# Patient Record
Sex: FEMALE | Race: WHITE | NOT HISPANIC OR LATINO | URBAN - METROPOLITAN AREA
[De-identification: names, ages, dates, MRNs, and addresses within clinical notes are randomized per-mention and may not be internally consistent; named-entity substitution may affect disease eponyms.]

---

## 2017-09-19 ENCOUNTER — OFFICE (OUTPATIENT)
Dept: URBAN - METROPOLITAN AREA CLINIC 75 | Facility: CLINIC | Age: 62
End: 2017-09-19

## 2017-09-19 VITALS
SYSTOLIC BLOOD PRESSURE: 132 MMHG | DIASTOLIC BLOOD PRESSURE: 64 MMHG | HEART RATE: 85 BPM | HEIGHT: 64 IN | WEIGHT: 167 LBS

## 2017-09-19 DIAGNOSIS — R10.10 UPPER ABDOMINAL PAIN, UNSPECIFIED: ICD-10-CM

## 2017-09-19 DIAGNOSIS — K21.9 GASTRO-ESOPHAGEAL REFLUX DISEASE WITHOUT ESOPHAGITIS: ICD-10-CM

## 2017-09-19 DIAGNOSIS — K57.32 DIVERTICULITIS OF LARGE INTESTINE WITHOUT PERFORATION OR ABS: ICD-10-CM

## 2017-09-19 DIAGNOSIS — R93.3 ABNORMAL FINDINGS ON DIAGNOSTIC IMAGING OF OTHER PARTS OF DI: ICD-10-CM

## 2017-09-19 PROCEDURE — 99204 OFFICE O/P NEW MOD 45 MIN: CPT

## 2018-12-30 ENCOUNTER — HOSPITAL ENCOUNTER (EMERGENCY)
Facility: HOSPITAL | Age: 63
Discharge: HOME | End: 2018-12-30
Attending: EMERGENCY MEDICINE | Admitting: EMERGENCY MEDICINE

## 2018-12-30 ENCOUNTER — APPOINTMENT (OUTPATIENT)
Dept: GENERAL RADIOLOGY | Facility: HOSPITAL | Age: 63
End: 2018-12-30

## 2018-12-30 VITALS
DIASTOLIC BLOOD PRESSURE: 72 MMHG | HEIGHT: 64 IN | RESPIRATION RATE: 18 BRPM | TEMPERATURE: 98.7 F | BODY MASS INDEX: 28.68 KG/M2 | HEART RATE: 70 BPM | WEIGHT: 168 LBS | OXYGEN SATURATION: 93 % | SYSTOLIC BLOOD PRESSURE: 155 MMHG

## 2018-12-30 DIAGNOSIS — J06.9 UPPER RESPIRATORY TRACT INFECTION, UNSPECIFIED TYPE: Primary | ICD-10-CM

## 2018-12-30 LAB
ALBUMIN SERPL-MCNC: 4.7 G/DL (ref 3.5–5.2)
ALBUMIN/GLOB SERPL: 1.5 G/DL
ALP SERPL-CCNC: 76 U/L (ref 40–129)
ALT SERPL W P-5'-P-CCNC: 17 U/L (ref 5–33)
ANION GAP SERPL CALCULATED.3IONS-SCNC: 12.6 MMOL/L
AST SERPL-CCNC: 16 U/L (ref 5–32)
BASOPHILS # BLD AUTO: 0.01 10*3/MM3 (ref 0–0.2)
BASOPHILS NFR BLD AUTO: 0.1 % (ref 0–2)
BILIRUB SERPL-MCNC: 0.6 MG/DL (ref 0.2–1.2)
BUN BLD-MCNC: 18 MG/DL (ref 8–23)
BUN/CREAT SERPL: 24.7 (ref 7–25)
CALCIUM SPEC-SCNC: 9.9 MG/DL (ref 8.8–10.5)
CHLORIDE SERPL-SCNC: 100 MMOL/L (ref 98–107)
CO2 SERPL-SCNC: 26.4 MMOL/L (ref 22–29)
CREAT BLD-MCNC: 0.73 MG/DL (ref 0.57–1)
DEPRECATED RDW RBC AUTO: 40.3 FL (ref 37–54)
EOSINOPHIL # BLD AUTO: 0.1 10*3/MM3 (ref 0.1–0.3)
EOSINOPHIL NFR BLD AUTO: 1.1 % (ref 0–4)
ERYTHROCYTE [DISTWIDTH] IN BLOOD BY AUTOMATED COUNT: 12.7 % (ref 11.5–14.5)
FLUAV AG NPH QL: NEGATIVE
FLUBV AG NPH QL IA: NEGATIVE
GFR SERPL CREATININE-BSD FRML MDRD: 81 ML/MIN/1.73
GLOBULIN UR ELPH-MCNC: 3.1 GM/DL
GLUCOSE BLD-MCNC: 94 MG/DL (ref 65–99)
HCT VFR BLD AUTO: 42.3 % (ref 37–47)
HGB BLD-MCNC: 14.2 G/DL (ref 12–16)
IMM GRANULOCYTES # BLD AUTO: 0.02 10*3/MM3 (ref 0–0.03)
IMM GRANULOCYTES NFR BLD AUTO: 0.2 % (ref 0–0.5)
LYMPHOCYTES # BLD AUTO: 1.53 10*3/MM3 (ref 0.6–4.8)
LYMPHOCYTES NFR BLD AUTO: 17.4 % (ref 20–45)
MCH RBC QN AUTO: 29.1 PG (ref 27–31)
MCHC RBC AUTO-ENTMCNC: 33.6 G/DL (ref 31–37)
MCV RBC AUTO: 86.7 FL (ref 81–99)
MONOCYTES # BLD AUTO: 0.46 10*3/MM3 (ref 0–1)
MONOCYTES NFR BLD AUTO: 5.2 % (ref 3–8)
NEUTROPHILS # BLD AUTO: 6.65 10*3/MM3 (ref 1.5–8.3)
NEUTROPHILS NFR BLD AUTO: 76 % (ref 45–70)
NRBC BLD AUTO-RTO: 0 /100 WBC (ref 0–0)
PLAT MORPH BLD: NORMAL
PLATELET # BLD AUTO: 370 10*3/MM3 (ref 140–500)
PMV BLD AUTO: 8.9 FL (ref 7.4–10.4)
POTASSIUM BLD-SCNC: 4.9 MMOL/L (ref 3.5–5.2)
PROT SERPL-MCNC: 7.8 G/DL (ref 6–8.5)
RBC # BLD AUTO: 4.88 10*6/MM3 (ref 4.2–5.4)
RBC MORPH BLD: NORMAL
SODIUM BLD-SCNC: 139 MMOL/L (ref 136–145)
WBC MORPH BLD: NORMAL
WBC NRBC COR # BLD: 8.77 10*3/MM3 (ref 4.8–10.8)

## 2018-12-30 PROCEDURE — 80053 COMPREHEN METABOLIC PANEL: CPT | Performed by: EMERGENCY MEDICINE

## 2018-12-30 PROCEDURE — 99284 EMERGENCY DEPT VISIT MOD MDM: CPT

## 2018-12-30 PROCEDURE — 85025 COMPLETE CBC W/AUTO DIFF WBC: CPT | Performed by: EMERGENCY MEDICINE

## 2018-12-30 PROCEDURE — 85007 BL SMEAR W/DIFF WBC COUNT: CPT | Performed by: EMERGENCY MEDICINE

## 2018-12-30 PROCEDURE — 99282 EMERGENCY DEPT VISIT SF MDM: CPT | Performed by: EMERGENCY MEDICINE

## 2018-12-30 PROCEDURE — 87804 INFLUENZA ASSAY W/OPTIC: CPT | Performed by: EMERGENCY MEDICINE

## 2018-12-30 PROCEDURE — 71046 X-RAY EXAM CHEST 2 VIEWS: CPT

## 2018-12-30 RX ORDER — BENZONATATE 100 MG/1
100 CAPSULE ORAL 3 TIMES DAILY PRN
COMMUNITY
End: 2022-12-02

## 2018-12-30 RX ORDER — DEXTROMETHORPHAN HYDROBROMIDE AND PROMETHAZINE HYDROCHLORIDE 15; 6.25 MG/5ML; MG/5ML
5 SYRUP ORAL 4 TIMES DAILY PRN
Qty: 120 ML | Refills: 0 | Status: SHIPPED | OUTPATIENT
Start: 2018-12-30 | End: 2022-12-02

## 2018-12-30 RX ORDER — METHYLPREDNISOLONE 4 MG/1
TABLET ORAL
Qty: 21 TABLET | Refills: 0 | Status: SHIPPED | OUTPATIENT
Start: 2018-12-30 | End: 2022-12-02

## 2018-12-30 RX ORDER — AMOXICILLIN AND CLAVULANATE POTASSIUM 875; 125 MG/1; MG/1
1 TABLET, FILM COATED ORAL 2 TIMES DAILY
COMMUNITY
End: 2018-12-30

## 2018-12-30 RX ORDER — BENAZEPRIL HYDROCHLORIDE 20 MG/1
20 TABLET ORAL DAILY
COMMUNITY
End: 2022-12-02

## 2022-12-02 ENCOUNTER — OFFICE VISIT (OUTPATIENT)
Dept: FAMILY MEDICINE CLINIC | Facility: CLINIC | Age: 67
End: 2022-12-02

## 2022-12-02 ENCOUNTER — PATIENT ROUNDING (BHMG ONLY) (OUTPATIENT)
Dept: FAMILY MEDICINE CLINIC | Facility: CLINIC | Age: 67
End: 2022-12-02

## 2022-12-02 VITALS
SYSTOLIC BLOOD PRESSURE: 130 MMHG | WEIGHT: 157 LBS | HEART RATE: 92 BPM | TEMPERATURE: 98.4 F | DIASTOLIC BLOOD PRESSURE: 68 MMHG | HEIGHT: 64 IN | OXYGEN SATURATION: 98 % | BODY MASS INDEX: 26.8 KG/M2

## 2022-12-02 DIAGNOSIS — K21.9 GASTROESOPHAGEAL REFLUX DISEASE WITHOUT ESOPHAGITIS: ICD-10-CM

## 2022-12-02 DIAGNOSIS — I10 PRIMARY HYPERTENSION: Primary | ICD-10-CM

## 2022-12-02 DIAGNOSIS — Z87.440 HISTORY OF RECURRENT UTI (URINARY TRACT INFECTION): ICD-10-CM

## 2022-12-02 PROCEDURE — 99204 OFFICE O/P NEW MOD 45 MIN: CPT | Performed by: FAMILY MEDICINE

## 2022-12-02 RX ORDER — TRAVOPROST OPHTHALMIC SOLUTION 0.04 MG/ML
0 SOLUTION OPHTHALMIC DAILY
COMMUNITY
Start: 2022-10-07

## 2022-12-02 RX ORDER — AMLODIPINE BESYLATE 5 MG/1
5 TABLET ORAL DAILY
COMMUNITY
Start: 2022-09-26 | End: 2023-03-28 | Stop reason: SDUPTHER

## 2022-12-02 RX ORDER — BENAZEPRIL HYDROCHLORIDE 40 MG/1
40 TABLET, FILM COATED ORAL DAILY
COMMUNITY
End: 2023-03-28 | Stop reason: SDUPTHER

## 2022-12-02 RX ORDER — HYOSCYAMINE SULFATE 0.125 MG
0.12 TABLET ORAL CONTINUOUS PRN
COMMUNITY

## 2022-12-02 RX ORDER — ESOMEPRAZOLE MAGNESIUM 40 MG/1
40 CAPSULE, DELAYED RELEASE ORAL DAILY
COMMUNITY
Start: 2022-08-02

## 2022-12-02 RX ORDER — TRIMETHOPRIM 100 MG/1
100 TABLET ORAL DAILY
COMMUNITY
Start: 2022-10-10 | End: 2023-01-06 | Stop reason: SDUPTHER

## 2022-12-02 RX ORDER — BENAZEPRIL HYDROCHLORIDE 20 MG/1
40 TABLET ORAL DAILY
COMMUNITY
End: 2022-12-02

## 2022-12-02 NOTE — PROGRESS NOTES
Sent Patient following in [employer] Message:  My name is Sherri Edwards      I am the Practice Manager with   [facility] [address]  [facility] [address] [phone].      I am messaging to officially welcome you to our practice and ask about your recent visit.     Tell me about your visit with us. What things went well?         We're always looking for ways to make our patients' experiences even better. Do you have recommendations on ways we may improve?       Overall were you satisfied with your first visit to our practice?        Is there anything else I can do for you?       Thank you, and have a great day.

## 2022-12-02 NOTE — PROGRESS NOTES
"Chief Complaint  Establish Care (Need a family doctor hers is retiring )    Subjective        Jeanine Guzman presents to [facility]  History of Present Illness  The patient presents today to establish care. She was previously seen by Dr. Boo.  She is establishing care for the following chronic health conditions:    Hypertension.  diagnosed in 2015 or 2016. She is currently taking amlodipine 5 mg daily and benazepril 40 mg daily. She denies any side effects from the medications. Her blood pressure is well controlled today. She does not monitor her blood pressure at home.    Gastroesophageal reflux disease. Began in 2017. She has tried over-the-counter medications in the past, which did not provide relief. She is currently taking Nexium 40 mg once daily, which provides relief. She has changed her diet to avoid fried foods and soda. She is avoiding meat. She is eating a plant-based diet. She had an endoscopy approximately 2 years ago, which was normal.    Chronic recurrent UTIs. She is currently taking trimethoprim for prevention. She was referred to Dr. Morales , but had to cancel the appointment due to other health issues. She started having more frequent UTIs in 2014.     The patient states she do no want to take a pill for her high cholesterol.      Objective   Vital Signs:  /68   Pulse 92   Temp 98.4 °F (36.9 °C)   Ht 162.6 cm (64\")   Wt 71.2 kg (157 lb)   SpO2 98%   BMI 26.95 kg/m²   Estimated body mass index is 26.95 kg/m² as calculated from the following:    Height as of this encounter: 162.6 cm (64\").    Weight as of this encounter: 71.2 kg (157 lb).          Physical Exam  Vitals and nursing note reviewed.   Constitutional:       Appearance: She is well-developed and normal weight.   HENT:      Head: Normocephalic and atraumatic.      Right Ear: External ear normal.      Left Ear: External ear normal.      Nose: Nose normal.   Eyes:      General: No scleral " icterus.     Conjunctiva/sclera: Conjunctivae normal.   Cardiovascular:      Rate and Rhythm: Normal rate and regular rhythm.      Heart sounds: Normal heart sounds.   Pulmonary:      Effort: Pulmonary effort is normal.      Breath sounds: Normal breath sounds.   Musculoskeletal:      Cervical back: Normal range of motion and neck supple.      Right lower leg: No edema.      Left lower leg: No edema.   Lymphadenopathy:      Cervical: No cervical adenopathy.   Skin:     General: Skin is warm and dry.      Findings: No rash.   Neurological:      Mental Status: She is alert and oriented to person, place, and time.   Psychiatric:         Mood and Affect: Mood normal.         Behavior: Behavior normal.         Thought Content: Thought content normal.         Judgment: Judgment normal.        Result Review :  The following data was reviewed by: Kori Patel DO on 12/02/2022:    LIPID PANEL (02/24/2021 15:19)  HEMOGLOBIN A1C (02/24/2021 04:38)  CBC AND DIFFERENTIAL (02/23/2021 11:54)  COMPREHENSIVE METABOLIC PANEL (02/23/2021 11:54)                  Assessment and Plan   Diagnoses and all orders for this visit:    1. Primary hypertension (Primary)    2. Gastroesophageal reflux disease without esophagitis    3. History of recurrent UTI (urinary tract infection)      1. Hypertension.  - Her blood pressure is well controlled today.  - She will continue taking amlodipine 5 mg daily and benazepril 40 mg daily. A refill was sent to her preferred pharmacy today.    2. Gastroesophageal reflux disease.  - She will continue taking Nexium 40mg once daily.  A refill was provided.    3. Chronic recurrent UTIs.  - She will continue taking trimethoprim as prescribed.  - She will follow up with Dr. Morales as scheduled.    4. Hyperlipidemia.  - She will continue to work on her diet and exercise.  - We will obtain labs at her follow-up visit in March.  We can determine need for medication at that time.    The patient will follow up in  03/2023 for a Medicare wellness visit.         Follow Up   Return in about 3 months (around 3/2/2023) for Medicare Wellness, HTN, Fasting.  Patient was given instructions and counseling regarding her condition or for health maintenance advice. Please see specific information pulled into the AVS if appropriate.     Transcribed from ambient dictation for Kori Patel DO by Mariana Arechiga.  12/02/22   11:42 EST    Patient or patient representative verbalized consent to the visit recording.

## 2023-01-04 RX ORDER — TRIMETHOPRIM 100 MG/1
100 TABLET ORAL DAILY
OUTPATIENT
Start: 2023-01-04

## 2023-01-04 NOTE — TELEPHONE ENCOUNTER
Caller: Jeanine Guzman    Relationship: Self    Best call back number: [phone]    Requested Prescriptions:   Requested Prescriptions     Pending Prescriptions Disp Refills   • trimethoprim (TRIMPEX) 100 MG tablet       Sig: Take 1 tablet by mouth Daily.        Pharmacy where request should be sent: [facility] - [place], [place] - [address] [facility] [phone] [facility] [phone] FX     Additional details provided by patient: PATIENT STATES THAT SHE HAS A 4 DAY SUPPLY.    Does the patient have less than a 3 day supply:  [] Yes  [x] No    Would you like a call back once the refill request has been completed: [] Yes [x] No    If the office needs to give you a call back, can they leave a voicemail: [] Yes [x] No    Alexx Garcia Rep   01/04/23 15:44 EST

## 2023-01-04 NOTE — TELEPHONE ENCOUNTER
This medication is listed as an allergy on her chart.  I have never filled this prescription in the past.  Please contact the patient and ask about her Bactrim allergy because this medication is in Bactrim.

## 2023-01-06 RX ORDER — TRIMETHOPRIM 100 MG/1
100 TABLET ORAL DAILY
Qty: 30 TABLET | Refills: 3 | Status: SHIPPED | OUTPATIENT
Start: 2023-01-06

## 2023-01-06 NOTE — TELEPHONE ENCOUNTER
Ok.  Since she is only allergic to sulfa and has tolerated the trimethoprim for years, I will send in a refill as requested.

## 2023-03-28 RX ORDER — AMLODIPINE BESYLATE 5 MG/1
5 TABLET ORAL DAILY
Qty: 90 TABLET | Refills: 0 | Status: SHIPPED | OUTPATIENT
Start: 2023-03-28

## 2023-03-28 RX ORDER — BENAZEPRIL HYDROCHLORIDE 40 MG/1
40 TABLET, FILM COATED ORAL DAILY
Qty: 90 TABLET | Refills: 0 | Status: SHIPPED | OUTPATIENT
Start: 2023-03-28

## 2023-05-24 ENCOUNTER — OFFICE VISIT (OUTPATIENT)
Dept: FAMILY MEDICINE CLINIC | Facility: CLINIC | Age: 68
End: 2023-05-24
Payer: MEDICARE

## 2023-05-24 VITALS
SYSTOLIC BLOOD PRESSURE: 140 MMHG | HEART RATE: 92 BPM | HEIGHT: 65 IN | DIASTOLIC BLOOD PRESSURE: 77 MMHG | WEIGHT: 165 LBS | BODY MASS INDEX: 27.49 KG/M2 | OXYGEN SATURATION: 97 % | TEMPERATURE: 97 F

## 2023-05-24 DIAGNOSIS — I10 PRIMARY HYPERTENSION: ICD-10-CM

## 2023-05-24 DIAGNOSIS — Z79.899 ENCOUNTER FOR LONG-TERM (CURRENT) USE OF MEDICATIONS: ICD-10-CM

## 2023-05-24 DIAGNOSIS — Z11.59 NEED FOR HEPATITIS C SCREENING TEST: ICD-10-CM

## 2023-05-24 DIAGNOSIS — K21.9 GASTROESOPHAGEAL REFLUX DISEASE WITHOUT ESOPHAGITIS: ICD-10-CM

## 2023-05-24 DIAGNOSIS — Z00.00 MEDICARE ANNUAL WELLNESS VISIT, SUBSEQUENT: Primary | ICD-10-CM

## 2023-05-24 DIAGNOSIS — Z12.31 ENCOUNTER FOR SCREENING MAMMOGRAM FOR MALIGNANT NEOPLASM OF BREAST: ICD-10-CM

## 2023-05-24 RX ORDER — AMLODIPINE BESYLATE 5 MG/1
5 TABLET ORAL DAILY
Qty: 90 TABLET | Refills: 0 | Status: SHIPPED | OUTPATIENT
Start: 2023-05-24

## 2023-05-24 RX ORDER — BENAZEPRIL HYDROCHLORIDE 40 MG/1
40 TABLET, FILM COATED ORAL DAILY
Qty: 90 TABLET | Refills: 1 | Status: SHIPPED | OUTPATIENT
Start: 2023-05-24

## 2023-05-24 NOTE — Clinical Note
Patient had a colonoscopy by Dr. Travis Glass in [place], [place] in November 2022.  Can you please request those records?

## 2023-05-24 NOTE — PROGRESS NOTES
The ABCs of the Annual Wellness Visit  Subsequent Medicare Wellness Visit    Subjective    Jeanine Guzman is a 67 y.o. female who presents for a Subsequent Medicare Wellness Visit.    The following portions of the patient's history were reviewed and   updated as appropriate: allergies, current medications, past family history, past medical history, past social history, past surgical history and problem list.    Compared to one year ago, the patient feels her physical   health is the same.    Compared to one year ago, the patient feels her mental   health is the same.    Recent Hospitalizations:  She was not admitted to the hospital during the last year.       Current Medical Providers:  Patient Care Team:  Kori Patel DO as PCP - General (Family Medicine)    Outpatient Medications Prior to Visit   Medication Sig Dispense Refill   • esomeprazole (nexIUM) 40 MG capsule Take 1 capsule by mouth Daily.     • hyoscyamine (ANASPAZ,LEVSIN) 0.125 MG tablet Take 1 tablet by mouth Continuous As Needed.     • travoprost, BAK free, (TRAVATAN) 0.004 % solution ophthalmic solution Administer 0.004 drops to both eyes Daily.     • trimethoprim (TRIMPEX) 100 MG tablet Take 1 tablet by mouth Daily. 30 tablet 3   • amLODIPine (NORVASC) 5 MG tablet Take 1 tablet by mouth Daily. 90 tablet 0   • benazepril (LOTENSIN) 40 MG tablet Take 1 tablet by mouth Daily. 90 tablet 0     No facility-administered medications prior to visit.       No opioid medication identified on active medication list. I have reviewed chart for other potential  high risk medication/s and harmful drug interactions in the elderly.          Aspirin is not on active medication list.  Aspirin use is not indicated based on review of current medical condition/s. Risk of harm outweighs potential benefits.  .    There is no problem list on file for this patient.    Advance Care Planning   Advance Care Planning     Advance Directive is not on file.  ACP discussion was  "held with the patient during this visit. Patient has an advance directive (not in EMR), copy requested.     Objective    Vitals:    23 1049   BP: 140/77   Pulse: 92   Temp: 97 °F (36.1 °C)   SpO2: 97%   Weight: 74.8 kg (165 lb)   Height: 165.1 cm (65\")     Estimated body mass index is 27.46 kg/m² as calculated from the following:    Height as of this encounter: 165.1 cm (65\").    Weight as of this encounter: 74.8 kg (165 lb).    BMI is >= 25 and <30. (Overweight) The following options were offered after discussion;: exercise counseling/recommendations and nutrition counseling/recommendations      Does the patient have evidence of cognitive impairment? No          HEALTH RISK ASSESSMENT    Smoking Status:  Social History     Tobacco Use   Smoking Status Never   • Passive exposure: Never   Smokeless Tobacco Never     Alcohol Consumption:  Social History     Substance and Sexual Activity   Alcohol Use No     Fall Risk Screen:    LUADI Fall Risk Assessment was completed, and patient is at LOW risk for falls.Assessment completed on:2023    Depression Screenin/24/2023    10:47 AM   PHQ-2/PHQ-9 Depression Screening   Little Interest or Pleasure in Doing Things 0-->not at all   Feeling Down, Depressed or Hopeless 0-->not at all   PHQ-9: Brief Depression Severity Measure Score 0       Health Habits and Functional and Cognitive Screenin/24/2023    10:48 AM   Functional & Cognitive Status   Do you have difficulty preparing food and eating? No   Do you have difficulty bathing yourself, getting dressed or grooming yourself? No   Do you have difficulty using the toilet? No   Do you have difficulty moving around from place to place? No   Do you have trouble with steps or getting out of a bed or a chair? No   Current Diet Limited Junk Food   Dental Exam Up to date   Eye Exam Up to date   Exercise (times per week) 0 times per week   Current Exercises Include No Regular Exercise   Do you need help using " the phone?  No   Are you deaf or do you have serious difficulty hearing?  No   Do you need help with transportation? No   Do you need help shopping? No   Do you need help preparing meals?  No   Do you need help with housework?  No   Do you need help with laundry? No   Do you need help taking your medications? No   Do you need help managing money? No   Do you ever drive or ride in a car without wearing a seat belt? No       Age-appropriate Screening Schedule:  Refer to the list below for future screening recommendations based on patient's age, sex and/or medical conditions. Orders for these recommended tests are listed in the plan section. The patient has been provided with a written plan.    Health Maintenance   Topic Date Due   • MAMMOGRAM  Never done   • COLORECTAL CANCER SCREENING  Never done   • TDAP/TD VACCINES (1 - Tdap) Never done   • ZOSTER VACCINE (1 of 2) Never done   • Pneumococcal Vaccine 65+ (1 - PCV) Never done   • COVID-19 Vaccine (2 - Booster for Joaquina series) 05/10/2021   • HEPATITIS C SCREENING  Never done   • ANNUAL WELLNESS VISIT  Never done   • DXA SCAN  05/01/2024 (Originally 1955)   • INFLUENZA VACCINE  08/01/2023   • LIPID PANEL  09/30/2023                  CMS Preventative Services Quick Reference  Risk Factors Identified During Encounter  Immunizations Discussed/Encouraged: Influenza, Prevnar 20 (Pneumococcal 20-valent conjugate), Shingrix and COVID19  Dental Screening Recommended  Vision Screening Recommended  The above risks/problems have been discussed with the patient.  Pertinent information has been shared with the patient in the After Visit Summary.  An After Visit Summary and PPPS were made available to the patient.    Follow Up:   Next Medicare Wellness visit to be scheduled in 1 year.       Additional E&M Note during same encounter follows:  Patient has multiple medical problems which are significant and separately identifiable that require additional work above and beyond  "the Medicare Wellness Visit.      Chief Complaint  Medicare Wellness-subsequent    Subjective        HPI  Jeanine Guzman is also being seen today for the following chronic health conditions:    Hypertension.  diagnosed in 2015 or 2016. She is currently taking amlodipine 5 mg daily and benazepril 40 mg daily. She denies any side effects from the medications. Her blood pressure is well controlled today. She does not monitor her blood pressure at home.    Gastroesophageal reflux disease. Began in 2017. She has tried over-the-counter medications in the past, which did not provide relief. She is currently taking Nexium 40 mg once daily, which provides relief. She has changed her diet to avoid fried foods and soda. She is avoiding meat. She is eating a plant-based diet. She had an endoscopy approximately 2020, which was normal.    Chronic recurrent UTIs. She started having more frequent UTIs in 2014.  She is currently taking trimethoprim for prevention. She saw Dr. Morales but patient did not follow thru with additional testing that Dr Morales wanted.        Hyperlipidemia.  The patient states she does not want to take a pill for her high cholesterol.       Objective   Vital Signs:  /77   Pulse 92   Temp 97 °F (36.1 °C)   Ht 165.1 cm (65\")   Wt 74.8 kg (165 lb)   SpO2 97%   BMI 27.46 kg/m²     Physical Exam  Vitals and nursing note reviewed.   Constitutional:       Appearance: Normal appearance. She is well-developed.   HENT:      Head: Normocephalic and atraumatic.   Eyes:      General: No scleral icterus.     Conjunctiva/sclera: Conjunctivae normal.   Cardiovascular:      Rate and Rhythm: Normal rate and regular rhythm.      Heart sounds: Normal heart sounds.   Pulmonary:      Effort: Pulmonary effort is normal.      Breath sounds: Normal breath sounds.   Musculoskeletal:      Cervical back: Normal range of motion and neck supple.      Right lower leg: No edema.      Left lower leg: No edema.   Lymphadenopathy: "      Cervical: No cervical adenopathy.   Skin:     General: Skin is warm and dry.      Findings: No rash.   Neurological:      Mental Status: She is alert and oriented to person, place, and time.   Psychiatric:         Mood and Affect: Mood normal.         Behavior: Behavior normal.         Thought Content: Thought content normal.         Judgment: Judgment normal.          The following data was reviewed by: Kori Patel DO on 05/24/2023:                     Assessment and Plan   Diagnoses and all orders for this visit:    1. Medicare annual wellness visit, subsequent (Primary)    2. Primary hypertension  -     Comprehensive Metabolic Panel  -     amLODIPine (NORVASC) 5 MG tablet; Take 1 tablet by mouth Daily.  Dispense: 90 tablet; Refill: 0  -     benazepril (LOTENSIN) 40 MG tablet; Take 1 tablet by mouth Daily.  Dispense: 90 tablet; Refill: 1    3. Gastroesophageal reflux disease without esophagitis    4. Encounter for long-term (current) use of medications  -     TSH  -     CBC & Differential  -     Comprehensive Metabolic Panel  -     Lipid Panel    5. Encounter for screening mammogram for malignant neoplasm of breast  -     Mammo Screening Digital Tomosynthesis Bilateral With CAD; Future    6. Need for hepatitis C screening test  -     Hepatitis C Antibody    RHM.  Hep C screening today.  Lipids.  Patient refuses COVID vaccine.      Patient is also here to follow-up on chronic stable hypertension and GERD.  I advised her that it is a good idea to try to taper off of Nexium she was advised to go down to the over-the-counter Nexium 20 mg daily for 1 month.  Then changed to Pepcid 20 mg daily for 1 month and then try to discontinue it.  If her reflux returns at any time she should increase therapy for follow-up.  Surveillance labs were obtained today and any medication changes will be made based on lab results and will be called to the patient later this week.       Follow Up   Return in about 6 months  (around 11/24/2023) for HTN.  Patient was given instructions and counseling regarding her condition or for health maintenance advice. Please see specific information pulled into the AVS if appropriate.

## 2023-05-25 LAB
ALBUMIN SERPL-MCNC: 4.9 G/DL (ref 3.8–4.8)
ALBUMIN/GLOB SERPL: 2 {RATIO} (ref 1.2–2.2)
ALP SERPL-CCNC: 110 IU/L (ref 44–121)
ALT SERPL-CCNC: 16 IU/L (ref 0–32)
AST SERPL-CCNC: 14 IU/L (ref 0–40)
BASOPHILS # BLD AUTO: 0 X10E3/UL (ref 0–0.2)
BASOPHILS NFR BLD AUTO: 1 %
BILIRUB SERPL-MCNC: 0.4 MG/DL (ref 0–1.2)
BUN SERPL-MCNC: 23 MG/DL (ref 8–27)
BUN/CREAT SERPL: 33 (ref 12–28)
CALCIUM SERPL-MCNC: 10 MG/DL (ref 8.7–10.3)
CHLORIDE SERPL-SCNC: 101 MMOL/L (ref 96–106)
CHOLEST SERPL-MCNC: 281 MG/DL (ref 100–199)
CO2 SERPL-SCNC: 23 MMOL/L (ref 20–29)
CREAT SERPL-MCNC: 0.7 MG/DL (ref 0.57–1)
EGFRCR SERPLBLD CKD-EPI 2021: 95 ML/MIN/1.73
EOSINOPHIL # BLD AUTO: 0.3 X10E3/UL (ref 0–0.4)
EOSINOPHIL NFR BLD AUTO: 5 %
ERYTHROCYTE [DISTWIDTH] IN BLOOD BY AUTOMATED COUNT: 12.7 % (ref 11.7–15.4)
GLOBULIN SER CALC-MCNC: 2.5 G/DL (ref 1.5–4.5)
GLUCOSE SERPL-MCNC: 90 MG/DL (ref 70–99)
HCT VFR BLD AUTO: 41.7 % (ref 34–46.6)
HCV IGG SERPL QL IA: NON REACTIVE
HDLC SERPL-MCNC: 54 MG/DL
HGB BLD-MCNC: 14.2 G/DL (ref 11.1–15.9)
IMM GRANULOCYTES # BLD AUTO: 0 X10E3/UL (ref 0–0.1)
IMM GRANULOCYTES NFR BLD AUTO: 0 %
LDLC SERPL CALC-MCNC: 178 MG/DL (ref 0–99)
LYMPHOCYTES # BLD AUTO: 1.3 X10E3/UL (ref 0.7–3.1)
LYMPHOCYTES NFR BLD AUTO: 21 %
MCH RBC QN AUTO: 28.2 PG (ref 26.6–33)
MCHC RBC AUTO-ENTMCNC: 34.1 G/DL (ref 31.5–35.7)
MCV RBC AUTO: 83 FL (ref 79–97)
MONOCYTES # BLD AUTO: 0.4 X10E3/UL (ref 0.1–0.9)
MONOCYTES NFR BLD AUTO: 6 %
NEUTROPHILS # BLD AUTO: 4.2 X10E3/UL (ref 1.4–7)
NEUTROPHILS NFR BLD AUTO: 67 %
PLATELET # BLD AUTO: 414 X10E3/UL (ref 150–450)
POTASSIUM SERPL-SCNC: 4 MMOL/L (ref 3.5–5.2)
PROT SERPL-MCNC: 7.4 G/DL (ref 6–8.5)
RBC # BLD AUTO: 5.03 X10E6/UL (ref 3.77–5.28)
SODIUM SERPL-SCNC: 141 MMOL/L (ref 134–144)
TRIGL SERPL-MCNC: 257 MG/DL (ref 0–149)
TSH SERPL DL<=0.005 MIU/L-ACNC: 1.33 UIU/ML (ref 0.45–4.5)
VLDLC SERPL CALC-MCNC: 49 MG/DL (ref 5–40)
WBC # BLD AUTO: 6.3 X10E3/UL (ref 3.4–10.8)

## 2023-07-17 ENCOUNTER — TELEPHONE (OUTPATIENT)
Dept: FAMILY MEDICINE CLINIC | Facility: CLINIC | Age: 68
End: 2023-07-17

## 2023-07-17 NOTE — TELEPHONE ENCOUNTER
Caller: GuzmanAlleyJeanine    Relationship: Self    Best call back number: [phone]     What is the best time to reach you: ANYTIME     Who are you requesting to speak with (clinical staff, provider,  specific staff member): CLINICAL     Do you know the name of the person who called: JEANINE     What was the call regarding: JEANINE IS WANTING TO KNOW WHY SHE IS NEEDING A DIAGNOSTIC MAMMOGRAM?    SHE IS WANTING TO KNOW IF THE ORDER IS STILL ACTIVE AT [facility].     Is it okay if the provider responds through MyChart: NO

## 2023-07-31 DIAGNOSIS — N64.4 BREAST PAIN IN FEMALE: ICD-10-CM

## 2023-07-31 DIAGNOSIS — Z12.31 ENCOUNTER FOR SCREENING MAMMOGRAM FOR MALIGNANT NEOPLASM OF BREAST: ICD-10-CM

## 2023-10-03 DIAGNOSIS — I10 PRIMARY HYPERTENSION: ICD-10-CM

## 2023-10-03 RX ORDER — AMLODIPINE BESYLATE 5 MG/1
5 TABLET ORAL DAILY
Qty: 90 TABLET | Refills: 0 | Status: SHIPPED | OUTPATIENT
Start: 2023-10-03

## 2023-11-30 ENCOUNTER — OFFICE VISIT (OUTPATIENT)
Dept: FAMILY MEDICINE CLINIC | Facility: CLINIC | Age: 68
End: 2023-11-30
Payer: MEDICARE

## 2023-11-30 VITALS
HEART RATE: 78 BPM | WEIGHT: 157.2 LBS | OXYGEN SATURATION: 96 % | HEIGHT: 65 IN | DIASTOLIC BLOOD PRESSURE: 78 MMHG | SYSTOLIC BLOOD PRESSURE: 147 MMHG | BODY MASS INDEX: 26.19 KG/M2

## 2023-11-30 DIAGNOSIS — K21.9 GASTROESOPHAGEAL REFLUX DISEASE WITHOUT ESOPHAGITIS: ICD-10-CM

## 2023-11-30 DIAGNOSIS — E78.2 MIXED HYPERLIPIDEMIA: ICD-10-CM

## 2023-11-30 DIAGNOSIS — Z87.440 HISTORY OF RECURRENT UTI (URINARY TRACT INFECTION): ICD-10-CM

## 2023-11-30 DIAGNOSIS — Z79.899 ENCOUNTER FOR LONG-TERM (CURRENT) USE OF MEDICATIONS: ICD-10-CM

## 2023-11-30 DIAGNOSIS — Z53.20 REFUSAL OF STATIN MEDICATION BY PATIENT: ICD-10-CM

## 2023-11-30 DIAGNOSIS — I10 PRIMARY HYPERTENSION: Primary | ICD-10-CM

## 2023-11-30 LAB
ALBUMIN SERPL-MCNC: 4.9 G/DL (ref 3.5–5.2)
ALBUMIN/GLOB SERPL: 2.5 G/DL
ALP SERPL-CCNC: 100 U/L (ref 39–117)
ALT SERPL-CCNC: 14 U/L (ref 1–33)
AST SERPL-CCNC: 12 U/L (ref 1–32)
BILIRUB SERPL-MCNC: 0.5 MG/DL (ref 0–1.2)
BUN SERPL-MCNC: 22 MG/DL (ref 8–23)
BUN/CREAT SERPL: 30.6 (ref 7–25)
CALCIUM SERPL-MCNC: 10.5 MG/DL (ref 8.6–10.5)
CHLORIDE SERPL-SCNC: 106 MMOL/L (ref 98–107)
CO2 SERPL-SCNC: 27.1 MMOL/L (ref 22–29)
CREAT SERPL-MCNC: 0.72 MG/DL (ref 0.57–1)
EGFRCR SERPLBLD CKD-EPI 2021: 91.2 ML/MIN/1.73
GLOBULIN SER CALC-MCNC: 2 GM/DL
GLUCOSE SERPL-MCNC: 93 MG/DL (ref 65–99)
POTASSIUM SERPL-SCNC: 4.6 MMOL/L (ref 3.5–5.2)
PROT SERPL-MCNC: 6.9 G/DL (ref 6–8.5)
SODIUM SERPL-SCNC: 141 MMOL/L (ref 136–145)

## 2023-11-30 PROCEDURE — 99214 OFFICE O/P EST MOD 30 MIN: CPT | Performed by: FAMILY MEDICINE

## 2023-11-30 PROCEDURE — 1160F RVW MEDS BY RX/DR IN RCRD: CPT | Performed by: FAMILY MEDICINE

## 2023-11-30 PROCEDURE — 1159F MED LIST DOCD IN RCRD: CPT | Performed by: FAMILY MEDICINE

## 2023-11-30 RX ORDER — BENAZEPRIL HYDROCHLORIDE 40 MG/1
40 TABLET, FILM COATED ORAL DAILY
Qty: 90 TABLET | Refills: 1 | Status: SHIPPED | OUTPATIENT
Start: 2023-11-30

## 2023-11-30 RX ORDER — AMLODIPINE BESYLATE 5 MG/1
5 TABLET ORAL DAILY
Qty: 90 TABLET | Refills: 1 | Status: SHIPPED | OUTPATIENT
Start: 2023-11-30

## 2023-11-30 RX ORDER — ESOMEPRAZOLE MAGNESIUM 40 MG/1
40 CAPSULE, DELAYED RELEASE ORAL
Qty: 90 CAPSULE | Refills: 2 | Status: SHIPPED | OUTPATIENT
Start: 2023-11-30

## 2023-11-30 NOTE — PROGRESS NOTES
"Chief Complaint  Hypertension    Subjective        Jeanine Guzman presents to [facility]  History of Present Illness  Patient is here today for the following chronic health conditions:    Hypertension.  diagnosed in 2015 or 2016. She is currently taking amlodipine 5 mg daily and benazepril 40 mg daily. She denies any side effects from the medications. Her blood pressure is well controlled today. She does not monitor her blood pressure at home.    Gastroesophageal reflux disease. Began in 2017. She has tried over-the-counter medications in the past, which did not provide relief. She is currently taking Nexium 40 mg once daily, which provides relief. She has changed her diet to avoid fried foods and soda. She is avoiding meat. She is eating a plant-based diet. She had an endoscopy approximately 2020, which was normal.    Chronic recurrent UTIs. She started having more frequent UTIs in 2014.  She is currently taking trimethoprim for prevention. She saw Dr. Morales but patient did not follow thru with additional testing that Dr Morales wanted.        Hyperlipidemia.  The patient states she does not want to take a pill for her high cholesterol.      Objective   Vital Signs:  /78   Pulse 78   Ht 165.1 cm (65\")   Wt 71.3 kg (157 lb 3.2 oz)   SpO2 96%   BMI 26.16 kg/m²   Estimated body mass index is 26.16 kg/m² as calculated from the following:    Height as of this encounter: 165.1 cm (65\").    Weight as of this encounter: 71.3 kg (157 lb 3.2 oz).       Physical Exam  Vitals and nursing note reviewed.   Constitutional:       Appearance: Normal appearance. She is well-developed.   HENT:      Head: Normocephalic and atraumatic.      Right Ear: External ear normal.      Left Ear: External ear normal.      Nose: Nose normal.   Eyes:      General: No scleral icterus.     Conjunctiva/sclera: Conjunctivae normal.   Cardiovascular:      Rate and Rhythm: Normal rate and regular rhythm.      Heart " sounds: Normal heart sounds.   Pulmonary:      Effort: Pulmonary effort is normal.      Breath sounds: Normal breath sounds.   Musculoskeletal:      Cervical back: Normal range of motion and neck supple.      Right lower leg: No edema.      Left lower leg: No edema.   Skin:     General: Skin is warm and dry.      Findings: No rash.   Neurological:      Mental Status: She is alert and oriented to person, place, and time.   Psychiatric:         Mood and Affect: Mood normal.         Behavior: Behavior normal.         Thought Content: Thought content normal.         Judgment: Judgment normal.        Result Review :  The following data was reviewed by: Kori Patel DO on 11/30/2023:  Common labs          5/24/2023    00:00   Common Labs   Glucose 90    BUN 23    Creatinine 0.70    Sodium 141    Potassium 4.0    Chloride 101    Calcium 10.0    Total Protein 7.4    Albumin 4.9    Total Bilirubin 0.4    Alkaline Phosphatase 110    AST (SGOT) 14    ALT (SGPT) 16    WBC 6.3    Hemoglobin 14.2    Hematocrit 41.7    Platelets 414    Total Cholesterol 281    Triglycerides 257    HDL Cholesterol 54    LDL Cholesterol  178      TSH          5/24/2023    00:00   TSH   TSH 1.330                   Assessment and Plan   Diagnoses and all orders for this visit:    1. Primary hypertension (Primary)  -     Comprehensive Metabolic Panel  -     benazepril (LOTENSIN) 40 MG tablet; Take 1 tablet by mouth Daily.  Dispense: 90 tablet; Refill: 1  -     amLODIPine (NORVASC) 5 MG tablet; Take 1 tablet by mouth Daily.  Dispense: 90 tablet; Refill: 1    2. Gastroesophageal reflux disease without esophagitis  -     esomeprazole (nexIUM) 40 MG capsule; Take 1 capsule by mouth Every Morning Before Breakfast.  Dispense: 90 capsule; Refill: 2    3. History of recurrent UTI (urinary tract infection)    4. Mixed hyperlipidemia    5. Refusal of statin medication by patient    6. Encounter for long-term (current) use of medications  -     Comprehensive  Metabolic Panel      Patient is here today to follow-up on chronic stable hypertension, GERD, recurrent UTIs, and hyperlipidemia.  Patient still refusing statins.  Surveillance labs were obtained today and any medication changes will be made based on lab results and will be called to the patient later this week.          Follow Up   Return in about 6 months (around 5/30/2024) for Medicare Wellness, HTN.  Patient was given instructions and counseling regarding her condition or for health maintenance advice. Please see specific information pulled into the AVS if appropriate.         Answers submitted by the patient for this visit:  Other (Submitted on 11/23/2023)  Please describe your symptoms.: Nothing  Have you had these symptoms before?: No  How long have you been having these symptoms?: 1-4 days  Primary Reason for Visit (Submitted on 11/23/2023)  What is the primary reason for your visit?: Other

## 2024-01-09 RX ORDER — TRIMETHOPRIM 100 MG/1
100 TABLET ORAL DAILY
Qty: 30 TABLET | Refills: 6 | Status: SHIPPED | OUTPATIENT
Start: 2024-01-09

## 2024-03-30 DIAGNOSIS — K21.9 GASTROESOPHAGEAL REFLUX DISEASE WITHOUT ESOPHAGITIS: ICD-10-CM

## 2024-04-01 RX ORDER — ESOMEPRAZOLE MAGNESIUM 40 MG/1
40 CAPSULE, DELAYED RELEASE ORAL
Qty: 90 CAPSULE | Refills: 0 | Status: SHIPPED | OUTPATIENT
Start: 2024-04-01

## 2024-05-19 NOTE — PROGRESS NOTES
Left message for Dr. Glass and Cecelia at [phone] requesting information to be faxed.   Patient

## 2024-07-01 DIAGNOSIS — K21.9 GASTROESOPHAGEAL REFLUX DISEASE WITHOUT ESOPHAGITIS: ICD-10-CM

## 2024-07-01 RX ORDER — ESOMEPRAZOLE MAGNESIUM 40 MG/1
40 CAPSULE, DELAYED RELEASE ORAL
Qty: 90 CAPSULE | Refills: 0 | Status: SHIPPED | OUTPATIENT
Start: 2024-07-01

## 2024-07-03 DIAGNOSIS — I10 PRIMARY HYPERTENSION: ICD-10-CM

## 2024-07-09 DIAGNOSIS — I10 PRIMARY HYPERTENSION: ICD-10-CM

## 2024-07-09 NOTE — TELEPHONE ENCOUNTER
Caller: Jeanine Guzman    Relationship: Self    Best call back number: [phone]     Requested Prescriptions:   Requested Prescriptions     Pending Prescriptions Disp Refills    amLODIPine (NORVASC) 5 MG tablet 90 tablet 1     Sig: Take 1 tablet by mouth Daily.    hyoscyamine (ANASPAZ,LEVSIN) 0.125 MG tablet       Sig: Take 1 tablet by mouth Continuous As Needed.    benazepril (LOTENSIN) 40 MG tablet 90 tablet 1     Sig: Take 1 tablet by mouth Daily.        Pharmacy where request should be sent: [facility] [place] - [address] [facility] [phone] [facility] [phone]      Last office visit with prescribing clinician: 11/30/2023   Last telemedicine visit with prescribing clinician: Visit date not found   Next office visit with prescribing clinician: 8/13/2024     Additional details provided by patient: WILL NEED NEW PRESCRIPTION AND BLOOD PRESSURE BENAZEPRIL AND AMLODIPINE NEED 90 DAY SUPPLY     Does the patient have less than a 3 day supply:  [x] Yes  [] No    Would you like a call back once the refill request has been completed: [] Yes [] No    If the office needs to give you a call back, can they leave a voicemail: [] Yes [] No    Alexx Musa   07/09/24 08:44 EDT

## 2024-07-10 RX ORDER — BENAZEPRIL HYDROCHLORIDE 40 MG/1
40 TABLET ORAL DAILY
Qty: 90 TABLET | Refills: 1 | Status: SHIPPED | OUTPATIENT
Start: 2024-07-10

## 2024-07-10 RX ORDER — AMLODIPINE BESYLATE 5 MG/1
5 TABLET ORAL DAILY
Qty: 90 TABLET | Refills: 1 | OUTPATIENT
Start: 2024-07-10

## 2024-07-10 RX ORDER — AMLODIPINE BESYLATE 5 MG/1
5 TABLET ORAL DAILY
Qty: 90 TABLET | Refills: 1 | Status: SHIPPED | OUTPATIENT
Start: 2024-07-10

## 2024-07-10 RX ORDER — HYOSCYAMINE SULFATE 0.125 MG
0.12 TABLET ORAL EVERY 6 HOURS PRN
Qty: 90 TABLET | Refills: 0 | Status: SHIPPED | OUTPATIENT
Start: 2024-07-10

## 2024-07-10 RX ORDER — BENAZEPRIL HYDROCHLORIDE 40 MG/1
40 TABLET ORAL DAILY
Qty: 90 TABLET | Refills: 1 | OUTPATIENT
Start: 2024-07-10

## 2024-07-10 RX ORDER — HYOSCYAMINE SULFATE 0.125 MG
0.12 TABLET ORAL CONTINUOUS PRN
OUTPATIENT
Start: 2024-07-10

## 2024-08-13 ENCOUNTER — OFFICE VISIT (OUTPATIENT)
Dept: FAMILY MEDICINE CLINIC | Facility: CLINIC | Age: 69
End: 2024-08-13
Payer: MEDICARE

## 2024-08-13 VITALS
OXYGEN SATURATION: 97 % | SYSTOLIC BLOOD PRESSURE: 122 MMHG | HEART RATE: 60 BPM | BODY MASS INDEX: 26.19 KG/M2 | WEIGHT: 157.2 LBS | HEIGHT: 65 IN | DIASTOLIC BLOOD PRESSURE: 68 MMHG

## 2024-08-13 DIAGNOSIS — E78.2 MIXED HYPERLIPIDEMIA: ICD-10-CM

## 2024-08-13 DIAGNOSIS — Z53.20 REFUSAL OF STATIN MEDICATION BY PATIENT: ICD-10-CM

## 2024-08-13 DIAGNOSIS — I10 PRIMARY HYPERTENSION: ICD-10-CM

## 2024-08-13 DIAGNOSIS — K21.9 GASTROESOPHAGEAL REFLUX DISEASE WITHOUT ESOPHAGITIS: ICD-10-CM

## 2024-08-13 DIAGNOSIS — Z00.00 MEDICARE ANNUAL WELLNESS VISIT, SUBSEQUENT: Primary | ICD-10-CM

## 2024-08-13 DIAGNOSIS — Z87.440 HISTORY OF RECURRENT UTI (URINARY TRACT INFECTION): ICD-10-CM

## 2024-08-13 DIAGNOSIS — Z79.899 ENCOUNTER FOR LONG-TERM (CURRENT) USE OF MEDICATIONS: ICD-10-CM

## 2024-08-13 RX ORDER — BENAZEPRIL HYDROCHLORIDE 40 MG/1
40 TABLET ORAL DAILY
Qty: 90 TABLET | Refills: 1 | Status: SHIPPED | OUTPATIENT
Start: 2024-08-13

## 2024-08-13 RX ORDER — ESOMEPRAZOLE MAGNESIUM 40 MG/1
40 CAPSULE, DELAYED RELEASE ORAL
Qty: 90 CAPSULE | Refills: 1 | Status: SHIPPED | OUTPATIENT
Start: 2024-08-13

## 2024-08-13 RX ORDER — AMLODIPINE BESYLATE 5 MG/1
5 TABLET ORAL DAILY
Qty: 90 TABLET | Refills: 1 | Status: SHIPPED | OUTPATIENT
Start: 2024-08-13

## 2024-08-13 NOTE — PROGRESS NOTES
Subjective   The ABCs of the Annual Wellness Visit  Medicare Wellness Visit      Jeanine Guzman is a 69 y.o. patient who presents for a Medicare Wellness Visit.    The following portions of the patient's history were reviewed and   updated as appropriate: allergies, current medications, past family history, past medical history, past social history, past surgical history, and problem list.    Compared to one year ago, the patient's physical   health is the same.  Compared to one year ago, the patient's mental   health is the same.    Recent Hospitalizations:  She was not admitted to the hospital during the last year.     Current Medical Providers:  Patient Care Team:  Kori Patel DO as PCP - General (Family Medicine)    Outpatient Medications Prior to Visit   Medication Sig Dispense Refill    hyoscyamine (ANASPAZ,LEVSIN) 0.125 MG tablet Take 1 tablet by mouth Every 6 (Six) Hours As Needed for Diarrhea. 90 tablet 0    travoprost, BAK free, (TRAVATAN) 0.004 % solution ophthalmic solution Administer 0.004 drops to both eyes Daily.      trimethoprim (TRIMPEX) 100 MG tablet TAKE 1 TABLET BY MOUTH DAILY. 30 tablet 6    amLODIPine (NORVASC) 5 MG tablet Take 1 tablet by mouth Daily. 90 tablet 1    benazepril (LOTENSIN) 40 MG tablet Take 1 tablet by mouth Daily. 90 tablet 1    esomeprazole (nexIUM) 40 MG capsule Take 1 capsule by mouth Every Morning Before Breakfast. 90 capsule 0     No facility-administered medications prior to visit.     No opioid medication identified on active medication list. I have reviewed chart for other potential  high risk medication/s and harmful drug interactions in the elderly.      Aspirin is not on active medication list.  Aspirin use is not indicated based on review of current medical condition/s. Risk of harm outweighs potential benefits.  .    There is no problem list on file for this patient.    Advance Care Planning Advance Directive is not on file.  ACP discussion was held with  "the patient during this visit. Patient has an advance directive (not in EMR), copy requested.            Objective   Vitals:    24 0756   BP: 122/68   Pulse: 60   SpO2: 97%   Weight: 71.3 kg (157 lb 3.2 oz)   Height: 165.1 cm (65\")       Estimated body mass index is 26.16 kg/m² as calculated from the following:    Height as of this encounter: 165.1 cm (65\").    Weight as of this encounter: 71.3 kg (157 lb 3.2 oz).    BMI is >= 25 and <30. (Overweight) The following options were offered after discussion;: exercise counseling/recommendations and nutrition counseling/recommendations       Does the patient have evidence of cognitive impairment? No                                                                                                Health  Risk Assessment    Smoking Status:  Social History     Tobacco Use   Smoking Status Never    Passive exposure: Never   Smokeless Tobacco Never     Alcohol Consumption:  Social History     Substance and Sexual Activity   Alcohol Use No       Fall Risk Screen  STEADI Fall Risk Assessment was completed, and patient is at LOW risk for falls.Assessment completed on:2024    Depression Screenin/13/2024     7:55 AM   PHQ-2/PHQ-9 Depression Screening   Little Interest or Pleasure in Doing Things 0-->not at all   Feeling Down, Depressed or Hopeless 0-->not at all   PHQ-9: Brief Depression Severity Measure Score 0     Health Habits and Functional and Cognitive Screenin/6/2024    10:01 AM   Functional & Cognitive Status   Do you have difficulty preparing food and eating? No   Do you have difficulty bathing yourself, getting dressed or grooming yourself? No   Do you have difficulty using the toilet? No   Do you have difficulty moving around from place to place? No   Do you have trouble with steps or getting out of a bed or a chair? No   Current Diet Limited Junk Food   Dental Exam Up to date   Eye Exam Up to date   Exercise (times per week) 1 times per week "   Current Exercises Include Walking   Do you need help using the phone?  No   Are you deaf or do you have serious difficulty hearing?  No   Do you need help to go to places out of walking distance? No   Do you need help shopping? No   Do you need help preparing meals?  No   Do you need help with housework?  No   Do you need help with laundry? No   Do you need help taking your medications? No   Do you need help managing money? No   Do you ever drive or ride in a car without wearing a seat belt? No   Have you felt unusual stress, anger or loneliness in the last month? No   Who do you live with? Spouse   If you need help, do you have trouble finding someone available to you? No   Have you been bothered in the last four weeks by sexual problems? No   Do you have difficulty concentrating, remembering or making decisions? No           Age-appropriate Screening Schedule:  Refer to the list below for future screening recommendations based on patient's age, sex and/or medical conditions. Orders for these recommended tests are listed in the plan section. The patient has been provided with a written plan.    Health Maintenance List  Health Maintenance   Topic Date Due    TDAP/TD VACCINES (1 - Tdap) Never done    ZOSTER VACCINE (1 of 2) Never done    COVID-19 Vaccine (2 - 2023-24 season) 09/01/2023    ANNUAL WELLNESS VISIT  05/24/2024    LIPID PANEL  05/24/2024    INFLUENZA VACCINE  08/01/2024    DXA SCAN  01/01/2025 (Originally 3/15/2024)    Pneumococcal Vaccine 65+ (1 of 1 - PCV) 08/13/2025 (Originally 7/5/2020)    MAMMOGRAM  07/31/2025    BMI FOLLOWUP  08/13/2025    COLORECTAL CANCER SCREENING  11/11/2029    HEPATITIS C SCREENING  Completed                                                                                                                                                CMS Preventative Services Quick Reference  Risk Factors Identified During Encounter  Fall Risk-High or Moderate: Discussed Fall Prevention in the  home  Immunizations Discussed/Encouraged: Influenza, Prevnar 20 (Pneumococcal 20-valent conjugate), Shingrix, COVID19, and RSV (Respiratory Syncytial Virus)  Dental Screening Recommended  Vision Screening Recommended    The above risks/problems have been discussed with the patient.  Pertinent information has been shared with the patient in the After Visit Summary.  An After Visit Summary and PPPS were made available to the patient.    Follow Up:   Next Medicare Wellness visit to be scheduled in 1 year.         Additional E&M Note during same encounter follows:  Patient has additional, significant, and separately identifiable condition(s)/problem(s) that require work above and beyond the Medicare Wellness Visit     Chief Complaint  Medicare Wellness-subsequent    Subjective   HPI  Jeanine is also being seen today for an annual adult preventative physical exam.  and Jeanine is also being seen today for the following chronic health conditions:    Hypertension.  diagnosed in 2015 or 2016. She is currently taking amlodipine 5 mg daily and benazepril 40 mg daily. She denies any side effects from the medications. Her blood pressure is well controlled today. She does not monitor her blood pressure at home.    Gastroesophageal reflux disease. Began in 2017. She has tried over-the-counter medications in the past, which did not provide relief. She is currently taking Nexium 40 mg once daily, which provides relief. She has changed her diet to avoid fried foods and soda. She is avoiding meat. She is eating a plant-based diet. She had an endoscopy approximately 2020, which was normal.    Chronic recurrent UTIs. She started having more frequent UTIs in 2014.  She is currently taking trimethoprim for prevention. She saw Dr. Morales but patient did not follow thru with additional testing that Dr Morales wanted.        Hyperlipidemia.  The patient states she does not want to take a pill for her high cholesterol.          Objective  "  Vital Signs:  /68   Pulse 60   Ht 165.1 cm (65\")   Wt 71.3 kg (157 lb 3.2 oz)   SpO2 97%   BMI 26.16 kg/m²   Physical Exam  Vitals and nursing note reviewed.   Constitutional:       Appearance: Normal appearance. She is well-developed.   HENT:      Head: Normocephalic and atraumatic.      Right Ear: External ear normal.      Left Ear: External ear normal.      Nose: Nose normal.   Eyes:      General: No scleral icterus.     Conjunctiva/sclera: Conjunctivae normal.   Cardiovascular:      Rate and Rhythm: Normal rate and regular rhythm.      Heart sounds: Normal heart sounds.   Pulmonary:      Effort: Pulmonary effort is normal.      Breath sounds: Normal breath sounds.   Musculoskeletal:      Cervical back: Normal range of motion and neck supple.   Lymphadenopathy:      Cervical: No cervical adenopathy.   Skin:     General: Skin is warm and dry.      Findings: No rash.   Neurological:      Mental Status: She is alert and oriented to person, place, and time.   Psychiatric:         Mood and Affect: Mood normal.         Behavior: Behavior normal.         Thought Content: Thought content normal.         Judgment: Judgment normal.         The following data was reviewed by: Kori Patel DO on 08/13/2024:        Assessment and Plan Additional age appropriate preventative wellness advice topics were discussed during today's preventative wellness exam(some topics already addressed during AWV portion of the note above):    Physical Activity: Advised cardiovascular activity 150 minutes per week as tolerated. (example brisk walk for 30 minutes, 5 days a week).     Nutrition: Discussed nutrition plan with patient. Information shared in after visit summary. Goal is for a well balanced diet to enhance overall health.     Healthy Weight: Discussed current and goal BMI with patient. Steps to attain this goal discussed. Information shared in after visit summary.              Primary hypertension    Gastroesophageal " reflux disease without esophagitis    History of recurrent UTI (urinary tract infection)    Mixed hyperlipidemia     Refusal of statin medication by patient    Encounter for long-term (current) use of medications    Medicare annual wellness visit, subsequent      Orders Placed This Encounter   Procedures    TSH     Order Specific Question:   Release to patient     Answer:   Routine Release [[id number]]    Comprehensive Metabolic Panel     Order Specific Question:   Release to patient     Answer:   Routine Release [[id number]]    Lipid Panel     Order Specific Question:   Release to patient     Answer:   Routine Release [[id number]]    CBC & Differential     Order Specific Question:   Manual Differential     Answer:   No     Order Specific Question:   Release to patient     Answer:   Routine Release [[id number]]     New Medications Ordered This Visit   Medications    esomeprazole (nexIUM) 40 MG capsule     Sig: Take 1 capsule by mouth Every Morning Before Breakfast.     Dispense:  90 capsule     Refill:  1    benazepril (LOTENSIN) 40 MG tablet     Sig: Take 1 tablet by mouth Daily.     Dispense:  90 tablet     Refill:  1    amLODIPine (NORVASC) 5 MG tablet     Sig: Take 1 tablet by mouth Daily.     Dispense:  90 tablet     Refill:  1     RHM.  Declined mammogram this year.  Patient declined DEXA scan this year.  Patient is up-to-date with colon cancer screening.    Patient is also here to follow-up on chronic stable hypertension, GERD, recurrent UTIs, hyperlipidemia.  Surveillance labs were obtained today and any medication changes will be made based on lab results and will be called to the patient later this week.            Follow Up   Return in about 6 months (around 2/13/2025) for HTN.  Patient was given instructions and counseling regarding her condition or for health maintenance advice. Please see specific information pulled into the AVS if appropriate.

## 2024-08-14 LAB
ALBUMIN SERPL-MCNC: 4.7 G/DL (ref 3.5–5.2)
ALBUMIN/GLOB SERPL: 2 G/DL
ALP SERPL-CCNC: 99 U/L (ref 39–117)
ALT SERPL-CCNC: 14 U/L (ref 1–33)
AST SERPL-CCNC: 14 U/L (ref 1–32)
BASOPHILS # BLD AUTO: 0.02 10*3/MM3 (ref 0–0.2)
BASOPHILS NFR BLD AUTO: 0.3 % (ref 0–1.5)
BILIRUB SERPL-MCNC: 0.4 MG/DL (ref 0–1.2)
BUN SERPL-MCNC: 23 MG/DL (ref 8–23)
BUN/CREAT SERPL: 29.5 (ref 7–25)
CALCIUM SERPL-MCNC: 10.5 MG/DL (ref 8.6–10.5)
CHLORIDE SERPL-SCNC: 103 MMOL/L (ref 98–107)
CHOLEST SERPL-MCNC: 243 MG/DL (ref 0–200)
CO2 SERPL-SCNC: 28.2 MMOL/L (ref 22–29)
CREAT SERPL-MCNC: 0.78 MG/DL (ref 0.57–1)
EGFRCR SERPLBLD CKD-EPI 2021: 82.3 ML/MIN/1.73
EOSINOPHIL # BLD AUTO: 0.35 10*3/MM3 (ref 0–0.4)
EOSINOPHIL NFR BLD AUTO: 5.8 % (ref 0.3–6.2)
ERYTHROCYTE [DISTWIDTH] IN BLOOD BY AUTOMATED COUNT: 13 % (ref 12.3–15.4)
GLOBULIN SER CALC-MCNC: 2.4 GM/DL
GLUCOSE SERPL-MCNC: 97 MG/DL (ref 65–99)
HCT VFR BLD AUTO: 40.4 % (ref 34–46.6)
HDLC SERPL-MCNC: 50 MG/DL (ref 40–60)
HGB BLD-MCNC: 13.3 G/DL (ref 12–15.9)
IMM GRANULOCYTES # BLD AUTO: 0.01 10*3/MM3 (ref 0–0.05)
IMM GRANULOCYTES NFR BLD AUTO: 0.2 % (ref 0–0.5)
LDLC SERPL CALC-MCNC: 169 MG/DL (ref 0–100)
LYMPHOCYTES # BLD AUTO: 1.71 10*3/MM3 (ref 0.7–3.1)
LYMPHOCYTES NFR BLD AUTO: 28.5 % (ref 19.6–45.3)
MCH RBC QN AUTO: 27.7 PG (ref 26.6–33)
MCHC RBC AUTO-ENTMCNC: 32.9 G/DL (ref 31.5–35.7)
MCV RBC AUTO: 84.2 FL (ref 79–97)
MONOCYTES # BLD AUTO: 0.57 10*3/MM3 (ref 0.1–0.9)
MONOCYTES NFR BLD AUTO: 9.5 % (ref 5–12)
NEUTROPHILS # BLD AUTO: 3.35 10*3/MM3 (ref 1.7–7)
NEUTROPHILS NFR BLD AUTO: 55.7 % (ref 42.7–76)
NRBC BLD AUTO-RTO: 0 /100 WBC (ref 0–0.2)
PLATELET # BLD AUTO: 433 10*3/MM3 (ref 140–450)
POTASSIUM SERPL-SCNC: 5.1 MMOL/L (ref 3.5–5.2)
PROT SERPL-MCNC: 7.1 G/DL (ref 6–8.5)
RBC # BLD AUTO: 4.8 10*6/MM3 (ref 3.77–5.28)
SODIUM SERPL-SCNC: 142 MMOL/L (ref 136–145)
TRIGL SERPL-MCNC: 132 MG/DL (ref 0–150)
TSH SERPL DL<=0.005 MIU/L-ACNC: 2.04 UIU/ML (ref 0.27–4.2)
VLDLC SERPL CALC-MCNC: 24 MG/DL (ref 5–40)
WBC # BLD AUTO: 6.01 10*3/MM3 (ref 3.4–10.8)

## 2024-10-01 DIAGNOSIS — K21.9 GASTROESOPHAGEAL REFLUX DISEASE WITHOUT ESOPHAGITIS: ICD-10-CM

## 2024-10-01 RX ORDER — ESOMEPRAZOLE MAGNESIUM 40 MG/1
40 CAPSULE, DELAYED RELEASE ORAL
Qty: 90 CAPSULE | Refills: 1 | Status: SHIPPED | OUTPATIENT
Start: 2024-10-01

## 2025-04-07 DIAGNOSIS — I10 PRIMARY HYPERTENSION: ICD-10-CM

## 2025-04-08 RX ORDER — AMLODIPINE BESYLATE 5 MG/1
5 TABLET ORAL DAILY
Qty: 30 TABLET | Refills: 0 | Status: ON HOLD | OUTPATIENT
Start: 2025-04-08

## 2025-04-08 RX ORDER — BENAZEPRIL HYDROCHLORIDE 40 MG/1
40 TABLET ORAL DAILY
Qty: 30 TABLET | Refills: 0 | Status: ON HOLD | OUTPATIENT
Start: 2025-04-08

## 2025-04-08 NOTE — TELEPHONE ENCOUNTER
Rx Refill Note  Requested Prescriptions     Pending Prescriptions Disp Refills    benazepril (LOTENSIN) 40 MG tablet 90 tablet 1     Sig: Take 1 tablet by mouth Daily.    amLODIPine (NORVASC) 5 MG tablet 90 tablet 1     Sig: Take 1 tablet by mouth Daily.      Last office visit with prescribing clinician: 8/13/2024   Last telemedicine visit with prescribing clinician: Visit date not found   Next office visit with prescribing clinician: Visit date not found                         Would you like a call back once the refill request has been completed: [] Yes [] No    If the office needs to give you a call back, can they leave a voicemail: [] Yes [] No    Dora Mar MA  04/08/25, 08:26 EDT

## 2025-04-09 ENCOUNTER — APPOINTMENT (OUTPATIENT)
Dept: CT IMAGING | Facility: HOSPITAL | Age: 70
End: 2025-04-09
Payer: MEDICARE

## 2025-04-09 ENCOUNTER — HOSPITAL ENCOUNTER (INPATIENT)
Facility: HOSPITAL | Age: 70
LOS: 5 days | Discharge: HOME | End: 2025-04-16
Attending: EMERGENCY MEDICINE | Admitting: HOSPITALIST
Payer: MEDICARE

## 2025-04-09 DIAGNOSIS — R19.7 DIARRHEA, UNSPECIFIED TYPE: ICD-10-CM

## 2025-04-09 DIAGNOSIS — K62.5 BRBPR (BRIGHT RED BLOOD PER RECTUM): ICD-10-CM

## 2025-04-09 DIAGNOSIS — K52.9 COLITIS: Primary | ICD-10-CM

## 2025-04-09 LAB
ADV 40+41 DNA STL QL NAA+NON-PROBE: NOT DETECTED
ALBUMIN SERPL-MCNC: 4.7 G/DL (ref 3.5–5.2)
ALBUMIN/GLOB SERPL: 1.7 G/DL
ALP SERPL-CCNC: 130 U/L (ref 39–117)
ALT SERPL W P-5'-P-CCNC: 20 U/L (ref 1–33)
ANION GAP SERPL CALCULATED.3IONS-SCNC: 11.6 MMOL/L (ref 5–15)
AST SERPL-CCNC: 22 U/L (ref 1–32)
ASTRO TYP 1-8 RNA STL QL NAA+NON-PROBE: NOT DETECTED
BACTERIA UR QL AUTO: ABNORMAL /HPF
BASOPHILS # BLD AUTO: 0.02 10*3/MM3 (ref 0–0.2)
BASOPHILS NFR BLD AUTO: 0.1 % (ref 0–1.5)
BILIRUB SERPL-MCNC: 0.9 MG/DL (ref 0–1.2)
BILIRUB UR QL STRIP: NEGATIVE
BUN SERPL-MCNC: 26 MG/DL (ref 8–23)
BUN/CREAT SERPL: 32.9 (ref 7–25)
C CAYETANENSIS DNA STL QL NAA+NON-PROBE: NOT DETECTED
C COLI+JEJ+UPSA DNA STL QL NAA+NON-PROBE: NOT DETECTED
CALCIUM SPEC-SCNC: 9.4 MG/DL (ref 8.6–10.5)
CHLORIDE SERPL-SCNC: 102 MMOL/L (ref 98–107)
CLARITY UR: ABNORMAL
CO2 SERPL-SCNC: 25.4 MMOL/L (ref 22–29)
COLOR UR: YELLOW
CREAT SERPL-MCNC: 0.79 MG/DL (ref 0.57–1)
CRYPTOSP DNA STL QL NAA+NON-PROBE: NOT DETECTED
D-LACTATE SERPL-SCNC: 1.1 MMOL/L (ref 0.5–2)
DEPRECATED RDW RBC AUTO: 38.7 FL (ref 37–54)
E HISTOLYT DNA STL QL NAA+NON-PROBE: NOT DETECTED
EAEC PAA PLAS AGGR+AATA ST NAA+NON-PRB: NOT DETECTED
EC STX1+STX2 GENES STL QL NAA+NON-PROBE: NOT DETECTED
EGFRCR SERPLBLD CKD-EPI 2021: 81.1 ML/MIN/1.73
EOSINOPHIL # BLD AUTO: 0.01 10*3/MM3 (ref 0–0.4)
EOSINOPHIL NFR BLD AUTO: 0.1 % (ref 0.3–6.2)
EPEC EAE GENE STL QL NAA+NON-PROBE: NOT DETECTED
ERYTHROCYTE [DISTWIDTH] IN BLOOD BY AUTOMATED COUNT: 13 % (ref 12.3–15.4)
ETEC LTA+ST1A+ST1B TOX ST NAA+NON-PROBE: NOT DETECTED
G LAMBLIA DNA STL QL NAA+NON-PROBE: NOT DETECTED
GLOBULIN UR ELPH-MCNC: 2.8 GM/DL
GLUCOSE SERPL-MCNC: 108 MG/DL (ref 65–99)
GLUCOSE UR STRIP-MCNC: NEGATIVE MG/DL
HCT VFR BLD AUTO: 38.7 % (ref 34–46.6)
HGB BLD-MCNC: 13 G/DL (ref 12–15.9)
HGB UR QL STRIP.AUTO: ABNORMAL
HYALINE CASTS UR QL AUTO: ABNORMAL /LPF
IMM GRANULOCYTES # BLD AUTO: 0.05 10*3/MM3 (ref 0–0.05)
IMM GRANULOCYTES NFR BLD AUTO: 0.3 % (ref 0–0.5)
KETONES UR QL STRIP: ABNORMAL
LEUKOCYTE ESTERASE UR QL STRIP.AUTO: ABNORMAL
LIPASE SERPL-CCNC: 13 U/L (ref 13–60)
LYMPHOCYTES # BLD AUTO: 0.89 10*3/MM3 (ref 0.7–3.1)
LYMPHOCYTES NFR BLD AUTO: 5.7 % (ref 19.6–45.3)
MCH RBC QN AUTO: 27.7 PG (ref 26.6–33)
MCHC RBC AUTO-ENTMCNC: 33.6 G/DL (ref 31.5–35.7)
MCV RBC AUTO: 82.5 FL (ref 79–97)
MONOCYTES # BLD AUTO: 0.74 10*3/MM3 (ref 0.1–0.9)
MONOCYTES NFR BLD AUTO: 4.8 % (ref 5–12)
NEUTROPHILS NFR BLD AUTO: 13.79 10*3/MM3 (ref 1.7–7)
NEUTROPHILS NFR BLD AUTO: 89 % (ref 42.7–76)
NITRITE UR QL STRIP: NEGATIVE
NOROVIRUS GI+II RNA STL QL NAA+NON-PROBE: NOT DETECTED
NRBC BLD AUTO-RTO: 0 /100 WBC (ref 0–0.2)
P SHIGELLOIDES DNA STL QL NAA+NON-PROBE: NOT DETECTED
PH UR STRIP.AUTO: 7 [PH] (ref 5–8)
PLATELET # BLD AUTO: 407 10*3/MM3 (ref 140–450)
PMV BLD AUTO: 8.9 FL (ref 6–12)
POTASSIUM SERPL-SCNC: 3.7 MMOL/L (ref 3.5–5.2)
PROT SERPL-MCNC: 7.5 G/DL (ref 6–8.5)
PROT UR QL STRIP: NEGATIVE
RBC # BLD AUTO: 4.69 10*6/MM3 (ref 3.77–5.28)
RBC # UR STRIP: ABNORMAL /HPF
REF LAB TEST METHOD: ABNORMAL
RVA RNA STL QL NAA+NON-PROBE: NOT DETECTED
S ENT+BONG DNA STL QL NAA+NON-PROBE: NOT DETECTED
SAPO I+II+IV+V RNA STL QL NAA+NON-PROBE: NOT DETECTED
SHIGELLA SP+EIEC IPAH ST NAA+NON-PROBE: NOT DETECTED
SODIUM SERPL-SCNC: 139 MMOL/L (ref 136–145)
SP GR UR STRIP: >1.03 (ref 1–1.03)
SQUAMOUS #/AREA URNS HPF: ABNORMAL /HPF
UROBILINOGEN UR QL STRIP: ABNORMAL
V CHOL+PARA+VUL DNA STL QL NAA+NON-PROBE: NOT DETECTED
V CHOLERAE DNA STL QL NAA+NON-PROBE: NOT DETECTED
WBC # UR STRIP: ABNORMAL /HPF
WBC CLUMPS # UR AUTO: PRESENT /HPF
WBC NRBC COR # BLD AUTO: 15.5 10*3/MM3 (ref 3.4–10.8)
Y ENTEROCOL DNA STL QL NAA+NON-PROBE: NOT DETECTED

## 2025-04-09 PROCEDURE — 87507 IADNA-DNA/RNA PROBE TQ 12-25: CPT | Performed by: EMERGENCY MEDICINE

## 2025-04-09 PROCEDURE — 85025 COMPLETE CBC W/AUTO DIFF WBC: CPT | Performed by: EMERGENCY MEDICINE

## 2025-04-09 PROCEDURE — 99285 EMERGENCY DEPT VISIT HI MDM: CPT

## 2025-04-09 PROCEDURE — 25010000002 HYDROMORPHONE PER 4 MG: Performed by: NURSE PRACTITIONER

## 2025-04-09 PROCEDURE — G0378 HOSPITAL OBSERVATION PER HR: HCPCS

## 2025-04-09 PROCEDURE — 25010000002 HYDROMORPHONE PER 4 MG: Performed by: EMERGENCY MEDICINE

## 2025-04-09 PROCEDURE — 25010000002 ONDANSETRON PER 1 MG: Performed by: EMERGENCY MEDICINE

## 2025-04-09 PROCEDURE — 80053 COMPREHEN METABOLIC PANEL: CPT | Performed by: EMERGENCY MEDICINE

## 2025-04-09 PROCEDURE — 25810000003 SODIUM CHLORIDE 0.9 % SOLUTION: Performed by: EMERGENCY MEDICINE

## 2025-04-09 PROCEDURE — 25010000002 PIPERACILLIN SOD-TAZOBACTAM PER 1 G: Performed by: EMERGENCY MEDICINE

## 2025-04-09 PROCEDURE — 83605 ASSAY OF LACTIC ACID: CPT | Performed by: EMERGENCY MEDICINE

## 2025-04-09 PROCEDURE — 25510000001 IOPAMIDOL 61 % SOLUTION: Performed by: EMERGENCY MEDICINE

## 2025-04-09 PROCEDURE — 87040 BLOOD CULTURE FOR BACTERIA: CPT | Performed by: EMERGENCY MEDICINE

## 2025-04-09 PROCEDURE — 83690 ASSAY OF LIPASE: CPT | Performed by: EMERGENCY MEDICINE

## 2025-04-09 PROCEDURE — 81001 URINALYSIS AUTO W/SCOPE: CPT | Performed by: EMERGENCY MEDICINE

## 2025-04-09 PROCEDURE — 36415 COLL VENOUS BLD VENIPUNCTURE: CPT | Performed by: EMERGENCY MEDICINE

## 2025-04-09 PROCEDURE — 74177 CT ABD & PELVIS W/CONTRAST: CPT

## 2025-04-09 RX ORDER — SODIUM CHLORIDE AND POTASSIUM CHLORIDE 150; 900 MG/100ML; MG/100ML
75 INJECTION, SOLUTION INTRAVENOUS CONTINUOUS
Status: DISPENSED | OUTPATIENT
Start: 2025-04-09 | End: 2025-04-12

## 2025-04-09 RX ORDER — HYDROMORPHONE HYDROCHLORIDE 1 MG/ML
0.5 INJECTION, SOLUTION INTRAMUSCULAR; INTRAVENOUS; SUBCUTANEOUS ONCE
Refills: 0 | Status: COMPLETED | OUTPATIENT
Start: 2025-04-09 | End: 2025-04-09

## 2025-04-09 RX ORDER — LATANOPROST 50 UG/ML
1 SOLUTION/ DROPS OPHTHALMIC NIGHTLY
Status: DISCONTINUED | OUTPATIENT
Start: 2025-04-10 | End: 2025-04-16 | Stop reason: HOSPADM

## 2025-04-09 RX ORDER — SODIUM CHLORIDE 0.9 % (FLUSH) 0.9 %
10 SYRINGE (ML) INJECTION AS NEEDED
Status: DISCONTINUED | OUTPATIENT
Start: 2025-04-09 | End: 2025-04-16 | Stop reason: HOSPADM

## 2025-04-09 RX ORDER — LISINOPRIL 20 MG/1
40 TABLET ORAL
Status: DISCONTINUED | OUTPATIENT
Start: 2025-04-10 | End: 2025-04-16 | Stop reason: HOSPADM

## 2025-04-09 RX ORDER — ONDANSETRON 2 MG/ML
4 INJECTION INTRAMUSCULAR; INTRAVENOUS EVERY 6 HOURS PRN
Status: DISCONTINUED | OUTPATIENT
Start: 2025-04-09 | End: 2025-04-16 | Stop reason: HOSPADM

## 2025-04-09 RX ORDER — SODIUM CHLORIDE 0.9 % (FLUSH) 0.9 %
10 SYRINGE (ML) INJECTION EVERY 12 HOURS SCHEDULED
Status: DISCONTINUED | OUTPATIENT
Start: 2025-04-09 | End: 2025-04-16 | Stop reason: HOSPADM

## 2025-04-09 RX ORDER — IOPAMIDOL 612 MG/ML
100 INJECTION, SOLUTION INTRAVASCULAR
Status: COMPLETED | OUTPATIENT
Start: 2025-04-09 | End: 2025-04-09

## 2025-04-09 RX ORDER — HYDROMORPHONE HYDROCHLORIDE 1 MG/ML
0.5 INJECTION, SOLUTION INTRAMUSCULAR; INTRAVENOUS; SUBCUTANEOUS
Status: DISCONTINUED | OUTPATIENT
Start: 2025-04-09 | End: 2025-04-10

## 2025-04-09 RX ORDER — ONDANSETRON 2 MG/ML
4 INJECTION INTRAMUSCULAR; INTRAVENOUS ONCE
Status: COMPLETED | OUTPATIENT
Start: 2025-04-09 | End: 2025-04-09

## 2025-04-09 RX ORDER — AMLODIPINE BESYLATE 5 MG/1
5 TABLET ORAL DAILY
Status: DISCONTINUED | OUTPATIENT
Start: 2025-04-10 | End: 2025-04-16 | Stop reason: HOSPADM

## 2025-04-09 RX ORDER — SODIUM CHLORIDE 9 MG/ML
40 INJECTION, SOLUTION INTRAVENOUS AS NEEDED
Status: DISCONTINUED | OUTPATIENT
Start: 2025-04-09 | End: 2025-04-16 | Stop reason: HOSPADM

## 2025-04-09 RX ORDER — ONDANSETRON 4 MG/1
4 TABLET, ORALLY DISINTEGRATING ORAL EVERY 6 HOURS PRN
Status: DISCONTINUED | OUTPATIENT
Start: 2025-04-09 | End: 2025-04-16 | Stop reason: HOSPADM

## 2025-04-09 RX ADMIN — PIPERACILLIN AND TAZOBACTAM 3.38 G: 3; .375 INJECTION, POWDER, FOR SOLUTION INTRAVENOUS at 21:37

## 2025-04-09 RX ADMIN — HYDROMORPHONE HYDROCHLORIDE 0.5 MG: 1 INJECTION, SOLUTION INTRAMUSCULAR; INTRAVENOUS; SUBCUTANEOUS at 23:51

## 2025-04-09 RX ADMIN — IOPAMIDOL 85 ML: 612 INJECTION, SOLUTION INTRAVENOUS at 18:31

## 2025-04-09 RX ADMIN — ONDANSETRON 4 MG: 2 INJECTION, SOLUTION INTRAMUSCULAR; INTRAVENOUS at 17:18

## 2025-04-09 RX ADMIN — SODIUM CHLORIDE 500 ML: 9 INJECTION, SOLUTION INTRAVENOUS at 17:19

## 2025-04-09 RX ADMIN — HYDROMORPHONE HYDROCHLORIDE 0.5 MG: 1 INJECTION, SOLUTION INTRAMUSCULAR; INTRAVENOUS; SUBCUTANEOUS at 17:21

## 2025-04-09 RX ADMIN — Medication 10 ML: at 21:24

## 2025-04-09 RX ADMIN — HYDROMORPHONE HYDROCHLORIDE 0.5 MG: 1 INJECTION, SOLUTION INTRAMUSCULAR; INTRAVENOUS; SUBCUTANEOUS at 21:24

## 2025-04-09 NOTE — ED PROVIDER NOTES
EMERGENCY DEPARTMENT ENCOUNTER  Room Number:  117/1  PCP: Kori Patel DO  Independent Historians: Patient      HPI:  Chief Complaint: had concerns including Abdominal Pain.     A complete HPI/ROS/PMH/PSH/SH/FH are unobtainable due to: Nothing      Context: Jeanine Guzman is a 69 y.o. female with a medical history of diverticulosis, GERD who presents to the ED c/o acute abdominal pain, vomiting, diarrhea, bright red blood per rectum.  All symptoms began around 1030 this morning.  She reports chills but does not know if she has had a fever.  Pain is more in the epigastric region.  She states a history of diverticulitis in the past.  She believes that her last colonoscopy was circa 2017 in [facility].  She recalls having 1 polyp at the time.  She had an appendectomy as a child, no other abdominal surgeries.      Review of prior external notes (non-ED) -and- Review of prior external test results outside of this encounter: I reviewed outside hospital ED visit from 3/1/2024.  Patient had presented with complaint of chest pain.        PAST MEDICAL HISTORY  Active Ambulatory Problems     Diagnosis Date Noted    No Active Ambulatory Problems     Resolved Ambulatory Problems     Diagnosis Date Noted    No Resolved Ambulatory Problems     Past Medical History:   Diagnosis Date    Allergic     Anxiety     Colon polyp 11/11/2022    Diverticulitis of colon     Diverticulosis 2010    GERD (gastroesophageal reflux disease) 2016    Glaucoma 2018    Headache     Hyperlipidemia October 2022    Hypertension     Irritable bowel syndrome 2017         PAST SURGICAL HISTORY  Past Surgical History:   Procedure Laterality Date    APPENDECTOMY  1958    COLONOSCOPY  11/11/22    EYE SURGERY  2019    FRACTURE SURGERY  2010    Broken wrist    HYSTERECTOMY      WRIST SURGERY Left 2010         FAMILY HISTORY  Family History   Problem Relation Age of Onset    Heart disease Mother     Cancer Father     Hyperlipidemia Sister           SOCIAL HISTORY  Social History     Socioeconomic History    Marital status:      Spouse name: Bret    Number of children: 0   Tobacco Use    Smoking status: Never     Passive exposure: Never    Smokeless tobacco: Never   Vaping Use    Vaping status: Never Used   Substance and Sexual Activity    Alcohol use: No    Drug use: Never    Sexual activity: Not Currently     Partners: Male     Comment: Not applicable         ALLERGIES  Latex, Morphine, and Sulfa antibiotics      REVIEW OF SYSTEMS  Review of all 14 systems is negative other than stated in the HPI above.      PHYSICAL EXAM    I have reviewed the triage vital signs and nursing notes.    ED Triage Vitals   Temp Heart Rate Resp BP SpO2   04/09/25 1633 04/09/25 1633 04/09/25 1633 04/09/25 1636 04/09/25 1633   97.8 °F (36.6 °C) 100 18 154/69 95 %      Temp src Heart Rate Source Patient Position BP Location FiO2 (%)   04/09/25 1633 04/09/25 1633 -- -- --   Tympanic Monitor            GENERAL: awake and alert, no acute distress  HENT: Normocephalic, atraumatic  EYES: no scleral icterus  CV: regular rhythm, regular rate  RESPIRATORY: normal effort  ABDOMEN: soft, mild epigastric tenderness without rebound or guarding, no palpable hernia or mass  MUSCULOSKELETAL: no deformity  NEURO: alert, moves all extremities, follows commands  PSYCH: calm, cooperative  SKIN: Warm, dry          LAB RESULTS  Recent Results (from the past 24 hours)   Comprehensive Metabolic Panel    Collection Time: 04/09/25  5:10 PM    Specimen: Blood   Result Value Ref Range    Glucose 108 (H) 65 - 99 mg/dL    BUN 26 (H) 8 - 23 mg/dL    Creatinine 0.79 0.57 - 1.00 mg/dL    Sodium 139 136 - 145 mmol/L    Potassium 3.7 3.5 - 5.2 mmol/L    Chloride 102 98 - 107 mmol/L    CO2 25.4 22.0 - 29.0 mmol/L    Calcium 9.4 8.6 - 10.5 mg/dL    Total Protein 7.5 6.0 - 8.5 g/dL    Albumin 4.7 3.5 - 5.2 g/dL    ALT (SGPT) 20 1 - 33 U/L    AST (SGOT) 22 1 - 32 U/L    Alkaline Phosphatase 130 (H) 39  - 117 U/L    Total Bilirubin 0.9 0.0 - 1.2 mg/dL    Globulin 2.8 gm/dL    A/G Ratio 1.7 g/dL    BUN/Creatinine Ratio 32.9 (H) 7.0 - 25.0    Anion Gap 11.6 5.0 - 15.0 mmol/L    eGFR 81.1 >60.0 mL/min/1.73   Lipase    Collection Time: 04/09/25  5:10 PM    Specimen: Blood   Result Value Ref Range    Lipase 13 13 - 60 U/L   CBC Auto Differential    Collection Time: 04/09/25  5:10 PM    Specimen: Blood   Result Value Ref Range    WBC 15.50 (H) 3.40 - 10.80 10*3/mm3    RBC 4.69 3.77 - 5.28 10*6/mm3    Hemoglobin 13.0 12.0 - 15.9 g/dL    Hematocrit 38.7 34.0 - 46.6 %    MCV 82.5 79.0 - 97.0 fL    MCH 27.7 26.6 - 33.0 pg    MCHC 33.6 31.5 - 35.7 g/dL    RDW 13.0 12.3 - 15.4 %    RDW-SD 38.7 37.0 - 54.0 fl    MPV 8.9 6.0 - 12.0 fL    Platelets 407 140 - 450 10*3/mm3    Neutrophil % 89.0 (H) 42.7 - 76.0 %    Lymphocyte % 5.7 (L) 19.6 - 45.3 %    Monocyte % 4.8 (L) 5.0 - 12.0 %    Eosinophil % 0.1 (L) 0.3 - 6.2 %    Basophil % 0.1 0.0 - 1.5 %    Immature Grans % 0.3 0.0 - 0.5 %    Neutrophils, Absolute 13.79 (H) 1.70 - 7.00 10*3/mm3    Lymphocytes, Absolute 0.89 0.70 - 3.10 10*3/mm3    Monocytes, Absolute 0.74 0.10 - 0.90 10*3/mm3    Eosinophils, Absolute 0.01 0.00 - 0.40 10*3/mm3    Basophils, Absolute 0.02 0.00 - 0.20 10*3/mm3    Immature Grans, Absolute 0.05 0.00 - 0.05 10*3/mm3    nRBC 0.0 0.0 - 0.2 /100 WBC   Urinalysis With Microscopic If Indicated (No Culture) - Urine, Clean Catch    Collection Time: 04/09/25  7:22 PM    Specimen: Urine, Clean Catch   Result Value Ref Range    Color, UA Yellow Yellow, Straw    Appearance, UA Cloudy (A) Clear    pH, UA 7.0 5.0 - 8.0    Specific Gravity, UA >1.030 (H) 1.005 - 1.030    Glucose, UA Negative Negative    Ketones, UA 15 mg/dL (1+) (A) Negative    Bilirubin, UA Negative Negative    Blood, UA Trace (A) Negative    Protein, UA Negative Negative    Leuk Esterase, UA Moderate (2+) (A) Negative    Nitrite, UA Negative Negative    Urobilinogen, UA 0.2 E.U./dL 0.2 - 1.0 E.U./dL    Urinalysis, Microscopic Only - Urine, Clean Catch    Collection Time: 04/09/25  7:22 PM    Specimen: Urine, Clean Catch   Result Value Ref Range    RBC, UA 11-20 (A) None Seen, 0-2 /HPF    WBC, UA Too Numerous to Count (A) None Seen, 0-2 /HPF    Bacteria, UA 3+ (A) None Seen /HPF    Squamous Epithelial Cells, UA 7-12 (A) None Seen, 0-2 /HPF    Hyaline Casts, UA None Seen None Seen /LPF    WBC Clumps, UA Present None Seen /HPF    Methodology Automated Microscopy    Lactic Acid, Plasma    Collection Time: 04/09/25  7:40 PM    Specimen: Blood   Result Value Ref Range    Lactate 1.1 0.5 - 2.0 mmol/L       The above labs were ordered by me and independently reviewed by me.     RADIOLOGY  CT Abdomen Pelvis With Contrast  Result Date: 4/9/2025  CT ABDOMEN AND PELVIS WITH IV CONTRAST  HISTORY: Abdominal pain, vomiting, diarrhea and bright red blood per rectum.  TECHNIQUE: Radiation dose reduction techniques were utilized, including automated exposure control and exposure modulation based on body size. 3 mm images were obtained through the abdomen and pelvis after the administration of IV contrast.  COMPARISON: None available   FINDINGS: Left renal cyst. Right hepatic cyst. Subcentimeter hypodense left hepatic lesion too small to characterize. Confluent moderate circumferential wall thickening extending from the distal transverse colon throughout the descending colon (series 2/image 71). More mild circumferential wall thickening throughout the remaining more proximal transverse colon. Prominence of the associated vasa recti with minimal pericolonic inflammatory changes. Sigmoid colon diverticulosis. More isolated short segment distal sigmoid colon suspected masslike wall thickening (series 2/image 107). Decompressed state of the colon does limit evaluation. Appendectomy. Patent mesenteric vasculature. No pneumatosis, portal venous gas or free intraperitoneal air. Hysterectomy. Remaining solid organs and bowel are normal.  No abdominal pelvic lymphadenopathy. No focal osseous abnormality.        1. Moderate colitis involving the descending colon and to a lesser extent, the transverse colon. 2. More isolated short segment distal sigmoid colon suspected masslike wall thickening. This needs a follow-up colonoscopy to exclude an underlying lesion. Decompressed state of the colon does limit evaluation. 3. Sigmoid colon diverticulosis.  This report was finalized on 4/9/2025 8:12 PM by Dr. Freddie Basurto M.D on Workstation: [id number]        The above radiology studies were ordered by me.  See ED course for independent interpretations.     MEDICATIONS GIVEN IN ER  Medications   sodium chloride 0.9 % flush 10 mL (has no administration in time range)   sodium chloride 0.9 % flush 10 mL (10 mL Intravenous Given 4/9/25 2124)   sodium chloride 0.9 % flush 10 mL (has no administration in time range)   sodium chloride 0.9 % infusion 40 mL (has no administration in time range)   ondansetron ODT (ZOFRAN-ODT) disintegrating tablet 4 mg (has no administration in time range)     Or   ondansetron (ZOFRAN) injection 4 mg (has no administration in time range)   piperacillin-tazobactam (ZOSYN) 3.375 g IVPB in 100 mL NS MBP (CD) (has no administration in time range)   HYDROmorphone (DILAUDID) injection 0.5 mg (has no administration in time range)   sodium chloride 0.9 % with KCl 20 mEq/L infusion (has no administration in time range)   HYDROmorphone (DILAUDID) injection 0.5 mg (0.5 mg Intravenous Given 4/9/25 1721)   ondansetron (ZOFRAN) injection 4 mg (4 mg Intravenous Given 4/9/25 1718)   sodium chloride 0.9 % bolus 500 mL (0 mL Intravenous Stopped 4/9/25 2125)   iopamidol (ISOVUE-300) 61 % injection 100 mL (85 mL Intravenous Given 4/9/25 1831)   piperacillin-tazobactam (ZOSYN) 3.375 g IVPB in 100 mL NS MBP (CD) (3.375 g Intravenous New Bag 4/9/25 2137)   HYDROmorphone (DILAUDID) injection 0.5 mg (0.5 mg Intravenous Given 4/9/25 2124)         ORDERS PLACED  DURING THIS VISIT:  Orders Placed This Encounter   Procedures    Gastrointestinal Panel, PCR - Stool, Per Rectum    Blood Culture - Blood,    Blood Culture - Blood,    CT Abdomen Pelvis With Contrast    Comprehensive Metabolic Panel    Lipase    Urinalysis With Microscopic If Indicated (No Culture) - Urine, Clean Catch    CBC Auto Differential    Lactic Acid, Plasma    Urinalysis, Microscopic Only - Urine, Clean Catch    CBC (No Diff)    Basic Metabolic Panel    NPO Diet NPO Type: Strict NPO    Diet: Liquid; Clear Liquid; Fluid Consistency: Thin (IDDSI 0)    Intake & Output    Weigh Patient    Oral Care    Saline Lock & Maintain IV Access    Vital Signs    Up With Assistance    Code Status and Medical Interventions: CPR (Attempt to Resuscitate); Full Support    Inpatient Gastroenterology Consult    Insert Peripheral IV    Insert Peripheral IV    Initiate Emergency Department Observation Status    CBC & Differential         OUTPATIENT MEDICATION MANAGEMENT:  Current Facility-Administered Medications Ordered in Epic   Medication Dose Route Frequency Provider Last Rate Last Admin    HYDROmorphone (DILAUDID) injection 0.5 mg  0.5 mg Intravenous Q3H PRN Shirley Clinton APRN        ondansetron ODT (ZOFRAN-ODT) disintegrating tablet 4 mg  4 mg Oral Q6H PRN Shirley Clinton APRN        Or    ondansetron (ZOFRAN) injection 4 mg  4 mg Intravenous Q6H PRN Shirley Clinton APRN        [START ON 4/10/2025] piperacillin-tazobactam (ZOSYN) 3.375 g IVPB in 100 mL NS MBP (CD)  3.375 g Intravenous Q8H Shirley Clinton APRN        sodium chloride 0.9 % flush 10 mL  10 mL Intravenous PRN Thierno Simeon MD        sodium chloride 0.9 % flush 10 mL  10 mL Intravenous Q12H Shirley Clinton APRN   10 mL at 04/09/25 2124    sodium chloride 0.9 % flush 10 mL  10 mL Intravenous PRN Shirley Clinton APRN        sodium chloride 0.9 % infusion 40 mL  40 mL Intravenous PRN Shirley Clinton APRN        sodium chloride 0.9 % with KCl 20  mEq/L infusion  75 mL/hr Intravenous Continuous Shirley Clinton APRN         No current [facility]-ordered outpatient medications on file.         PROCEDURES  Procedures            PROGRESS, DATA ANALYSIS, CONSULTS, AND MEDICAL DECISION MAKING  All labs have been independently interpreted by me.  All radiology studies have been reviewed by me. All EKG's have been independently viewed and interpreted by me.  Discussion below represents my analysis of pertinent findings related to patient's condition, differential diagnosis, treatment plan and final disposition.      Differential diagnosis includes but is not limited to:  Diverticulosis  Colitis  Gastritis  Peptic ulcer disease      Clinical Scores:                  ED Course as of 04/09/25 2257   Wed Apr 09, 2025 1902 WBC(!): 15.50 [JR]   1902 Lipase: 13 [JR]   1904 CT abdomen pelvis independently interpreted in PACS.  There appears to be colitis involving the distal transverse and descending colon. [JR]   2039 WBC, UA(!): Too Numerous to Count [JR]   2040 Lactate: 1.1 [JR]   2100 Patient reassessed informed of plan for admission.  She has had no further bleeding or diarrhea. [JR]   2110 Discussed with FREDDIE Oneal in the ED observation unit who agrees to admit for further evaluation. [JR]      ED Course User Index  [JR] Thierno Simeon MD             AS OF 22:57 EDT VITALS:    BP - 142/68  HR - 90  TEMP - 98 °F (36.7 °C) (Oral)  O2 SATS - 97%    COMPLEXITY OF CARE  The patient requires admission.      Chronic or social conditions impacting patient care (Social Determinants of Health):     DIAGNOSIS  Final diagnoses:   Colitis   BRBPR (bright red blood per rectum)           DISPOSITION  Admit      Prescription drug monitoring program review:           Please note that portions of this document were completed with a voice recognition program.    Note Disclaimer: At [facility], we believe that sharing information builds trust and better relationships. You  are receiving this note because you recently visited [facility]. It is possible you will see health information before a provider has talked with you about it. This kind of information can be easy to misunderstand. To help you fully understand what it means for your health, we urge you to discuss this note with your provider.         Thierno Simeon MD  04/09/25 6634

## 2025-04-10 PROBLEM — R19.7 DIARRHEA: Status: ACTIVE | Noted: 2025-04-09

## 2025-04-10 PROBLEM — K62.5 BRBPR (BRIGHT RED BLOOD PER RECTUM): Status: ACTIVE | Noted: 2025-04-09

## 2025-04-10 LAB
ANION GAP SERPL CALCULATED.3IONS-SCNC: 10.5 MMOL/L (ref 5–15)
BUN SERPL-MCNC: 20 MG/DL (ref 8–23)
BUN/CREAT SERPL: 29 (ref 7–25)
CALCIUM SPEC-SCNC: 9.2 MG/DL (ref 8.6–10.5)
CHLORIDE SERPL-SCNC: 105 MMOL/L (ref 98–107)
CO2 SERPL-SCNC: 24.5 MMOL/L (ref 22–29)
CREAT SERPL-MCNC: 0.69 MG/DL (ref 0.57–1)
DEPRECATED RDW RBC AUTO: 39.4 FL (ref 37–54)
EGFRCR SERPLBLD CKD-EPI 2021: 94.1 ML/MIN/1.73
ERYTHROCYTE [DISTWIDTH] IN BLOOD BY AUTOMATED COUNT: 13.1 % (ref 12.3–15.4)
GLUCOSE SERPL-MCNC: 102 MG/DL (ref 65–99)
HCT VFR BLD AUTO: 38.9 % (ref 34–46.6)
HGB BLD-MCNC: 13.1 G/DL (ref 12–15.9)
MCH RBC QN AUTO: 28.1 PG (ref 26.6–33)
MCHC RBC AUTO-ENTMCNC: 33.7 G/DL (ref 31.5–35.7)
MCV RBC AUTO: 83.3 FL (ref 79–97)
PLATELET # BLD AUTO: 379 10*3/MM3 (ref 140–450)
PMV BLD AUTO: 9.1 FL (ref 6–12)
POTASSIUM SERPL-SCNC: 3.7 MMOL/L (ref 3.5–5.2)
RBC # BLD AUTO: 4.67 10*6/MM3 (ref 3.77–5.28)
SODIUM SERPL-SCNC: 140 MMOL/L (ref 136–145)
WBC NRBC COR # BLD AUTO: 15.27 10*3/MM3 (ref 3.4–10.8)

## 2025-04-10 PROCEDURE — 25010000002 SODIUM CHLORIDE 0.9 % WITH KCL 20 MEQ 20-0.9 MEQ/L-% SOLUTION: Performed by: NURSE PRACTITIONER

## 2025-04-10 PROCEDURE — 25010000002 HYDROMORPHONE PER 4 MG: Performed by: NURSE PRACTITIONER

## 2025-04-10 PROCEDURE — 25010000002 PIPERACILLIN SOD-TAZOBACTAM PER 1 G: Performed by: NURSE PRACTITIONER

## 2025-04-10 PROCEDURE — 80048 BASIC METABOLIC PNL TOTAL CA: CPT | Performed by: NURSE PRACTITIONER

## 2025-04-10 PROCEDURE — G0378 HOSPITAL OBSERVATION PER HR: HCPCS

## 2025-04-10 PROCEDURE — 99204 OFFICE O/P NEW MOD 45 MIN: CPT | Performed by: NURSE PRACTITIONER

## 2025-04-10 PROCEDURE — 85027 COMPLETE CBC AUTOMATED: CPT | Performed by: NURSE PRACTITIONER

## 2025-04-10 RX ORDER — DICYCLOMINE HYDROCHLORIDE 10 MG/1
20 CAPSULE ORAL 3 TIMES DAILY PRN
Status: DISCONTINUED | OUTPATIENT
Start: 2025-04-10 | End: 2025-04-16 | Stop reason: HOSPADM

## 2025-04-10 RX ORDER — HYDROMORPHONE HYDROCHLORIDE 1 MG/ML
0.5 INJECTION, SOLUTION INTRAMUSCULAR; INTRAVENOUS; SUBCUTANEOUS
Status: DISCONTINUED | OUTPATIENT
Start: 2025-04-10 | End: 2025-04-16 | Stop reason: HOSPADM

## 2025-04-10 RX ORDER — POLYETHYLENE GLYCOL 3350 17 G/17G
0.5 POWDER, FOR SOLUTION ORAL EVERY 12 HOURS
Status: COMPLETED | OUTPATIENT
Start: 2025-04-10 | End: 2025-04-11

## 2025-04-10 RX ORDER — HYDROCODONE BITARTRATE AND ACETAMINOPHEN 5; 325 MG/1; MG/1
1 TABLET ORAL EVERY 4 HOURS PRN
Refills: 0 | Status: DISCONTINUED | OUTPATIENT
Start: 2025-04-10 | End: 2025-04-16 | Stop reason: HOSPADM

## 2025-04-10 RX ADMIN — LISINOPRIL 40 MG: 20 TABLET ORAL at 09:09

## 2025-04-10 RX ADMIN — HYDROCODONE BITARTRATE AND ACETAMINOPHEN 1 TABLET: 5; 325 TABLET ORAL at 21:12

## 2025-04-10 RX ADMIN — AMLODIPINE BESYLATE 5 MG: 5 TABLET ORAL at 09:09

## 2025-04-10 RX ADMIN — LATANOPROST 1 DROP: 50 SOLUTION/ DROPS OPHTHALMIC at 21:12

## 2025-04-10 RX ADMIN — LATANOPROST 1 DROP: 50 SOLUTION/ DROPS OPHTHALMIC at 00:54

## 2025-04-10 RX ADMIN — PIPERACILLIN AND TAZOBACTAM 3.38 G: 3; .375 INJECTION, POWDER, FOR SOLUTION INTRAVENOUS at 05:00

## 2025-04-10 RX ADMIN — SODIUM CHLORIDE AND POTASSIUM CHLORIDE 75 ML/HR: 150; 900 INJECTION, SOLUTION INTRAVENOUS at 01:17

## 2025-04-10 RX ADMIN — PIPERACILLIN AND TAZOBACTAM 3.38 G: 3; .375 INJECTION, POWDER, FOR SOLUTION INTRAVENOUS at 23:22

## 2025-04-10 RX ADMIN — HYDROCODONE BITARTRATE AND ACETAMINOPHEN 1 TABLET: 5; 325 TABLET ORAL at 11:24

## 2025-04-10 RX ADMIN — HYDROCODONE BITARTRATE AND ACETAMINOPHEN 1 TABLET: 5; 325 TABLET ORAL at 05:10

## 2025-04-10 RX ADMIN — PIPERACILLIN AND TAZOBACTAM 3.38 G: 3; .375 INJECTION, POWDER, FOR SOLUTION INTRAVENOUS at 16:09

## 2025-04-10 RX ADMIN — POLYETHYLENE GLYCOL 3350 0.5 BOTTLE: 17 POWDER, FOR SOLUTION ORAL at 21:14

## 2025-04-10 RX ADMIN — Medication 10 ML: at 09:30

## 2025-04-10 RX ADMIN — HYDROMORPHONE HYDROCHLORIDE 0.5 MG: 1 INJECTION, SOLUTION INTRAMUSCULAR; INTRAVENOUS; SUBCUTANEOUS at 03:27

## 2025-04-10 NOTE — PLAN OF CARE
Goal Outcome Evaluation:              Outcome Evaluation: Pt. to stay for further treatment.  IV infusing. Pt AOX4 and able to verbalized needs. Pt does not have any other questions at this time.

## 2025-04-10 NOTE — PROGRESS NOTES
ED OBSERVATION PROGRESS/DISCHARGE SUMMARY    Date of Admission: 4/9/2025   LOS: 0 days   PCP: Kori Patel DO    Working diagnosis: Infectious colitis      Subjective     Hospital Outcome: Pending    Jeanine Guzman is a 69 y.o. female who was admitted to the ED observation unit with abdominal pain and rectal bleeding.  CT scan showed a moderate colitis involving the descending and transverse colon.  Distal sigmoid colon there was a suspected masslike wall thickening colonoscopy follow-up was recommended.  WBC was 15.5, lactate negative, creatinine 0.79.  Blood cultures pending.  GI panel was negative.  C. difficile toxin study pending.  Patient currently being treated with IV Zosyn and analgesics/antiemetics as needed.    ROS:  General: no fevers, chills  Respiratory: no cough, dyspnea  Cardiovascular: no chest pain, palpitations  Abdomen: Abdominal pain, rectal bleeding  Neurologic: No focal weakness    3:42 PM.  Patient has been evaluated by GI.  There is a plan for colonoscopy in the morning for 11/2025.  To continue with IV hydration and antibiotics.  Dicyclomine as needed for cramping relief.  Patient resting quietly at this time in no acute distress.  VSS, /51, HR 74, SpO2 98%, RR 16    Objective   Physical Exam:  I have reviewed the vital signs.  Temp:  [97.6 °F (36.4 °C)-99.3 °F (37.4 °C)] 98.2 °F (36.8 °C)  Heart Rate:  [] 74  Resp:  [16-18] 16  BP: (124-154)/(51-75) 124/51  General Appearance:    Alert, cooperative, no distress  Head:    Normocephalic, atraumatic  Eyes:    Sclerae anicteric  Neck:   Supple, no mass  Lungs: Clear to auscultation bilaterally, respirations unlabored  Heart: Regular rate and rhythm, S1 and S2 normal, no murmur, rub or gallop  Abdomen: Mild tenderness, no guarding, no rebound tenderness.  Extremities: No clubbing, cyanosis, or edema to lower extremities  Pulses:  2+ and symmetric in distal lower extremities  Skin: No rashes   Neurologic: Oriented x3, Normal  strength to extremities    Results Review:    I have reviewed the labs, radiology results and diagnostic studies.    Results from last 7 days   Lab Units 04/10/25  0430   WBC 10*3/mm3 15.27*   HEMOGLOBIN g/dL 13.1   HEMATOCRIT % 38.9   PLATELETS 10*3/mm3 379     Results from last 7 days   Lab Units 04/10/25  0430 04/09/25  1710   SODIUM mmol/L 140 139   POTASSIUM mmol/L 3.7 3.7   CHLORIDE mmol/L 105 102   CO2 mmol/L 24.5 25.4   BUN mg/dL 20 26*   CREATININE mg/dL 0.69 0.79   CALCIUM mg/dL 9.2 9.4   BILIRUBIN mg/dL  --  0.9   ALK PHOS U/L  --  130*   ALT (SGPT) U/L  --  20   AST (SGOT) U/L  --  22   GLUCOSE mg/dL 102* 108*     Imaging Results (Last 24 Hours)       Procedure Component Value Units Date/Time    CT Abdomen Pelvis With Contrast [[id number]] Collected: 04/09/25 1957     Updated: 04/09/25 2015    Narrative:      CT ABDOMEN AND PELVIS WITH IV CONTRAST     HISTORY: Abdominal pain, vomiting, diarrhea and bright red blood per  rectum.     TECHNIQUE: Radiation dose reduction techniques were utilized, including  automated exposure control and exposure modulation based on body size.   3 mm images were obtained through the abdomen and pelvis after the  administration of IV contrast.     COMPARISON: None available        FINDINGS: Left renal cyst. Right hepatic cyst. Subcentimeter hypodense  left hepatic lesion too small to characterize. Confluent moderate  circumferential wall thickening extending from the distal transverse  colon throughout the descending colon (series 2/image 71). More mild  circumferential wall thickening throughout the remaining more proximal  transverse colon. Prominence of the associated vasa recti with minimal  pericolonic inflammatory changes. Sigmoid colon diverticulosis. More  isolated short segment distal sigmoid colon suspected masslike wall  thickening (series 2/image 107). Decompressed state of the colon does  limit evaluation. Appendectomy. Patent mesenteric vasculature.  No  pneumatosis, portal venous gas or free intraperitoneal air.  Hysterectomy. Remaining solid organs and bowel are normal. No abdominal  pelvic lymphadenopathy. No focal osseous abnormality.             Impression:      1. Moderate colitis involving the descending colon and to a lesser  extent, the transverse colon.  2. More isolated short segment distal sigmoid colon suspected masslike  wall thickening. This needs a follow-up colonoscopy to exclude an  underlying lesion. Decompressed state of the colon does limit  evaluation.  3. Sigmoid colon diverticulosis.     This report was finalized on 4/9/2025 8:12 PM by Dr. Freddie Basurto M.D on Workstation: [id number]               I have reviewed the medications.     Discharge Medications        ASK your doctor about these medications        Instructions Start Date   amLODIPine 5 MG tablet  Commonly known as: NORVASC   5 mg, Oral, Daily      benazepril 40 MG tablet  Commonly known as: LOTENSIN   40 mg, Oral, Daily      esomeprazole 40 MG capsule  Commonly known as: nexIUM   40 mg, Oral, Every Morning Before Breakfast      hyoscyamine 0.125 MG tablet  Commonly known as: ANASPAZ,LEVSIN   0.125 mg, Oral, Every 6 Hours PRN      travoprost (MARLI free) 0.004 % solution ophthalmic solution  Commonly known as: TRAVATAN   0.004 drops, Daily      trimethoprim 100 MG tablet  Commonly known as: TRIMPEX   100 mg, Oral, Daily              ---------------------------------------------------------------------------------------------  Assessment & Plan   Assessment/Problem List    Colitis    BRBPR (bright red blood per rectum)    Diarrhea      Plan:    Colitis  -CT Scan of A/P reported moderate colitis involving the descending colon and to a lesser extent, the transverse colon. Distal sigmoid colon suspected masslike wall thickening with recommended follow-up colonoscopy to exclude an underlying lesion  -Trend labs  -IV fluids for hydration  -IV Zosyn for treatment of colitis  -C.  difficile panel pending  -GI following  -Colonoscopy a.m. of 4/11/2025     Glaucoma  Continue eye drops     VTE Prophylaxis:  Mechanical VTE prophylaxis orders are present.    Disposition: Pending    Follow-up after Discharge: Pending    This note will serve as a progress note    Jose J Anton III, PA 04/10/25 15:44 EDT    I have worn appropriate PPE during this patient encounter, sanitized my hands both with entering and exiting patient's room.      50 minutes has been spent by [facility] providers in the care of this patient while under observation status on this date 04/10/25

## 2025-04-10 NOTE — PLAN OF CARE
Goal Outcome Evaluation:              Outcome Evaluation: Patient was admitted due to abdominal pain. CT AP showed colitis. Antibiotic started as ordered. Pain medicine IV and oral given. Prefers Oral pain medicine than IV. On clear liquid diet. GI consult in am.

## 2025-04-10 NOTE — CASE MANAGEMENT/SOCIAL WORK
Discharge Planning Assessment  [facility]     Patient Name: Jeanine Guzman  MRN: [id number]  Today's Date: 4/10/2025    Admit Date: 4/9/2025    Plan: Home with spouse   Discharge Needs Assessment       Row Name 04/10/25 1351       Living Environment    People in Home spouse    Current Living Arrangements home    Potentially Unsafe Housing Conditions none    Primary Care Provided by self    Provides Primary Care For no one    Family Caregiver if Needed spouse    Quality of Family Relationships involved;helpful;supportive    Able to Return to Prior Arrangements yes       Resource/Environmental Concerns    Resource/Environmental Concerns none    Transportation Concerns none       Transition Planning    Patient/Family Anticipates Transition to home with family    Patient/Family Anticipated Services at Transition none    Transportation Anticipated family or friend will provide       Discharge Needs Assessment    Readmission Within the Last 30 Days no previous admission in last 30 days    Equipment Currently Used at Home none    Concerns to be Addressed no discharge needs identified;denies needs/concerns at this time    Anticipated Changes Related to Illness none    Equipment Needed After Discharge none                   Discharge Plan       Row Name 04/10/25 1352       Plan    Plan Home with spouse    Plan Comments CCP met with patient at bedside. CCP role explained and discharge planning discussed. Face sheet verified and BERMEO noted. Patient's PCP is Dr. Patel. Patient lives with her , with two steps to the entrance of her home. Patient's bedroom and bathroom are on the main level. Patient is independent with her ADLs and does not use DME. Patient has not used home health/SNF. Patient plans to return home at d/c and does not anticipate any discharge needs. CCP will follow for any needs to arise. Samanta WALSH                  Selected Continued Care - Episodes Includes continued care and service providers with  selected services from the active episodes listed below             Demographic Summary       Row Name 04/10/25 0439       General Information    Admission Type observation    Arrived From emergency department    Required Notices Provided Observation Status Notice    Referral Source admission list    Reason for Consult discharge planning    Preferred Language English                   Functional Status       Row Name 04/10/25 3614       Functional Status    Usual Activity Tolerance good    Current Activity Tolerance good       Functional Status, IADL    Medications independent    Meal Preparation independent    Housekeeping independent    Laundry independent    Shopping independent       Mental Status    General Appearance WDL WDL       Mental Status Summary    Recent Changes in Mental Status/Cognitive Functioning no changes                   Psychosocial    No documentation.                  Abuse/Neglect    No documentation.                  Legal    No documentation.                  Substance Abuse    No documentation.                  Patient Forms    No documentation.                     NICO Vick

## 2025-04-10 NOTE — CONSULTS
Gastroenterology   Initial Inpatient Consult Note    Referring Provider: Shirley Clinton APRN     Reason for Consultation: Rectal bleeding; colitis    Subjective     Thank you for the opportunity to participate in this patient's consultation and care.    History of present illness:    The patient is a 69-year-old female with a medical history of hypertension, glaucoma, GERD, diverticulosis, tubular adenoma colon polyps.  Gastrointestinal service was asked to consult on this patient for rectal bleeding and diarrhea.    - For the past 7 days, she has experienced generalized abdominal cramps followed by intermittent diarrhea several times a day, with mixed blood in her stool.  Last night her symptoms worsened significantly, resulting in profuse rectal bleeding without any straining or constipation issues prior to this episode.  Abdominal discomfort is characterized as cramping, occurring intermittently without any known provoking factors.      - She does not take blood thinners, frequently use NSAIDs, or have a history of alcohol use.  Denies family history of colon cancer.  Her last colonoscopy was in 2022, which revealed benign polyps and diverticulosis.      Results Reviewed:  4/10/25  WBC 15.27 I 15.50  Hemoglobin 13.1 I 13.0    4/9/2025    AST 22, ALT 20  TB 0.9  GI panel PCR negative  C. Difficile - pending  Lipase normal at 13      CT Abdomen Pelvis With Contrast (04/09/2025 18:32)   1. Moderate colitis involving the descending colon and to a lesser extent, the transverse colon.  2. More isolated short segment distal sigmoid colon suspected masslike  wall thickening. This needs a follow-up colonoscopy to exclude an  underlying lesion. Decompressed state of the colon does limit  evaluation.  3. Sigmoid colon diverticulosis.    SCANNED - COLONOSCOPY (11/11/2022)   - Due to risks descending colon  - Diverticulosis  - 7 mm descending colon polyp, resected  - Pathology shows distal descending colon tubular  adenoma polyp  - Surveillance colonoscopy in 7 years is recommended (2029)      Past Medical History:  Past Medical History:   Diagnosis Date    Allergic     Alergic to Morphine & latex    Anxiety     Colon polyp 11/11/2022    Diverticulitis of colon     Diverticulosis 2010    GERD (gastroesophageal reflux disease) 2016    Glaucoma 2018    Headache     Hyperlipidemia October 2022    Hypertension     Irritable bowel syndrome 2017     Past Surgical History:  Past Surgical History:   Procedure Laterality Date    APPENDECTOMY  1958    COLONOSCOPY  11/11/22    EYE SURGERY  2019    FRACTURE SURGERY  2010    Broken wrist    HYSTERECTOMY      WRIST SURGERY Left 2010      Social History:   Social History     Tobacco Use    Smoking status: Never     Passive exposure: Never    Smokeless tobacco: Never   Substance Use Topics    Alcohol use: No      Family History:  Family History   Problem Relation Age of Onset    Heart disease Mother     Cancer Father     Hyperlipidemia Sister        Home Meds:  Medications Prior to Admission   Medication Sig Dispense Refill Last Dose/Taking    amLODIPine (NORVASC) 5 MG tablet Take 1 tablet by mouth Daily. 30 tablet 0 4/9/2025 Morning    benazepril (LOTENSIN) 40 MG tablet Take 1 tablet by mouth Daily. 30 tablet 0 4/9/2025 Morning    esomeprazole (nexIUM) 40 MG capsule Take 1 capsule by mouth Every Morning Before Breakfast. 90 capsule 1 Taking    hyoscyamine (ANASPAZ,LEVSIN) 0.125 MG tablet Take 1 tablet by mouth Every 6 (Six) Hours As Needed for Diarrhea. 90 tablet 0 4/9/2025    travoprost, BAK free, (TRAVATAN) 0.004 % solution ophthalmic solution Administer 0.004 drops to both eyes Daily.   Taking    trimethoprim (TRIMPEX) 100 MG tablet TAKE 1 TABLET BY MOUTH DAILY. 30 tablet 6      Current Meds:   amLODIPine, 5 mg, Oral, Daily  latanoprost, 1 drop, Both Eyes, Nightly  lisinopril, 40 mg, Oral, Q24H  piperacillin-tazobactam, 3.375 g, Intravenous, Q8H  sodium chloride, 10 mL, Intravenous,  Q12H      Allergies:  Allergies   Allergen Reactions    Latex Other (See Comments) and Hives     Serious rash    Morphine Unknown (See Comments) and Other (See Comments)     shaking    Sulfa Antibiotics Rash         Review of Systems  Review of Systems   Constitutional:  Negative for appetite change, chills, fatigue and fever.   Cardiovascular:  Negative for chest pain and palpitations.   Gastrointestinal:  Positive for blood in stool and diarrhea. Negative for abdominal distention, abdominal pain, constipation, nausea, rectal pain and vomiting.   Skin:  Negative for color change and rash.       Objective     Vital Signs  Temp:  [97.6 °F (36.4 °C)-99.3 °F (37.4 °C)] 98.4 °F (36.9 °C)  Heart Rate:  [] 78  Resp:  [16-18] 18  BP: (125-154)/(56-75) 153/59      Physical Exam  Vitals and nursing note reviewed.   Constitutional:       General: She is not in acute distress.     Appearance: Normal appearance. She is normal weight. She is not ill-appearing, toxic-appearing or diaphoretic.   Eyes:      General: No scleral icterus.     Conjunctiva/sclera: Conjunctivae normal.   Pulmonary:      Effort: Pulmonary effort is normal.   Abdominal:      General: Abdomen is flat. Bowel sounds are normal. There is no distension.      Palpations: Abdomen is soft. There is no mass.      Tenderness: There is abdominal tenderness. There is no right CVA tenderness, left CVA tenderness, guarding or rebound.      Hernia: No hernia is present.   Skin:     Coloration: Skin is not jaundiced or pale.      Findings: No erythema or rash.   Neurological:      Mental Status: She is alert and oriented to person, place, and time. Mental status is at baseline.   Psychiatric:         Mood and Affect: Mood normal.         Thought Content: Thought content normal.         Judgment: Judgment normal.            Results Review:     Results from last 7 days   Lab Units 04/10/25  0430 04/09/25  1710   WBC 10*3/mm3 15.27* 15.50*   HEMOGLOBIN g/dL 13.1 13.0    HEMATOCRIT % 38.9 38.7   PLATELETS 10*3/mm3 379 407     Results from last 7 days   Lab Units 04/10/25  0430 04/09/25  1710   SODIUM mmol/L 140 139   POTASSIUM mmol/L 3.7 3.7   CHLORIDE mmol/L 105 102   CO2 mmol/L 24.5 25.4   BUN mg/dL 20 26*   CREATININE mg/dL 0.69 0.79   CALCIUM mg/dL 9.2 9.4   BILIRUBIN mg/dL  --  0.9   ALK PHOS U/L  --  130*   ALT (SGPT) U/L  --  20   AST (SGOT) U/L  --  22   GLUCOSE mg/dL 102* 108*         Lab Results   Lab Value Date/Time    LIPASE 13 04/09/2025 1710    LIPASE 28.00 12/08/2015 0024       Radiology:  CT Abdomen Pelvis With Contrast   Final Result   1. Moderate colitis involving the descending colon and to a lesser   extent, the transverse colon.   2. More isolated short segment distal sigmoid colon suspected masslike   wall thickening. This needs a follow-up colonoscopy to exclude an   underlying lesion. Decompressed state of the colon does limit   evaluation.   3. Sigmoid colon diverticulosis.       This report was finalized on 4/9/2025 8:12 PM by Dr. Freddie Basurto M.D on Workstation: [id number]                Assessment & Plan   Active Hospital Problems    Diagnosis     **Colitis        Assessment &plan:  Hematochezia  Rectal bleeding  Diarrhea  Generalized abdominal cramp  Abnormal CTAP, 4/9: Moderate colitis involving the descending colon, transverse colon; distal sigmoid colon suspected masslike wall thickening  Sigmoid diverticulosis without evidence of acute diverticulitis  Leukocytosis, likely reactive to the above   - GI panel negative.   - C. difficile pending result   - Recommend colonoscopy for further evaluation.  Patient is agreeable.  Bowel prep started this evening in anticipation for colonoscopy in the morning, 4/11/25   - Continue IV fluid for hydration  - Continue antibiotic  - Dicyclomine prn for cramping relief.    I discussed the patients findings and my recommendations with patient and nursing staff. Gastroenterology team is available for concerns or  questions.             Carole Pa) OSORIO Martinez, APRN  Dual Certified Family & Adult-Kofi Acute Care Nurse Practitioner  [facility] [place] [address]  Office: [phone]

## 2025-04-10 NOTE — PROGRESS NOTES
MD ATTESTATION NOTE      Brief HPI: Patient is feeling better this morning.  She has not had any further diarrhea.  She still complains of some mild abdominal cramping.    PHYSICAL EXAM    GENERAL: Awake, alert, and oriented x 3.  Resting comfortably in no acute distress  HENT: nares patent  EYES: no scleral icterus  CV: regular rhythm, normal rate  RESPIRATORY: normal effort, CTAB  ABDOMEN: soft, nondistended, there is mild generalized tenderness without rebound or guarding  MUSCULOSKELETAL: no deformity, extremities are nontender  NEURO: moves all extremities, follows commands, speech is clear and fluent, no facial droop  PSYCH:  calm, cooperative  SKIN: warm, dry    Vital signs and nursing notes reviewed.        Plan: GI panel is negative.  Blood cultures and C. difficile are pending  CT showed moderate colitis as well as a short segment of distal sigmoid colon with suspected masslike wall thickening.  Patient will need a follow-up colonoscopy (this was discussed with the patient).  GI consult is pending

## 2025-04-10 NOTE — H&P
[facility]   HISTORY AND PHYSICAL    Patient Name: Jeanine Guzman  : 1955  MRN: [id number]  Primary Care Physician:  Kori Patel DO  Date of admission: 2025    Subjective   Subjective     Chief Complaint:   Chief Complaint   Patient presents with    Abdominal Pain         HPI:    Jeanine Guzman is a 69 y.o. female with a past medical history significant for GERD, glaucoma, and hypertension who presented to the emergency department with a complaint of abdominal pain and rectal bleeding and was admitted to the ED observation unit with a diagnosis of colitis.    Patient states she began having severe abdominal pain, vomiting, diarrhea, and bright red blood with stools today. States the pain is more epigastric discomfort. Patient states she has had intermittent rectal bleeding for more than 6 months. States the episodes of bleeding are accompanied by abdominal pain, but not as severe as the pain she had today.     ED workup: WBC count 15.50 with a negative lactate. Creatinine 0.79, BUN 26, Alk Phos 130. Lipase 13. Urinalysis reported 1+ Ketones, Moderate Leukocytes, TNTC WBC's, 3+ Bacteria, 7-12 Squamous Epithelial Cells, Trace Blood and 11-20 RBC's. CT Scan of A/P reported moderate colitis involving the descending colon and to a lesser extent, the transverse colon. Distal sigmoid colon suspected masslike wall thickening with recommended follow-up colonoscopy to exclude an underlying lesion. Blood cultures pending. GI PCR Panel is pending. IVF's and IV Zosyn given. Analgesic medication for pain control.    On admission to the ED observation unit, patient is alert and oriented x 4.  States she did receive pain relief with medication in the ED.  States she has had no further episodes of vomiting or bloody stools since arrival to the ED.  Vital signs stable.    Review of Systems   All systems were reviewed and negative except for: As documented in HPI.    Personal History     Past Medical History:    Diagnosis Date    Allergic     Alergic to Morphine & latex    Anxiety     Colon polyp 11/11/2022    Diverticulitis of colon     Diverticulosis 2010    GERD (gastroesophageal reflux disease) 2016    Glaucoma 2018    Headache     Hyperlipidemia October 2022    Hypertension     Irritable bowel syndrome 2017       Past Surgical History:   Procedure Laterality Date    APPENDECTOMY  1958    COLONOSCOPY  11/11/22    EYE SURGERY  2019    FRACTURE SURGERY  2010    Broken wrist    HYSTERECTOMY      WRIST SURGERY Left 2010       Family History: family history includes Cancer in her father; Heart disease in her mother; Hyperlipidemia in her sister. Otherwise pertinent FHx was reviewed and not pertinent to current issue.    Social History:  reports that she has never smoked. She has never been exposed to tobacco smoke. She has never used smokeless tobacco. She reports that she does not drink alcohol and does not use drugs.    Home Medications:  amLODIPine, benazepril, esomeprazole, hyoscyamine, travoprost (MARLI free), and trimethoprim    Allergies:  Allergies   Allergen Reactions    Latex Other (See Comments) and Hives     Serious rash    Morphine Unknown (See Comments) and Other (See Comments)     shaking    Sulfa Antibiotics Rash       Objective   Objective     Vitals:   Temp:  [97.8 °F (36.6 °C)] 97.8 °F (36.6 °C)  Heart Rate:  [] 90  Resp:  [18] 18  BP: (133-154)/(65-75) 133/66  Physical Exam    Constitutional: Awake, alert   Eyes: PERRLA, sclerae anicteric, no conjunctival injection   HENT: NCAT, mucous membranes moist   Neck: Supple, no thyromegaly, no lymphadenopathy, trachea midline   Respiratory: Clear to auscultation bilaterally, nonlabored respirations    Cardiovascular: RRR  Gastrointestinal: Positive bowel sounds, soft, generalized tenderness with palpation, nondistended   Musculoskeletal: No bilateral ankle edema, no clubbing or cyanosis to extremities   Psychiatric: Appropriate affect,  cooperative  Neurologic: Oriented x 3, strength symmetric in all extremities, Cranial Nerves grossly intact to confrontation, speech clear   Skin: No rashes     Result Review    Result Review:  I have personally reviewed the results from the time of this admission to 4/9/2025 22:03 EDT and agree with these findings:  [x]  Laboratory list / accordion  []  Microbiology  [x]  Radiology  []  EKG/Telemetry   []  Cardiology/Vascular   []  Pathology  []  Old records  []  Other:  Most notable findings include: WBC count 15.50 with a negative lactate. Creatinine 0.79, BUN 26, Alk Phos 130. Lipase 13. Urinalysis reported 1+ Ketones, Moderate Leukocytes, TNTC WBC's, 3+ Bacteria, 7-12 Squamous Epithelial Cells, Trace Blood and 11-20 RBC's. CT Scan of A/P reported moderate colitis involving the descending colon and to a lesser extent, the transverse colon. Distal sigmoid colon suspected masslike wall thickening with recommended follow-up colonoscopy to exclude an underlying lesion.      Assessment & Plan   The 10-year ASCVD risk score (Arash DK, et al., 2019) is: 13.3%    Values used to calculate the score:      Age: 69 years      Sex: Female      Is Non- : No      Diabetic: No      Tobacco smoker: No      Systolic Blood Pressure: 133 mmHg      Is BP treated: Yes      HDL Cholesterol: 50 mg/dL      Total Cholesterol: 243 mg/dL    Assessment / Plan     Brief Patient Summary:  Jeanine Guzman is a 69 y.o. female who was admitted to the ED observation unit for further evaluation and workup with a complaint of abdominal pain, vomiting, diarrhea, and bloody stools and the diagnosis of colitis.  Patient was made aware of CT findings that reported distal sigmoid colon suspected masslike wall thickening with recommended follow-up colonoscopy to exclude an underlying lesion analgesic medication and antiemetics for symptom control.  IV fluids for hydration and IV Zosyn for treatment of colitis.  GI panel is  negative.  GI consult in AM.    Active Hospital Problems:  Active Hospital Problems    Diagnosis     **Colitis      Plan:       Colitis  -CT Scan of A/P reported moderate colitis involving the descending colon and to a lesser extent, the transverse colon. Distal sigmoid colon suspected masslike wall thickening with recommended follow-up colonoscopy to exclude an underlying lesion  -Trend labs  -IV fluids for hydration  -IV Zosyn for treatment of colitis  -GI panel is negative  -Clear liquid diet  -GI consult in a.m.    Glaucoma  Continue eye drops    VTE Prophylaxis:  Mechanical VTE prophylaxis orders are present.    CODE STATUS:    Code Status (Patient has no pulse and is not breathing): CPR (Attempt to Resuscitate)  Medical Interventions (Patient has pulse or is breathing): Full Support  Level Of Support Discussed With: Patient    Admission Status:  I believe this patient meets observation status.    Electronically signed by FREDDIE Plunkett, 04/09/25, 10:03 PM EDT.        75 minutes has been spent by [facility] providers in the care of this patient while under observation status on this date 04/09/25        I have worn appropriate PPE during this patient encounter, sanitized my hands both with entering and exiting patient's room.

## 2025-04-11 ENCOUNTER — ANESTHESIA (OUTPATIENT)
Dept: GASTROENTEROLOGY | Facility: HOSPITAL | Age: 70
End: 2025-04-11
Payer: MEDICARE

## 2025-04-11 ENCOUNTER — ANESTHESIA EVENT (OUTPATIENT)
Dept: GASTROENTEROLOGY | Facility: HOSPITAL | Age: 70
End: 2025-04-11
Payer: MEDICARE

## 2025-04-11 LAB
ALBUMIN SERPL-MCNC: 4.1 G/DL (ref 3.5–5.2)
ALBUMIN/GLOB SERPL: 1.4 G/DL
ALP SERPL-CCNC: 96 U/L (ref 39–117)
ALT SERPL W P-5'-P-CCNC: 19 U/L (ref 1–33)
ANION GAP SERPL CALCULATED.3IONS-SCNC: 13.7 MMOL/L (ref 5–15)
AST SERPL-CCNC: 23 U/L (ref 1–32)
BASOPHILS # BLD AUTO: 0.03 10*3/MM3 (ref 0–0.2)
BASOPHILS NFR BLD AUTO: 0.2 % (ref 0–1.5)
BILIRUB SERPL-MCNC: 1.8 MG/DL (ref 0–1.2)
BUN SERPL-MCNC: 15 MG/DL (ref 8–23)
BUN/CREAT SERPL: 18.1 (ref 7–25)
C DIFF GDH + TOXINS A+B STL QL IA.RAPID: NEGATIVE
C DIFF TOX GENS STL QL NAA+PROBE: POSITIVE
CALCIUM SPEC-SCNC: 9.2 MG/DL (ref 8.6–10.5)
CHLORIDE SERPL-SCNC: 102 MMOL/L (ref 98–107)
CO2 SERPL-SCNC: 21.3 MMOL/L (ref 22–29)
CREAT SERPL-MCNC: 0.83 MG/DL (ref 0.57–1)
DEPRECATED RDW RBC AUTO: 38.6 FL (ref 37–54)
EGFRCR SERPLBLD CKD-EPI 2021: 76.4 ML/MIN/1.73
EOSINOPHIL # BLD AUTO: 0.46 10*3/MM3 (ref 0–0.4)
EOSINOPHIL NFR BLD AUTO: 3.4 % (ref 0.3–6.2)
ERYTHROCYTE [DISTWIDTH] IN BLOOD BY AUTOMATED COUNT: 12.9 % (ref 12.3–15.4)
GLOBULIN UR ELPH-MCNC: 2.9 GM/DL
GLUCOSE SERPL-MCNC: 95 MG/DL (ref 65–99)
HCT VFR BLD AUTO: 37.4 % (ref 34–46.6)
HGB BLD-MCNC: 12.8 G/DL (ref 12–15.9)
IMM GRANULOCYTES # BLD AUTO: 0.05 10*3/MM3 (ref 0–0.05)
IMM GRANULOCYTES NFR BLD AUTO: 0.4 % (ref 0–0.5)
LYMPHOCYTES # BLD AUTO: 2.03 10*3/MM3 (ref 0.7–3.1)
LYMPHOCYTES NFR BLD AUTO: 15.2 % (ref 19.6–45.3)
MCH RBC QN AUTO: 28.1 PG (ref 26.6–33)
MCHC RBC AUTO-ENTMCNC: 34.2 G/DL (ref 31.5–35.7)
MCV RBC AUTO: 82.2 FL (ref 79–97)
MONOCYTES # BLD AUTO: 0.88 10*3/MM3 (ref 0.1–0.9)
MONOCYTES NFR BLD AUTO: 6.6 % (ref 5–12)
NEUTROPHILS NFR BLD AUTO: 74.2 % (ref 42.7–76)
NEUTROPHILS NFR BLD AUTO: 9.94 10*3/MM3 (ref 1.7–7)
NRBC BLD AUTO-RTO: 0 /100 WBC (ref 0–0.2)
PLATELET # BLD AUTO: 431 10*3/MM3 (ref 140–450)
PMV BLD AUTO: 8.8 FL (ref 6–12)
POTASSIUM SERPL-SCNC: 3.9 MMOL/L (ref 3.5–5.2)
PROT SERPL-MCNC: 7 G/DL (ref 6–8.5)
RBC # BLD AUTO: 4.55 10*6/MM3 (ref 3.77–5.28)
SODIUM SERPL-SCNC: 137 MMOL/L (ref 136–145)
WBC NRBC COR # BLD AUTO: 13.39 10*3/MM3 (ref 3.4–10.8)

## 2025-04-11 PROCEDURE — 87449 NOS EACH ORGANISM AG IA: CPT | Performed by: PHYSICIAN ASSISTANT

## 2025-04-11 PROCEDURE — 25010000002 ONDANSETRON PER 1 MG: Performed by: NURSE PRACTITIONER

## 2025-04-11 PROCEDURE — 45381 COLONOSCOPY SUBMUCOUS NJX: CPT | Performed by: INTERNAL MEDICINE

## 2025-04-11 PROCEDURE — 80053 COMPREHEN METABOLIC PANEL: CPT | Performed by: NURSE PRACTITIONER

## 2025-04-11 PROCEDURE — 0DBM8ZX EXCISION OF DESCENDING COLON, VIA NATURAL OR ARTIFICIAL OPENING ENDOSCOPIC, DIAGNOSTIC: ICD-10-PCS | Performed by: INTERNAL MEDICINE

## 2025-04-11 PROCEDURE — 0DBQ8ZX EXCISION OF ANUS, VIA NATURAL OR ARTIFICIAL OPENING ENDOSCOPIC, DIAGNOSTIC: ICD-10-PCS | Performed by: INTERNAL MEDICINE

## 2025-04-11 PROCEDURE — 25010000002 HYDROMORPHONE PER 4 MG: Performed by: INTERNAL MEDICINE

## 2025-04-11 PROCEDURE — 85025 COMPLETE CBC W/AUTO DIFF WBC: CPT | Performed by: NURSE PRACTITIONER

## 2025-04-11 PROCEDURE — 0DBL8ZX EXCISION OF TRANSVERSE COLON, VIA NATURAL OR ARTIFICIAL OPENING ENDOSCOPIC, DIAGNOSTIC: ICD-10-PCS | Performed by: INTERNAL MEDICINE

## 2025-04-11 PROCEDURE — 25010000002 PIPERACILLIN SOD-TAZOBACTAM PER 1 G: Performed by: NURSE PRACTITIONER

## 2025-04-11 PROCEDURE — 45385 COLONOSCOPY W/LESION REMOVAL: CPT | Performed by: INTERNAL MEDICINE

## 2025-04-11 PROCEDURE — 25810000003 LACTATED RINGERS PER 1000 ML: Performed by: NURSE ANESTHETIST, CERTIFIED REGISTERED

## 2025-04-11 PROCEDURE — 25010000002 LIDOCAINE 2% SOLUTION: Performed by: NURSE ANESTHETIST, CERTIFIED REGISTERED

## 2025-04-11 PROCEDURE — 25010000002 PROPOFOL 10 MG/ML EMULSION: Performed by: NURSE ANESTHETIST, CERTIFIED REGISTERED

## 2025-04-11 PROCEDURE — 88341 IMHCHEM/IMCYTCHM EA ADD ANTB: CPT | Performed by: INTERNAL MEDICINE

## 2025-04-11 PROCEDURE — 25010000002 PHENYLEPHRINE 10 MG/ML SOLUTION: Performed by: NURSE ANESTHETIST, CERTIFIED REGISTERED

## 2025-04-11 PROCEDURE — 25010000002 SODIUM CHLORIDE 0.9 % WITH KCL 20 MEQ 20-0.9 MEQ/L-% SOLUTION: Performed by: NURSE PRACTITIONER

## 2025-04-11 PROCEDURE — 99222 1ST HOSP IP/OBS MODERATE 55: CPT | Performed by: PHYSICIAN ASSISTANT

## 2025-04-11 PROCEDURE — 25010000002 PIPERACILLIN SOD-TAZOBACTAM PER 1 G: Performed by: INTERNAL MEDICINE

## 2025-04-11 PROCEDURE — 87493 C DIFF AMPLIFIED PROBE: CPT | Performed by: PHYSICIAN ASSISTANT

## 2025-04-11 PROCEDURE — 88305 TISSUE EXAM BY PATHOLOGIST: CPT | Performed by: INTERNAL MEDICINE

## 2025-04-11 PROCEDURE — 88342 IMHCHEM/IMCYTCHM 1ST ANTB: CPT | Performed by: INTERNAL MEDICINE

## 2025-04-11 PROCEDURE — 45380 COLONOSCOPY AND BIOPSY: CPT | Performed by: INTERNAL MEDICINE

## 2025-04-11 PROCEDURE — 0DBN8ZX EXCISION OF SIGMOID COLON, VIA NATURAL OR ARTIFICIAL OPENING ENDOSCOPIC, DIAGNOSTIC: ICD-10-PCS | Performed by: INTERNAL MEDICINE

## 2025-04-11 RX ORDER — PROPOFOL 10 MG/ML
VIAL (ML) INTRAVENOUS AS NEEDED
Status: DISCONTINUED | OUTPATIENT
Start: 2025-04-11 | End: 2025-04-11 | Stop reason: SURG

## 2025-04-11 RX ORDER — VANCOMYCIN HYDROCHLORIDE 125 MG/1
125 CAPSULE ORAL EVERY 6 HOURS SCHEDULED
Status: DISCONTINUED | OUTPATIENT
Start: 2025-04-12 | End: 2025-04-12

## 2025-04-11 RX ORDER — SODIUM CHLORIDE 9 MG/ML
30 INJECTION, SOLUTION INTRAVENOUS CONTINUOUS PRN
Status: ACTIVE | OUTPATIENT
Start: 2025-04-11 | End: 2025-04-11

## 2025-04-11 RX ORDER — PHENYLEPHRINE HYDROCHLORIDE 10 MG/ML
INJECTION INTRAVENOUS AS NEEDED
Status: DISCONTINUED | OUTPATIENT
Start: 2025-04-11 | End: 2025-04-11 | Stop reason: SURG

## 2025-04-11 RX ORDER — SODIUM CHLORIDE, SODIUM LACTATE, POTASSIUM CHLORIDE, CALCIUM CHLORIDE 600; 310; 30; 20 MG/100ML; MG/100ML; MG/100ML; MG/100ML
INJECTION, SOLUTION INTRAVENOUS CONTINUOUS PRN
Status: DISCONTINUED | OUTPATIENT
Start: 2025-04-11 | End: 2025-04-11 | Stop reason: SURG

## 2025-04-11 RX ORDER — LIDOCAINE HYDROCHLORIDE 20 MG/ML
INJECTION, SOLUTION INFILTRATION; PERINEURAL AS NEEDED
Status: DISCONTINUED | OUTPATIENT
Start: 2025-04-11 | End: 2025-04-11 | Stop reason: SURG

## 2025-04-11 RX ADMIN — PROPOFOL 200 MCG/KG/MIN: 10 INJECTION, EMULSION INTRAVENOUS at 09:48

## 2025-04-11 RX ADMIN — HYDROCODONE BITARTRATE AND ACETAMINOPHEN 1 TABLET: 5; 325 TABLET ORAL at 17:56

## 2025-04-11 RX ADMIN — LATANOPROST 1 DROP: 50 SOLUTION/ DROPS OPHTHALMIC at 23:01

## 2025-04-11 RX ADMIN — ONDANSETRON 4 MG: 2 INJECTION, SOLUTION INTRAMUSCULAR; INTRAVENOUS at 07:21

## 2025-04-11 RX ADMIN — AMLODIPINE BESYLATE 5 MG: 5 TABLET ORAL at 12:11

## 2025-04-11 RX ADMIN — LIDOCAINE HYDROCHLORIDE 40 MG: 20 INJECTION, SOLUTION INFILTRATION; PERINEURAL at 09:48

## 2025-04-11 RX ADMIN — PIPERACILLIN AND TAZOBACTAM 3.38 G: 3; .375 INJECTION, POWDER, FOR SOLUTION INTRAVENOUS at 12:12

## 2025-04-11 RX ADMIN — SODIUM CHLORIDE AND POTASSIUM CHLORIDE 75 ML/HR: 150; 900 INJECTION, SOLUTION INTRAVENOUS at 05:32

## 2025-04-11 RX ADMIN — PIPERACILLIN AND TAZOBACTAM 3.38 G: 3; .375 INJECTION, POWDER, FOR SOLUTION INTRAVENOUS at 20:23

## 2025-04-11 RX ADMIN — PHENYLEPHRINE HYDROCHLORIDE 100 MCG: 10 INJECTION INTRAVENOUS at 10:06

## 2025-04-11 RX ADMIN — POLYETHYLENE GLYCOL 3350 0.5 BOTTLE: 17 POWDER, FOR SOLUTION ORAL at 05:25

## 2025-04-11 RX ADMIN — HYDROCODONE BITARTRATE AND ACETAMINOPHEN 1 TABLET: 5; 325 TABLET ORAL at 23:01

## 2025-04-11 RX ADMIN — HYDROMORPHONE HYDROCHLORIDE 0.5 MG: 1 INJECTION, SOLUTION INTRAMUSCULAR; INTRAVENOUS; SUBCUTANEOUS at 20:23

## 2025-04-11 RX ADMIN — SODIUM CHLORIDE, POTASSIUM CHLORIDE, SODIUM LACTATE AND CALCIUM CHLORIDE: 600; 310; 30; 20 INJECTION, SOLUTION INTRAVENOUS at 09:43

## 2025-04-11 RX ADMIN — VANCOMYCIN HYDROCHLORIDE 125 MG: 125 CAPSULE ORAL at 23:03

## 2025-04-11 RX ADMIN — LISINOPRIL 40 MG: 20 TABLET ORAL at 12:11

## 2025-04-11 RX ADMIN — PROPOFOL 100 MG: 10 INJECTION, EMULSION INTRAVENOUS at 09:47

## 2025-04-11 RX ADMIN — PHENYLEPHRINE HYDROCHLORIDE 50 MCG: 10 INJECTION INTRAVENOUS at 10:03

## 2025-04-11 RX ADMIN — Medication 10 ML: at 20:23

## 2025-04-11 RX ADMIN — HYDROCODONE BITARTRATE AND ACETAMINOPHEN 1 TABLET: 5; 325 TABLET ORAL at 12:11

## 2025-04-11 NOTE — PLAN OF CARE
Goal Outcome Evaluation:  Plan of Care Reviewed With: patient   Vss, ambulating independently, voiding per brp, plans to d/c home when medically stable, prn meds given for abdominal pain, multiple episodes of diarrhea per pt report     Progress: improving

## 2025-04-11 NOTE — PLAN OF CARE
Goal Outcome Evaluation:              Outcome Evaluation: Patient is alert and oriented times 4, on room air and independent. She was admitted due to colitis. Coloscopy ordered tomorrow, bowel started. Last BM brownish output with blood. IV fluid NS with KCL infusing as ordered. Cramping abdominal pain inter

## 2025-04-11 NOTE — ANESTHESIA PREPROCEDURE EVALUATION
Anesthesia Evaluation     NPO Solid Status: > 8 hours             Airway   Mallampati: II  Dental      Pulmonary    (-) sleep apnea, not a smoker  Cardiovascular     (+) hypertension      Neuro/Psych  GI/Hepatic/Renal/Endo    (+) GERD    Musculoskeletal     Abdominal    Substance History      OB/GYN          Other                    Anesthesia Plan    ASA 3     MAC       Anesthetic plan, risks, benefits, and alternatives have been provided, discussed and informed consent has been obtained with: patient.    CODE STATUS:    Code Status (Patient has no pulse and is not breathing): CPR (Attempt to Resuscitate)  Medical Interventions (Patient has pulse or is breathing): Full Support  Level Of Support Discussed With: Patient

## 2025-04-11 NOTE — PLAN OF CARE
Goal Outcome Evaluation:              Outcome Evaluation: Patient is alert and oriented times 4, on room air and independent. She was admitted due to colitis. Coloscopy ordered tomorrow, bowel started. Last BM brownish output with blood. IV fluid NS with KCL infusing as ordered. Cramping abdominal pain intermittent, still with diarrhea with blood. IV antibiotic IV given. C. Diff positive referred to APRN.

## 2025-04-11 NOTE — CONSULTS
Jeanine Guzman is a 69 y.o. female who is seen as a consult at the request of Thierno Simeon MD for bleeding mass.      HPI:  The patient presented to the emergency department 2 days ago with abdominal pain, vomiting, diarrhea, and bright red blood per rectum.    Colonoscopy performed by Dr. Horner today noted a likely malignant tumor at 18 cm proximal to the anus.  Biopsy pending.    Afebrile.  Heart rate in the 70s and 80s.  Blood pressure ranging from 110s to 140s over 50s to 70s in the last 24 hours.  Hemoglobin within normal limits.  C. difficile toxin positive.  Zosyn has been initiated.    Reporting left flank pain.  Denies abdominal pain.    Past Medical History:   Diagnosis Date    Allergic     Alergic to Morphine & latex    Anxiety     Colon polyp 11/11/2022    Diverticulitis of colon     Diverticulosis 2010    GERD (gastroesophageal reflux disease) 2016    Glaucoma 2018    Headache     Hyperlipidemia October 2022    Hypertension     Irritable bowel syndrome 2017       Past Surgical History:   Procedure Laterality Date    APPENDECTOMY  1958    COLONOSCOPY  11/11/22    EYE SURGERY  2019    FRACTURE SURGERY  2010    Broken wrist    HYSTERECTOMY      WRIST SURGERY Left 2010       Social History:   reports that she has never smoked. She has never been exposed to tobacco smoke. She has never used smokeless tobacco. She reports that she does not drink alcohol and does not use drugs.    Marriage status:     Family History   Problem Relation Age of Onset    Heart disease Mother     Cancer Father     Hyperlipidemia Sister        No current facility-administered medications on file prior to encounter.     Current Outpatient Medications on File Prior to Encounter   Medication Sig Dispense Refill    amLODIPine (NORVASC) 5 MG tablet Take 1 tablet by mouth Daily. 30 tablet 0    benazepril (LOTENSIN) 40 MG tablet Take 1 tablet by mouth Daily. 30 tablet 0    esomeprazole (nexIUM) 40 MG capsule Take 1  capsule by mouth Every Morning Before Breakfast. 90 capsule 1    hyoscyamine (ANASPAZ,LEVSIN) 0.125 MG tablet Take 1 tablet by mouth Every 6 (Six) Hours As Needed for Diarrhea. 90 tablet 0    travoprost, BAK free, (TRAVATAN) 0.004 % solution ophthalmic solution Administer 0.004 drops to both eyes Daily.      trimethoprim (TRIMPEX) 100 MG tablet TAKE 1 TABLET BY MOUTH DAILY. 30 tablet 6       Current Facility-Administered Medications:     amLODIPine (NORVASC) tablet 5 mg, 5 mg, Oral, Daily, Shirley Clinton APRN, 5 mg at 04/11/25 1211    dicyclomine (BENTYL) capsule 20 mg, 20 mg, Oral, TID PRN, Tucker Martinez, APRN    HYDROcodone-acetaminophen (NORCO) 5-325 MG per tablet 1 tablet, 1 tablet, Oral, Q4H PRN, Shirley Clinton APRN, 1 tablet at 04/11/25 1211    HYDROmorphone (DILAUDID) injection 0.5 mg, 0.5 mg, Intravenous, Q2H PRN, Shirley Clinton APRN, 0.5 mg at 04/10/25 0327    latanoprost (XALATAN) 0.005 % ophthalmic solution 1 drop, 1 drop, Both Eyes, Nightly, Shirley Clniton APRN, 1 drop at 04/10/25 2112    lisinopril (PRINIVIL,ZESTRIL) tablet 40 mg, 40 mg, Oral, Q24H, Shirley Clinton APRN, 40 mg at 04/11/25 1211    ondansetron ODT (ZOFRAN-ODT) disintegrating tablet 4 mg, 4 mg, Oral, Q6H PRN **OR** ondansetron (ZOFRAN) injection 4 mg, 4 mg, Intravenous, Q6H PRN, Shirley Clinton APRN, 4 mg at 04/11/25 0721    piperacillin-tazobactam (ZOSYN) 3.375 g IVPB in 100 mL NS MBP (CD), 3.375 g, Intravenous, Q8H, Shirley Clinton APRN, 3.375 g at 04/11/25 1212    [COMPLETED] Insert Peripheral IV, , , Once **AND** sodium chloride 0.9 % flush 10 mL, 10 mL, Intravenous, PRN, Thierno Simeon MD    sodium chloride 0.9 % flush 10 mL, 10 mL, Intravenous, Q12H, Shirley Clinton, FREDDIE, 10 mL at 04/10/25 0930    sodium chloride 0.9 % flush 10 mL, 10 mL, Intravenous, PRN, Shirley Clinton, APRN    sodium chloride 0.9 % infusion 40 mL, 40 mL, Intravenous, PRN, Shirley Clinton, APRN    sodium chloride 0.9 % with KCl 20 mEq/L  infusion, 75 mL/hr, Intravenous, Continuous, Shirley Clinton, APRN, Last Rate: 75 mL/hr at 04/11/25 0532, 75 mL/hr at 04/11/25 0532    Allergy  Latex, Morphine, and Sulfa antibiotics    Vitals:    04/11/25 1054   BP: 141/70   Pulse: 73   Resp: 18   Temp: 97.6 °F (36.4 °C)   SpO2: 98%   -  Body mass index is 26.16 kg/m².    Physical Exam  Sitting up in hospital bed, no acute distress  Abdomen soft, nontender    Review of Medical Record:  I reviewed records as noted in HPI    Assessment:  Rectal mass    Plan:  Will need CT chest eventually for cancer staging, as well as oncology consult.  Discussed with Dr. Rowe, who discussed with Dr. Horner, suspicion for ischemic colitis rather than C. difficile colitis.  Continue Zosyn for now.  No surgery at this time due to potential infection.  Will continue to follow.

## 2025-04-11 NOTE — ANESTHESIA POSTPROCEDURE EVALUATION
"Patient: Jeanine Guzman    Procedure Summary       Date: 04/11/25 [facility]    Anesthesia Start: 0943 Anesthesia Stop: 1030    Procedure: COLONOSCOPY TO CECUM WITH BIOPSY AND SPOT INK MARKER; POLYPECTOMY ( COLD SNARE) Diagnosis:       Colitis      BRBPR (bright red blood per rectum)      Diarrhea, unspecified type      (Colitis [K52.9])      (BRBPR (bright red blood per rectum) [K62.5])      (Diarrhea, unspecified type [R19.7])    Surgeons: Juan Jose Horner MD Provider: Travis Strickland MD    Anesthesia Type: MAC ASA Status: 3            Anesthesia Type: MAC    Vitals  Vitals Value Taken Time   BP 94/51 04/11/25 10:37   Temp     Pulse 66 04/11/25 10:38   Resp 16 04/11/25 10:27   SpO2 94 % 04/11/25 10:38   Vitals shown include unfiled device data.        Post Anesthesia Care and Evaluation    Pain management: adequate    Airway patency: patent  Anesthetic complications: No anesthetic complications    Cardiovascular status: acceptable  Respiratory status: acceptable  Hydration status: acceptable    Comments: BP (!) 86/50 (BP Location: Left arm, Patient Position: Lying)   Pulse 73   Temp 36.7 °C (98.1 °F) (Oral)   Resp 16   Ht 162.6 cm (64\")   Wt 69.1 kg (152 lb 6.4 oz)   SpO2 93%   BMI 26.16 kg/m²       "

## 2025-04-11 NOTE — PROGRESS NOTES
ED OBSERVATION PROGRESS/DISCHARGE SUMMARY    Date of Admission: 4/9/2025   LOS: 0 days   PCP: Kori Patel DO    Final Diagnosis presumed infectious colitis, rectal mass      Subjective     Hospital Outcome:      Jeanine Guzman is a 69 y.o. female who was admitted to the ED observation unit with abdominal pain and rectal bleeding.  CT scan showed a moderate colitis involving the descending and transverse colon.  Distal sigmoid colon there was a suspected masslike wall thickening colonoscopy follow-up was recommended.  WBC was 15.5, lactate negative, creatinine 0.79.  Blood cultures pending.  GI panel was negative.  C. difficile toxin study pending.  Patient currently being treated with IV Zosyn and analgesics/antiemetics as needed.    3:42 PM. Patient has been evaluated by GI. There is a plan for colonoscopy in the morning for 11/2025. To continue with IV hydration and antibiotics. Dicyclomine as needed for cramping relief. Patient resting quietly at this time in no acute distress. VSS, /51, HR 74, SpO2 98%, RR 16     4/11/25:  C-Diff Toxin is positive; waiting on Antigen result. Vital signs stable. Afebrile. NPO after midnight in planning for colonoscopy today. WBC count improved to 13.39. Bilirubin 1.8.  We are continuing IV hydration and antibiotics.  Vital signs remain stable. Afebrile.    1:39 PM.  Patient back from colonoscopy.  Patient found to have a mass proximal to the rectum suspicious for malignancy that was biopsied.  There was also a polyp biopsied.  Colitis also noted on exam.  Patient returns to ED opts for continued management.  She continues to complain of pain.  She continues to have slow intermittent bleeding.  Hemoglobin stable.  Vital signs stable.  Colorectal surgery consulted to help formulate a plan of care.  Patient admitted to Dr. Uribe with A for continued IV fluids, IV antibiotics, pain control, medical management    ROS:  General: no fevers, chills  Respiratory: no  cough, dyspnea  Cardiovascular: no chest pain, palpitations  Abdomen: Abdominal pain, rectal bleeding  Neurologic: No focal weakness    Objective   Physical Exam:  I have reviewed the vital signs.  Temp:  [97.6 °F (36.4 °C)-98.2 °F (36.8 °C)] 97.6 °F (36.4 °C)  Heart Rate:  [66-87] 73  Resp:  [16-18] 18  BP: ()/(50-74) 141/70  General Appearance:    Alert, cooperative, no distress  Head:    Normocephalic, atraumatic  Eyes:    Sclerae anicteric  Neck:   Supple, no mass  Lungs: Clear to auscultation bilaterally, respirations unlabored  Heart: Regular rate and rhythm, S1 and S2 normal, no murmur, rub or gallop  Abdomen:  Soft, nontender, bowel sounds active, nondistended  Extremities: No clubbing, cyanosis, or edema to lower extremities  Pulses:  2+ and symmetric in distal lower extremities  Skin: No rashes   Neurologic: Oriented x3, Normal strength to extremities    Results Review:    I have reviewed the labs, radiology results and diagnostic studies.    Results from last 7 days   Lab Units 04/11/25  0616   WBC 10*3/mm3 13.39*   HEMOGLOBIN g/dL 12.8   HEMATOCRIT % 37.4   PLATELETS 10*3/mm3 431     Results from last 7 days   Lab Units 04/11/25  0524 04/10/25  0430 04/09/25  1710   SODIUM mmol/L 137 140 139   POTASSIUM mmol/L 3.9 3.7 3.7   CHLORIDE mmol/L 102 105 102   CO2 mmol/L 21.3* 24.5 25.4   BUN mg/dL 15 20 26*   CREATININE mg/dL 0.83 0.69 0.79   CALCIUM mg/dL 9.2 9.2 9.4   BILIRUBIN mg/dL 1.8*  --  0.9   ALK PHOS U/L 96  --  130*   ALT (SGPT) U/L 19  --  20   AST (SGOT) U/L 23  --  22   GLUCOSE mg/dL 95 102* 108*     Imaging Results (Last 24 Hours)       ** No results found for the last 24 hours. **            I have reviewed the medications.     Discharge Medications        ASK your doctor about these medications        Instructions Start Date   amLODIPine 5 MG tablet  Commonly known as: NORVASC   5 mg, Oral, Daily      benazepril 40 MG tablet  Commonly known as: LOTENSIN   40 mg, Oral, Daily       esomeprazole 40 MG capsule  Commonly known as: nexIUM   40 mg, Oral, Every Morning Before Breakfast      hyoscyamine 0.125 MG tablet  Commonly known as: ANASPAZ,LEVSIN   0.125 mg, Oral, Every 6 Hours PRN      travoprost (MARLI free) 0.004 % solution ophthalmic solution  Commonly known as: TRAVATAN   0.004 drops, Daily      trimethoprim 100 MG tablet  Commonly known as: TRIMPEX   100 mg, Oral, Daily              ---------------------------------------------------------------------------------------------  Assessment & Plan   Assessment/Problem List    Colitis    BRBPR (bright red blood per rectum)    Diarrhea      Plan:    Colitis  -CT Scan of A/P reported moderate colitis involving the descending colon and to a lesser extent, the transverse colon. Distal sigmoid colon suspected masslike wall thickening with recommended follow-up colonoscopy to exclude an underlying lesion  -Trend labs  -IV fluids for hydration  -IV Zosyn for treatment of colitis  -C. difficile panel +Toxin; waiting on Antigen result  -GI following with the plan above     Glaucoma  Continue eye drops    Disposition: Admission    Follow-up after Discharge: Pending    This note will serve as a progress note    Jose J Anton III, PA 04/11/25 13:51 EDT    I have worn appropriate PPE during this patient encounter, sanitized my hands both with entering and exiting patient's room.      50 minutes has been spent by [facility] providers in the care of this patient while under observation status on this date 04/11/25

## 2025-04-12 ENCOUNTER — APPOINTMENT (OUTPATIENT)
Dept: GENERAL RADIOLOGY | Facility: HOSPITAL | Age: 70
End: 2025-04-12
Payer: MEDICARE

## 2025-04-12 LAB
BASOPHILS # BLD AUTO: 0.01 10*3/MM3 (ref 0–0.2)
BASOPHILS NFR BLD AUTO: 0.1 % (ref 0–1.5)
DEPRECATED RDW RBC AUTO: 41.7 FL (ref 37–54)
EOSINOPHIL # BLD AUTO: 0.24 10*3/MM3 (ref 0–0.4)
EOSINOPHIL NFR BLD AUTO: 1.9 % (ref 0.3–6.2)
ERYTHROCYTE [DISTWIDTH] IN BLOOD BY AUTOMATED COUNT: 13.3 % (ref 12.3–15.4)
GEN 5 1HR TROPONIN T REFLEX: 6 NG/L
GLUCOSE BLDC GLUCOMTR-MCNC: 110 MG/DL (ref 70–130)
HCT VFR BLD AUTO: 36.8 % (ref 34–46.6)
HGB BLD-MCNC: 11.8 G/DL (ref 12–15.9)
IMM GRANULOCYTES # BLD AUTO: 0.02 10*3/MM3 (ref 0–0.05)
IMM GRANULOCYTES NFR BLD AUTO: 0.2 % (ref 0–0.5)
LYMPHOCYTES # BLD AUTO: 1.67 10*3/MM3 (ref 0.7–3.1)
LYMPHOCYTES NFR BLD AUTO: 13.3 % (ref 19.6–45.3)
MCH RBC QN AUTO: 27.6 PG (ref 26.6–33)
MCHC RBC AUTO-ENTMCNC: 32.1 G/DL (ref 31.5–35.7)
MCV RBC AUTO: 86 FL (ref 79–97)
MONOCYTES # BLD AUTO: 1.07 10*3/MM3 (ref 0.1–0.9)
MONOCYTES NFR BLD AUTO: 8.5 % (ref 5–12)
NEUTROPHILS NFR BLD AUTO: 76 % (ref 42.7–76)
NEUTROPHILS NFR BLD AUTO: 9.52 10*3/MM3 (ref 1.7–7)
NRBC BLD AUTO-RTO: 0 /100 WBC (ref 0–0.2)
PLATELET # BLD AUTO: 370 10*3/MM3 (ref 140–450)
PMV BLD AUTO: 9 FL (ref 6–12)
RBC # BLD AUTO: 4.28 10*6/MM3 (ref 3.77–5.28)
TROPONIN T NUMERIC DELTA: -2 NG/L
TROPONIN T SERPL HS-MCNC: 8 NG/L
WBC NRBC COR # BLD AUTO: 12.53 10*3/MM3 (ref 3.4–10.8)

## 2025-04-12 PROCEDURE — 99222 1ST HOSP IP/OBS MODERATE 55: CPT | Performed by: INTERNAL MEDICINE

## 2025-04-12 PROCEDURE — 93005 ELECTROCARDIOGRAM TRACING: CPT | Performed by: STUDENT IN AN ORGANIZED HEALTH CARE EDUCATION/TRAINING PROGRAM

## 2025-04-12 PROCEDURE — 71045 X-RAY EXAM CHEST 1 VIEW: CPT

## 2025-04-12 PROCEDURE — 85025 COMPLETE CBC W/AUTO DIFF WBC: CPT | Performed by: PHYSICIAN ASSISTANT

## 2025-04-12 PROCEDURE — 25010000002 PIPERACILLIN SOD-TAZOBACTAM PER 1 G: Performed by: STUDENT IN AN ORGANIZED HEALTH CARE EDUCATION/TRAINING PROGRAM

## 2025-04-12 PROCEDURE — 25010000002 PIPERACILLIN SOD-TAZOBACTAM PER 1 G: Performed by: INTERNAL MEDICINE

## 2025-04-12 PROCEDURE — 99232 SBSQ HOSP IP/OBS MODERATE 35: CPT | Performed by: PHYSICIAN ASSISTANT

## 2025-04-12 PROCEDURE — 84484 ASSAY OF TROPONIN QUANT: CPT | Performed by: STUDENT IN AN ORGANIZED HEALTH CARE EDUCATION/TRAINING PROGRAM

## 2025-04-12 PROCEDURE — 93010 ELECTROCARDIOGRAM REPORT: CPT | Performed by: INTERNAL MEDICINE

## 2025-04-12 PROCEDURE — 82948 REAGENT STRIP/BLOOD GLUCOSE: CPT

## 2025-04-12 RX ORDER — ALUMINA, MAGNESIA, AND SIMETHICONE 2400; 2400; 240 MG/30ML; MG/30ML; MG/30ML
15 SUSPENSION ORAL ONCE
Status: COMPLETED | OUTPATIENT
Start: 2025-04-12 | End: 2025-04-12

## 2025-04-12 RX ORDER — FAMOTIDINE 20 MG/1
20 TABLET, FILM COATED ORAL
Status: DISCONTINUED | OUTPATIENT
Start: 2025-04-12 | End: 2025-04-16 | Stop reason: HOSPADM

## 2025-04-12 RX ORDER — NITROGLYCERIN 0.4 MG/1
0.4 TABLET SUBLINGUAL
Status: DISCONTINUED | OUTPATIENT
Start: 2025-04-12 | End: 2025-04-16 | Stop reason: HOSPADM

## 2025-04-12 RX ADMIN — PIPERACILLIN AND TAZOBACTAM 3.38 G: 3; .375 INJECTION, POWDER, FOR SOLUTION INTRAVENOUS at 12:22

## 2025-04-12 RX ADMIN — Medication 10 ML: at 20:08

## 2025-04-12 RX ADMIN — AMLODIPINE BESYLATE 5 MG: 5 TABLET ORAL at 09:10

## 2025-04-12 RX ADMIN — ALUMINUM HYDROXIDE, MAGNESIUM HYDROXIDE, AND DIMETHICONE 15 ML: 400; 400; 40 SUSPENSION ORAL at 10:52

## 2025-04-12 RX ADMIN — PIPERACILLIN AND TAZOBACTAM 3.38 G: 3; .375 INJECTION, POWDER, FOR SOLUTION INTRAVENOUS at 03:36

## 2025-04-12 RX ADMIN — LATANOPROST 1 DROP: 50 SOLUTION/ DROPS OPHTHALMIC at 23:29

## 2025-04-12 RX ADMIN — FAMOTIDINE 20 MG: 20 TABLET, FILM COATED ORAL at 17:43

## 2025-04-12 RX ADMIN — LISINOPRIL 40 MG: 20 TABLET ORAL at 09:10

## 2025-04-12 RX ADMIN — PIPERACILLIN AND TAZOBACTAM 3.38 G: 3; .375 INJECTION, POWDER, FOR SOLUTION INTRAVENOUS at 20:07

## 2025-04-12 RX ADMIN — VANCOMYCIN HYDROCHLORIDE 125 MG: 125 CAPSULE ORAL at 05:21

## 2025-04-12 RX ADMIN — HYDROCODONE BITARTRATE AND ACETAMINOPHEN 1 TABLET: 5; 325 TABLET ORAL at 03:36

## 2025-04-12 NOTE — PROGRESS NOTES
[facility] Clinical Pharmacy Services: C. Difficile Medication Changes       Pharmacy has been consulted to look over Jeanine Guzman's profile to check patient's medications for changes due to C. Difficile diagnosis per Dr. Uribe' request.       Current C. Diff Regimen:   Vancomycin 1250 mg PO q6h x40 doses to begin now    Assessment/Plan/Changes       1. Proton pump inhibitor: NONE    2. Antiperistalic agents or stool softeners/laxatives: NONE       Thank you for allowing me to participate in your patient's care.  Please call pharmacy with any questions or concerns.     Rodrigo Peterson, [facility]  Clinical Pharmacist

## 2025-04-12 NOTE — CONSULTS
Patient Name: Jeanine Guzman  :1955  69 y.o.    Date of Admission: 2025  Encounter Provider: Jeni Cifuentes MD  Date of Encounter Visit: 25  Place of Service: [facility] CARDIOLOGY  Referring Provider: Thierno Simeon MD  Patient Care Team:  Kori Patel DO as PCP - General (Family Medicine)  Sukhi Land, RN as Registered Nurse  Kori Narayanan RN as Ambulatory  ([facility])      Chief complaint: Chest pain       History of Present Illness:    Jeanine Guzman is a 69-year-old female with a past medical history significant for GERD, glaucoma, and hypertension.      Has been evaluated by [facility] for chest discomfort.  She had a negative nuclear stress test in  and .  She has chronic palpitations with an essentially normal 24-hour Holter monitor.  Echocardiogram in  was also normal.  Most recent stress test was in 2021 and it was negative for myocardial ischemia or infarction.    She presented to the emergency department on 2025 complaining of abdominal pain and rectal bleeding.  She was ultimately admitted to the observation unit with a diagnosis of colitis.  Patient underwent a full anoscopy yesterday which noted a likely malignant tumor at 18 cm proximal to the anus.  Biopsies were sent.  Colorectal surgery and hematology/oncology following.    A rapid response was called earlier today after the patient complained of midsternal chest pain.  It radiated to her bilateral shoulders, back and jaw.  She received a GI cocktail with improvement in her symptoms.  Reports feeling anxious when her symptoms began. She has similar episodes at home with anxiety.  She states she takes an aspirin and feels better.  Current EKG shows no ischemic changes.  High-sensitivity troponin 8.      Stress Test 2021  Summary  Normal pharmacological stress SPECT Myocardial Perfusion Imaging.  No evidence of  stress induced myocardial ischemia or infarction.  Summary of LV Function  Normal LV systolic function.     Holter 11/2016  IMPRESSION: Unremarkable Holter. This study demonstrates sinus rhythm   without ventricular ectopy. There were rare to occasional supraventricular   ectopics which were predominantly isolated with rare atrial couplets and   one 4-beat run of SVT as described.   There were no complex or sustained dysrhythmias noted.     Past Medical History:   Diagnosis Date    Allergic     Alergic to Morphine & latex    Anxiety     Colon polyp 11/11/2022    Diverticulitis of colon     Diverticulosis 2010    GERD (gastroesophageal reflux disease) 2016    Glaucoma 2018    Headache     Hyperlipidemia October 2022    Hypertension     Irritable bowel syndrome 2017       Past Surgical History:   Procedure Laterality Date    APPENDECTOMY  1958    COLONOSCOPY  11/11/22    EYE SURGERY  2019    FRACTURE SURGERY  2010    Broken wrist    HYSTERECTOMY      WRIST SURGERY Left 2010         Prior to Admission medications    Medication Sig Start Date End Date Taking? Authorizing Provider   amLODIPine (NORVASC) 5 MG tablet Take 1 tablet by mouth Daily. 4/8/25  Yes Kori Patel DO   benazepril (LOTENSIN) 40 MG tablet Take 1 tablet by mouth Daily. 4/8/25  Yes Kori Patel DO   esomeprazole (nexIUM) 40 MG capsule Take 1 capsule by mouth Every Morning Before Breakfast. 10/1/24  Yes Kori Patel DO   hyoscyamine (ANASPAZ,LEVSIN) 0.125 MG tablet Take 1 tablet by mouth Every 6 (Six) Hours As Needed for Diarrhea. 7/10/24  Yes Kori Patel DO   travoprost, BAK free, (TRAVATAN) 0.004 % solution ophthalmic solution Administer 0.004 drops to both eyes Daily. 10/7/22  Yes Provider, MD Dexter   trimethoprim (TRIMPEX) 100 MG tablet TAKE 1 TABLET BY MOUTH DAILY. 1/9/24   Kori Patel DO       Allergies   Allergen Reactions    Latex Other (See Comments) and Hives     Serious rash    Morphine Unknown (See Comments)  and Other (See Comments)     shaking    Sulfa Antibiotics Rash       Social History     Socioeconomic History    Marital status:      Spouse name: Bret    Number of children: 0   Tobacco Use    Smoking status: Never     Passive exposure: Never    Smokeless tobacco: Never   Vaping Use    Vaping status: Never Used   Substance and Sexual Activity    Alcohol use: No    Drug use: Never    Sexual activity: Not Currently     Partners: Male     Comment: Not applicable       Family History   Problem Relation Age of Onset    Heart disease Mother     Cancer Father     Hyperlipidemia Sister        REVIEW OF SYSTEMS:   All other systems reviewed and negative.        Objective:     Vitals:    04/12/25 0410 04/12/25 0715 04/12/25 1037 04/12/25 1219   BP: 125/62 139/68 137/62    BP Location: Left arm Left arm Left arm    Patient Position: Lying Lying Lying    Pulse: 75 93 84    Resp: 16 16 16 16   Temp: 98.4 °F (36.9 °C) 98.3 °F (36.8 °C) 99.1 °F (37.3 °C)    TempSrc: Oral Oral Oral Oral   SpO2: 94% 94% 99%    Weight:       Height:         Body mass index is 26.16 kg/m².    Intake/Output Summary (Last 24 hours) at 4/12/2025 1358  Last data filed at 4/12/2025 1219  Gross per 24 hour   Intake 1200 ml   Output --   Net 1200 ml       General: Alert, no acute distress  Neck: No JVD  Cardiac: regular rate and rhythm. No murmurs.   Pulmonary: normal rate. No wheezes, crackles or rales. Clear to auscultation  Extremities: No edema.    Lab Review:     Results from last 7 days   Lab Units 04/11/25  0524   SODIUM mmol/L 137   POTASSIUM mmol/L 3.9   CHLORIDE mmol/L 102   CO2 mmol/L 21.3*   BUN mg/dL 15   CREATININE mg/dL 0.83   CALCIUM mg/dL 9.2   BILIRUBIN mg/dL 1.8*   ALK PHOS U/L 96   ALT (SGPT) U/L 19   AST (SGOT) U/L 23   GLUCOSE mg/dL 95     Results from last 7 days   Lab Units 04/12/25  1055   HSTROP T ng/L 8     Results from last 7 days   Lab Units 04/12/25  1102   WBC 10*3/mm3 12.53*   HEMOGLOBIN g/dL 11.8*   HEMATOCRIT %  36.8   PLATELETS 10*3/mm3 370                 I personally viewed and interpreted the patient's EKG/Telemetry data.        Assessment and Plan:       1.  Chest pain.  EKG does not show ischemic changes.  First troponin normal.  Second troponin is pending.  CT chest is pending.  I recommend checking a  Lexiscan nuclear stress test tomorrow.  No caffeine today.  N.p.o. after midnight.  2.  Ischemic colitis.  3.  Possible colon cancer.  Pathology pending.  4.  Hypertension.  Amlodipine 5 mg a day, lisinopril 40 mg a day.  5.  GERD    Jeni Cifuentes MD  04/12/25  13:58 EDT

## 2025-04-12 NOTE — PROGRESS NOTES
Name: Jeanine Guzman ADMIT: 2025   : 1955  PCP: Kori Patel DO    MRN: [id number] LOS: 1 days   AGE/SEX: 69 y.o. female  ROOM: [facility]     Subjective   Subjective     Called urgently to bedside for a rapid response as the patient was having chest discomfort. She appears calm and comfortable on exam. She reports that she was talking to her sister on the phone who gets really anxious and it makes her anxious and sometimes she has chest pain afterwards. This happens to her frequently at home. She denies any cardiac history, though it does appear that she has been evaluated in the remote past for chest pain by [place] [facility] with a negative stress test. She describes central chest heaviness that radiates to her right jaw and back. She states that when she has this at home, she takes an aspirin and her symptoms improve shortly afterwards. She also states that sitting up frequently helps. She is unsure if exertion affects her pain. EKG performed at bedside shows some mild t wave flattening/inversion in lead 3. No prior ekgs are available for review.    She also reports that her diarrhea has slowed significantly.        Objective   Objective   Vital Signs  Temp:  [97.3 °F (36.3 °C)-99.1 °F (37.3 °C)] 99.1 °F (37.3 °C)  Heart Rate:  [75-94] 84  Resp:  [16-18] 16  BP: (118-139)/(61-81) 137/62  SpO2:  [93 %-99 %] 99 %  on   ;   Device (Oxygen Therapy): room air  Body mass index is 26.16 kg/m².  Physical Exam  Constitutional:       General: She is not in acute distress.     Appearance: She is not toxic-appearing.   Cardiovascular:      Rate and Rhythm: Normal rate and regular rhythm.      Heart sounds: Normal heart sounds.   Pulmonary:      Effort: Pulmonary effort is normal.      Breath sounds: Normal breath sounds.   Abdominal:      General: Bowel sounds are normal. There is no distension.      Palpations: Abdomen is soft.      Tenderness: There is no abdominal tenderness. There is no guarding or  rebound.   Musculoskeletal:         General: No tenderness.      Right lower leg: No edema.      Left lower leg: No edema.   Neurological:      Mental Status: She is alert.   Psychiatric:         Mood and Affect: Mood normal.         Behavior: Behavior normal.         Results Review     I reviewed the patient's new clinical results.  Results from last 7 days   Lab Units 04/11/25  0616 04/10/25  0430 04/09/25  1710   WBC 10*3/mm3 13.39* 15.27* 15.50*   HEMOGLOBIN g/dL 12.8 13.1 13.0   PLATELETS 10*3/mm3 431 379 407     Results from last 7 days   Lab Units 04/11/25  0524 04/10/25  0430 04/09/25  1710   SODIUM mmol/L 137 140 139   POTASSIUM mmol/L 3.9 3.7 3.7   CHLORIDE mmol/L 102 105 102   CO2 mmol/L 21.3* 24.5 25.4   BUN mg/dL 15 20 26*   CREATININE mg/dL 0.83 0.69 0.79   GLUCOSE mg/dL 95 102* 108*   Estimated Creatinine Clearance: 61.1 mL/min (by C-G formula based on SCr of 0.83 mg/dL).  Results from last 7 days   Lab Units 04/11/25  0524 04/09/25  1710   ALBUMIN g/dL 4.1 4.7   BILIRUBIN mg/dL 1.8* 0.9   ALK PHOS U/L 96 130*   AST (SGOT) U/L 23 22   ALT (SGPT) U/L 19 20     Results from last 7 days   Lab Units 04/11/25  0524 04/10/25  0430 04/09/25  1710   CALCIUM mg/dL 9.2 9.2 9.4   ALBUMIN g/dL 4.1  --  4.7     Results from last 7 days   Lab Units 04/09/25  1940   LACTATE mmol/L 1.1     SARS-CoV-2, DAVIE   Date Value Ref Range Status   10/31/2024 NEGATIVE Negative Final   01/20/2022 Positive (A) Negative Final     Comment:     The 2019-CoV rRT-PCR Assay is only for use under a Food and Drug Administration Emergency Use Authorization. The performance characteristics of the assay were verified by the Clinical Microbiology Laboratory at the [facility].   Results should be used in conjunction with the patient's clinical symptoms, medical history, and other clinical/laboratory findings to determine an overall clinical diagnosis. Negative results do not preclude infection with SARS-CoV-2  (COVID-19).    Test parameters have not been validated for screening asymptomatic patients.     Glucose   Date/Time Value Ref Range Status   04/12/2025 1036 110 70 - 130 mg/dL Final       CT Abdomen Pelvis With Contrast  Narrative: CT ABDOMEN AND PELVIS WITH IV CONTRAST     HISTORY: Abdominal pain, vomiting, diarrhea and bright red blood per  rectum.     TECHNIQUE: Radiation dose reduction techniques were utilized, including  automated exposure control and exposure modulation based on body size.   3 mm images were obtained through the abdomen and pelvis after the  administration of IV contrast.     COMPARISON: None available        FINDINGS: Left renal cyst. Right hepatic cyst. Subcentimeter hypodense  left hepatic lesion too small to characterize. Confluent moderate  circumferential wall thickening extending from the distal transverse  colon throughout the descending colon (series 2/image 71). More mild  circumferential wall thickening throughout the remaining more proximal  transverse colon. Prominence of the associated vasa recti with minimal  pericolonic inflammatory changes. Sigmoid colon diverticulosis. More  isolated short segment distal sigmoid colon suspected masslike wall  thickening (series 2/image 107). Decompressed state of the colon does  limit evaluation. Appendectomy. Patent mesenteric vasculature. No  pneumatosis, portal venous gas or free intraperitoneal air.  Hysterectomy. Remaining solid organs and bowel are normal. No abdominal  pelvic lymphadenopathy. No focal osseous abnormality.           Impression: 1. Moderate colitis involving the descending colon and to a lesser  extent, the transverse colon.  2. More isolated short segment distal sigmoid colon suspected masslike  wall thickening. This needs a follow-up colonoscopy to exclude an  underlying lesion. Decompressed state of the colon does limit  evaluation.  3. Sigmoid colon diverticulosis.     This report was finalized on 4/9/2025 8:12 PM by  Dr. Freddie Basurto M.D on Workstation: [id number]       Scheduled Medications  amLODIPine, 5 mg, Oral, Daily  latanoprost, 1 drop, Both Eyes, Nightly  lisinopril, 40 mg, Oral, Q24H  piperacillin-tazobactam, 3.375 g, Intravenous, Q8H  sodium chloride, 10 mL, Intravenous, Q12H    Infusions  Pharmacy Consult,     Diet  Diet: Liquid; Clear Liquid; Fluid Consistency: Thin (IDDSI 0)       Assessment/Plan     Active Hospital Problems    Diagnosis  POA    **Colitis [K52.9]  Yes    BRBPR (bright red blood per rectum) [K62.5]  Unknown    Diarrhea [R19.7]  Unknown      Resolved Hospital Problems   No resolved problems to display.       69 y.o. female admitted with Colitis.    Ischemic colitis-continue IV zosyn for translation prophylaxis  Positive C diff toxin PCR but negative antigen-this likely represents colonization. Her colonoscopy photos are more consistent with ischemic colitis and her diarrhea has slowed, so I will stop the oral vancomycin  Incidentally discovered rectal mass-pathology is pending. If this comes back for cancer, then she'll need CT imaging of her chest and an oncology consult. Colorectal surgery wishes to delay any surgical procedure until these tests are done and she's recovered from her colitis  Chest pain-EKG with lead III t wave flatting/mild inversion. Check troponin and EKG. Try a GI cocktail. Check an echocardiogram. Ask cardiology to see.  Essential hypertension-well controlled  GERD-famotidine  SCDs for DVT prophylaxis.  Full code.  Discussed with patient, nursing staff, and consulting provider.  Anticipate discharge home timing yet to be determined.      Naga Mattson MD  [facility]  04/12/25  10:56 EDT

## 2025-04-12 NOTE — PROGRESS NOTES
"DAILY PROGRESS NOTE  [facility]    Patient Identification:  Name: Jeanine Guzman  Age: 69 y.o.  Sex: female  :  1955  MRN: [id number]         Primary Care Physician: Kori Patel DO Subjective Pleasant 69-year-old female admitted on 2025.  She states at that morning she developed fairly sudden onset of bilateral lower abdominal severe cramping pain.  This was followed by bloody diarrhea.  She developed nausea.  Vomited once which was undigested food.  She was noting chills through the day.  No recent history of antibiotics.  She presented to the emergency room where she was noted to have leukocytosis with a left shift.  Hemoglobin stable.  Zosyn was initiated for possible colitis.  Colonoscopy was performed which showed inflamed mucosa and some areas with a membranous appearance.  In addition an 18 cm mass was noted 18 cm proximal to the anus.  In reference to the latter the patient does note now that she has seen blood in her stools intermittently \"for quite some time now\".  Stool studies are positive for toxigenic C. difficile by PCR but negative for C. difficile toxigenic antigen.  Her last bowel movement was after the colonoscopy and she states it was bloody.  Still noting lower abdominal cramping pain but more significant pain now in the left lower quadrant.    We have been requested to assume care.    Objective:  General Appearance:  Comfortable, well-appearing, in no acute distress and not in pain.    Vital signs: (most recent): Blood pressure 132/61, pulse 82, temperature 97.8 °F (36.6 °C), temperature source Oral, resp. rate 16, height 162.6 cm (64\"), weight 69.1 kg (152 lb 6.4 oz), SpO2 97%.    Lungs:  Normal effort and normal respiratory rate.  Breath sounds clear to auscultation.    Heart: Normal rate.  Regular rhythm.    Abdomen: Abdomen is soft and non-distended.  Bowel sounds are normal.   There is right lower quadrant and left lower quadrant tenderness. There " is no right upper quadrant or left upper quadrant tenderness.  There is no rebound tenderness.  There is no guarding.     Extremities: There is no dependent edema.    Neurological: Patient is alert and oriented to person, place and time.    Skin:  Warm and dry.                  Vital signs in last 24 hours:  Temp:  [97.3 °F (36.3 °C)-98.1 °F (36.7 °C)] 97.8 °F (36.6 °C)  Heart Rate:  [66-86] 82  Resp:  [16-18] 16  BP: ()/(50-81) 132/61    Intake/Output:    Intake/Output Summary (Last 24 hours) at 4/11/2025 2130  Last data filed at 4/11/2025 1900  Gross per 24 hour   Intake 560 ml   Output --   Net 560 ml         Results from last 7 days   Lab Units 04/11/25  0616 04/10/25  0430 04/09/25  1710   WBC 10*3/mm3 13.39* 15.27* 15.50*   HEMOGLOBIN g/dL 12.8 13.1 13.0   PLATELETS 10*3/mm3 431 379 407     Results from last 7 days   Lab Units 04/11/25  0524 04/10/25  0430 04/09/25  1710   SODIUM mmol/L 137 140 139   POTASSIUM mmol/L 3.9 3.7 3.7   CHLORIDE mmol/L 102 105 102   CO2 mmol/L 21.3* 24.5 25.4   BUN mg/dL 15 20 26*   CREATININE mg/dL 0.83 0.69 0.79   GLUCOSE mg/dL 95 102* 108*   Estimated Creatinine Clearance: 61.1 mL/min (by C-G formula based on SCr of 0.83 mg/dL).  Results from last 7 days   Lab Units 04/11/25  0524 04/10/25  0430 04/09/25  1710   CALCIUM mg/dL 9.2 9.2 9.4   ALBUMIN g/dL 4.1  --  4.7     Results from last 7 days   Lab Units 04/11/25  0524 04/09/25  1710   ALBUMIN g/dL 4.1 4.7   BILIRUBIN mg/dL 1.8* 0.9   ALK PHOS U/L 96 130*   AST (SGOT) U/L 23 22   ALT (SGPT) U/L 19 20       Assessment:  Colitis: Ischemic versus infectious.  Discordant results on C. difficile evaluation.  This pattern is typically seen with C. difficile colonization but does not definitively rule out C. difficile colitis.  Considering her presentation I would favor adding oral vancomycin to the regime.  Await biopsy results.  Colonic mass: Colorectal surgery input appreciated.  Will need to clear up the acute colitis issues  prior to surgery.  Await biopsies.  Hypertension: Presently doing well on home medications.  Monitor.    All problems new to this examiner.    Plan:  Please see above.  Discussed with patient.    Shay Uribe MD  4/11/2025  21:30 EDT

## 2025-04-12 NOTE — NURSING NOTE
"Patient Name:  Jeanine Guzman  YOB: 1955  MRN:  [id number]  Admit Date:  4/9/2025    Visit Diagnoses:     ICD-10-CM ICD-9-CM   1. Colitis  K52.9 558.9   2. BRBPR (bright red blood per rectum)  K62.5 569.3   3. Diarrhea, unspecified type  R19.7 787.91       Reason For Rapid: Acute chest pain at rest. Patient reports mid-sternal to substernal aching and dull pain 9/10 that radiates to bilateral shoulder and back, and right jaw. Reported pain started at rest while speaking with family member and feeling anxious.     RN Communicated With: Dr. Mattson at bedside.       Rapid Outcome: Patient status upgraded to tele. Labs pending for MD review. Patient pain improving after GI cocktail and graded a 5/10 in her back on rapid completion. No acute chest pain present on rapid end.     Communication From Rapid Team: Notify Dr. Mattson if troponin elevated. Inform MD of improvement in pain after GI cocktail.     Most Recent Vital Signs  Temp:  [97.3 °F (36.3 °C)-99.1 °F (37.3 °C)] 99.1 °F (37.3 °C)  Heart Rate:  [75-94] 84  Resp:  [16-18] 16  BP: (118-139)/(61-81) 137/62  SpO2:  [93 %-99 %] 99 %  on   ;   Device (Oxygen Therapy): room air    Labs:      Glucose   Date/Time Value Ref Range Status   04/12/2025 1036 110 70 - 130 mg/dL Final     No results found for: \"SITE\", \"ALLENTEST\", \"PHART\", \"EOS3GUX\", \"PO2ART\", \"GAH5QDZ\", \"BASEEXCESS\", \"K5VLJBUW\", \"HGBBG\", \"HCTABG\", \"OXYHEMOGLOBI\", \"METHHGBN\", \"CARBOXYHGB\", \"CO2CT\", \"BAROMETRIC\", \"MODALITY\", \"FIO2\"  Results from last 7 days   Lab Units 04/11/25  0616 04/10/25  0430 04/09/25  1710   WBC 10*3/mm3 13.39* 15.27* 15.50*   HEMOGLOBIN g/dL 12.8 13.1 13.0   PLATELETS 10*3/mm3 431 379 407     Results from last 7 days   Lab Units 04/11/25  0524 04/10/25  0430 04/09/25  1710   SODIUM mmol/L 137 140 139   POTASSIUM mmol/L 3.9 3.7 3.7   CHLORIDE mmol/L 102 105 102   CO2 mmol/L 21.3* 24.5 25.4   BUN mg/dL 15 20 26*   CREATININE mg/dL 0.83 0.69 0.79   GLUCOSE mg/dL 95 102* 108* " "  ALBUMIN g/dL 4.1  --  4.7   BILIRUBIN mg/dL 1.8*  --  0.9   ALK PHOS U/L 96  --  130*   AST (SGOT) U/L 23  --  22   ALT (SGPT) U/L 19  --  20   Estimated Creatinine Clearance: 61.1 mL/min (by C-G formula based on SCr of 0.83 mg/dL).      Results from last 7 days   Lab Units 04/09/25  1940   LACTATE mmol/L 1.1     No results found for: \"STREPPNEUAG\", \"LEGANTIGENUR\"  Results from last 7 days   Lab Units 04/09/25  2103   BLOODCX  No growth at 2 days     Results from last 7 days   Lab Units 04/09/25  2140   ADENOVIRUS  Not Detected       NIH Stroke Scale:                                                         Please refer to full rapid documentation on summary page under Index / Code Timeline  "

## 2025-04-12 NOTE — PROGRESS NOTES
Progress Note    LOS 3      S: Pt states that she experienced LLQ pain yesterday following her colonoscopy. Once she was able to pass gas, the pain resolved. Tolerating clear liquids. She has a lot of questions in regards to when surgery will occur and if she will need an ostomy.     O:  Temp:  [97.3 °F (36.3 °C)-98.8 °F (37.1 °C)] 98.3 °F (36.8 °C)  Heart Rate:  [66-94] 93  Resp:  [16-18] 16  BP: ()/(50-81) 139/68      Intake/Output Summary (Last 24 hours) at 4/12/2025 1009  Last data filed at 4/12/2025 0853  Gross per 24 hour   Intake 960 ml   Output --   Net 960 ml       Abd: Soft      A/P: 69 y.o. female with a rectal mass    - Colonoscopy performed yesterday by GI revealed a likely malignant tumor at 18 cm proximal to the anus. Pathology pending.   - Pt will need to be evaluated by oncology and will need CT of chest for cancer staging and recover from colitis prior to surgery.   - Colonoscopy findings more consistent with ischemic colitis rather than C.diff colitis. Continue IV Zosyn.

## 2025-04-12 NOTE — PLAN OF CARE
Problem: Adult Inpatient Plan of Care  Goal: Absence of Hospital-Acquired Illness or Injury  Intervention: Identify and Manage Fall Risk  Recent Flowsheet Documentation  Taken 4/12/2025 1800 by Jannie Gallegos RN  Safety Promotion/Fall Prevention: safety round/check completed  Taken 4/12/2025 1539 by Jannie Gallegos RN  Safety Promotion/Fall Prevention: safety round/check completed  Intervention: Prevent Skin Injury  Recent Flowsheet Documentation  Taken 4/12/2025 1800 by Jannie Gallegos RN  Body Position: position changed independently  Taken 4/12/2025 1539 by Jannie Gallegos RN  Body Position: position changed independently  Skin Protection: transparent dressing maintained  Intervention: Prevent and Manage VTE (Venous Thromboembolism) Risk  Recent Flowsheet Documentation  Taken 4/12/2025 1539 by Jannie Gallegos RN  VTE Prevention/Management:   bilateral   SCDs (sequential compression devices) off   patient refused intervention  Goal: Optimal Comfort and Wellbeing  Intervention: Provide Person-Centered Care  Recent Flowsheet Documentation  Taken 4/12/2025 1539 by Jannie Gallegos RN  Trust Relationship/Rapport: care explained   Goal Outcome Evaluation:   Patient transferred from [facility] [place] [place] this shift for a cardiac workup after having chest pain and a rapid response called on 7P; no c/o chest pain since patient arrived to 6N; SR on monitor and patient on room air; plan for stress test tomorrow; awaiting echo and chest CT; cont IV abx per order; pt to be NPO at [facility].

## 2025-04-12 NOTE — PLAN OF CARE
Goal Outcome Evaluation:           Progress: no change         PT complaining of chest pain this am. Rapid called. Provider evaluated, cardiology consult, labs, ECG, and CXRAY done. Plan to transfer pt. Will continue to monitor

## 2025-04-12 NOTE — PLAN OF CARE
Goal Outcome Evaluation:  Plan of Care Reviewed With: patient           Outcome Evaluation: Patient remains stable. Alert and oriented. VSS. Ambulating independently. Voiding per BRP. Pain managed with prn pain meds. IV and PO abx given per order. Clear liquids. Contact spore isolation for C. diff. Plans to d/c home.

## 2025-04-12 NOTE — PROGRESS NOTES
CRS attending  Discussed with the patient that she has a C. difficile antigen but not active infection  Discussed with her that ischemic colitis is involving descending sigmoid and part of the rectum.  Discussed with the patient the tumor in the rectosigmoid that the pathology is pending.  I also discussed with her how we stage colon and rectal cancer.    She has had chest pain and is getting a cardiac workup    I discussed with the patient the plan for her to have antibiotics to help decrease the chance of having bacteremia because of ischemic colitis.  We need the colon to heal before it is ready to have surgery.  If the ischemic colitis progressed, then the surgery she would undergo would be a total abdominal colectomy and partial proctectomy with an end ileostomy.  This would be because there would not be enough colon to salvage because it would only leave the right colon and a small part of the transverse.  If we allow the colon to heal then we can use the descending colon to attach to the remaining rectum and it would be a low anterior resection.  Will need to further stage her with a chest CT.  She really will need another CT of the abdomen and pelvis with IV and oral contrast given the limits of her ER CT scan and the ischemic colitis that was present.

## 2025-04-12 NOTE — PROGRESS NOTES
"Nutrition Services    Patient Name:  Jeainne Guzman  YOB: 1955  MRN: [id number]  Admit Date:  4/9/2025  Assessment Date:  04/12/25    NUTRITION EVALUATION      Reason for Encounter NPO/Clear Liquid Diet Status   Diagnosis/Problem Admission Diagnosis:  Colitis [K52.9]  BRBPR (bright red blood per rectum) [K62.5]    Problem List:    Colitis    BRBPR (bright red blood per rectum)    Diarrhea     Narrative 69 y.o. female admitted with abdominal pain, N/V, rectal bleeding.  Intermittent rectal bleeding x 6 months.  + Cdiff.  Colitis.  S/p colonoscopy - likely malignant tumor, biopsy pending.       PO Diet Diet: Liquid; Clear Liquid; Fluid Consistency: Thin (IDDSI 0)   Allergies NKFA   Supplements n/a   PO Intake % 100% x 1 CLD meal       Chewing/Swallowing Difficulty no issues identified at this time       Medications reviewed   Labs  reviewed       Physical Findings alert, oriented, overweight, room air     Edema no edema    GI Function abdominal pain, non-distended , normoactive, last bowel movement: 4/9   Skin Status skin intact    Lines/Drains none   I/O reviewed        Height  Weight  BMI  Weight Trend     Height: 162.6 cm (64\")  Weight: 69.1 kg (152 lb 6.4 oz) (04/09/25 2228)  Body mass index is 26.16 kg/m².  Stable       NFPE Not indicated at this time       Nutrition Problem (PES) Problem: Inadequate Oral Intake  Etiology: Medical Diagnosis - colitis    Signs/Symptoms: Clear Liquid Diet       Intervention/Plan NPO/CLD x 3 days.  Will follow for diet advancement.    RD to follow up per protocol.     Results from last 7 days   Lab Units 04/11/25  0524 04/10/25  0430 04/09/25  1710   SODIUM mmol/L 137 140 139   POTASSIUM mmol/L 3.9 3.7 3.7   CHLORIDE mmol/L 102 105 102   CO2 mmol/L 21.3* 24.5 25.4   BUN mg/dL 15 20 26*   CREATININE mg/dL 0.83 0.69 0.79   CALCIUM mg/dL 9.2 9.2 9.4   BILIRUBIN mg/dL 1.8*  --  0.9   ALK PHOS U/L 96  --  130*   ALT (SGPT) U/L 19  --  20   AST (SGOT) U/L 23  --  22 "   GLUCOSE mg/dL 95 102* 108*     Results from last 7 days   Lab Units 04/11/25  0616   HEMOGLOBIN g/dL 12.8   HEMATOCRIT % 37.4     Lab Results   Component Value Date    HGBA1C 5.2 02/24/2021     Wt Readings from Last 10 Encounters:   04/09/25 69.1 kg (152 lb 6.4 oz)   08/13/24 71.3 kg (157 lb 3.2 oz)   11/30/23 71.3 kg (157 lb 3.2 oz)   05/24/23 74.8 kg (165 lb)   12/02/22 71.2 kg (157 lb)   12/30/18 76.2 kg (168 lb)       Electronically signed by:  Joselyn Raman RD  04/12/25 09:09 EDT

## 2025-04-13 ENCOUNTER — APPOINTMENT (OUTPATIENT)
Dept: NUCLEAR MEDICINE | Facility: HOSPITAL | Age: 70
End: 2025-04-13
Payer: MEDICARE

## 2025-04-13 LAB
ANION GAP SERPL CALCULATED.3IONS-SCNC: 10 MMOL/L (ref 5–15)
BASOPHILS # BLD AUTO: 0.03 10*3/MM3 (ref 0–0.2)
BASOPHILS NFR BLD AUTO: 0.4 % (ref 0–1.5)
BH CV REST NUCLEAR ISOTOPE DOSE: 11.3 MCI
BH CV STRESS COMMENTS STAGE 1: NORMAL
BH CV STRESS DOSE REGADENOSON STAGE 1: 0.4
BH CV STRESS DURATION MIN STAGE 1: 0
BH CV STRESS DURATION SEC STAGE 1: 10
BH CV STRESS NUCLEAR ISOTOPE DOSE: 36 MCI
BH CV STRESS PROTOCOL 1: NORMAL
BH CV STRESS RECOVERY BP: NORMAL MMHG
BH CV STRESS RECOVERY HR: 100 BPM
BH CV STRESS STAGE 1: 1
BUN SERPL-MCNC: 11 MG/DL (ref 8–23)
BUN/CREAT SERPL: 14.9 (ref 7–25)
CALCIUM SPEC-SCNC: 9.1 MG/DL (ref 8.6–10.5)
CHLORIDE SERPL-SCNC: 103 MMOL/L (ref 98–107)
CO2 SERPL-SCNC: 23 MMOL/L (ref 22–29)
CREAT SERPL-MCNC: 0.74 MG/DL (ref 0.57–1)
DEPRECATED RDW RBC AUTO: 38.9 FL (ref 37–54)
EGFRCR SERPLBLD CKD-EPI 2021: 87.7 ML/MIN/1.73
EOSINOPHIL # BLD AUTO: 0.31 10*3/MM3 (ref 0–0.4)
EOSINOPHIL NFR BLD AUTO: 3.8 % (ref 0.3–6.2)
ERYTHROCYTE [DISTWIDTH] IN BLOOD BY AUTOMATED COUNT: 12.9 % (ref 12.3–15.4)
GLUCOSE SERPL-MCNC: 98 MG/DL (ref 65–99)
HCT VFR BLD AUTO: 34.8 % (ref 34–46.6)
HGB BLD-MCNC: 12 G/DL (ref 12–15.9)
IMM GRANULOCYTES # BLD AUTO: 0.02 10*3/MM3 (ref 0–0.05)
IMM GRANULOCYTES NFR BLD AUTO: 0.2 % (ref 0–0.5)
LYMPHOCYTES # BLD AUTO: 2.26 10*3/MM3 (ref 0.7–3.1)
LYMPHOCYTES NFR BLD AUTO: 27.6 % (ref 19.6–45.3)
MAXIMAL PREDICTED HEART RATE: 151 BPM
MCH RBC QN AUTO: 28.4 PG (ref 26.6–33)
MCHC RBC AUTO-ENTMCNC: 34.5 G/DL (ref 31.5–35.7)
MCV RBC AUTO: 82.5 FL (ref 79–97)
MONOCYTES # BLD AUTO: 0.7 10*3/MM3 (ref 0.1–0.9)
MONOCYTES NFR BLD AUTO: 8.5 % (ref 5–12)
NEUTROPHILS NFR BLD AUTO: 4.88 10*3/MM3 (ref 1.7–7)
NEUTROPHILS NFR BLD AUTO: 59.5 % (ref 42.7–76)
NRBC BLD AUTO-RTO: 0 /100 WBC (ref 0–0.2)
PERCENT MAX PREDICTED HR: 70.86 %
PLATELET # BLD AUTO: 427 10*3/MM3 (ref 140–450)
PMV BLD AUTO: 9.1 FL (ref 6–12)
POTASSIUM SERPL-SCNC: 3.5 MMOL/L (ref 3.5–5.2)
QT INTERVAL: 372 MS
QTC INTERVAL: 428 MS
RBC # BLD AUTO: 4.22 10*6/MM3 (ref 3.77–5.28)
SODIUM SERPL-SCNC: 136 MMOL/L (ref 136–145)
SPECT HRT GATED+EF W RNC IV: 76 %
STRESS BASELINE BP: NORMAL MMHG
STRESS BASELINE HR: 60 BPM
STRESS PERCENT HR: 83 %
STRESS POST PEAK BP: NORMAL MMHG
STRESS POST PEAK HR: 107 BPM
STRESS TARGET HR: 128 BPM
WBC NRBC COR # BLD AUTO: 8.2 10*3/MM3 (ref 3.4–10.8)

## 2025-04-13 PROCEDURE — A9502 TC99M TETROFOSMIN: HCPCS | Performed by: STUDENT IN AN ORGANIZED HEALTH CARE EDUCATION/TRAINING PROGRAM

## 2025-04-13 PROCEDURE — 93017 CV STRESS TEST TRACING ONLY: CPT

## 2025-04-13 PROCEDURE — 25010000002 REGADENOSON 0.4 MG/5ML SOLUTION: Performed by: STUDENT IN AN ORGANIZED HEALTH CARE EDUCATION/TRAINING PROGRAM

## 2025-04-13 PROCEDURE — 85025 COMPLETE CBC W/AUTO DIFF WBC: CPT | Performed by: PHYSICIAN ASSISTANT

## 2025-04-13 PROCEDURE — 78452 HT MUSCLE IMAGE SPECT MULT: CPT | Performed by: INTERNAL MEDICINE

## 2025-04-13 PROCEDURE — 78452 HT MUSCLE IMAGE SPECT MULT: CPT

## 2025-04-13 PROCEDURE — 80048 BASIC METABOLIC PNL TOTAL CA: CPT | Performed by: STUDENT IN AN ORGANIZED HEALTH CARE EDUCATION/TRAINING PROGRAM

## 2025-04-13 PROCEDURE — 34310000005 TECHNETIUM TETROFOSMIN KIT: Performed by: STUDENT IN AN ORGANIZED HEALTH CARE EDUCATION/TRAINING PROGRAM

## 2025-04-13 PROCEDURE — 25010000002 PIPERACILLIN SOD-TAZOBACTAM PER 1 G: Performed by: STUDENT IN AN ORGANIZED HEALTH CARE EDUCATION/TRAINING PROGRAM

## 2025-04-13 PROCEDURE — 93018 CV STRESS TEST I&R ONLY: CPT | Performed by: INTERNAL MEDICINE

## 2025-04-13 PROCEDURE — 99231 SBSQ HOSP IP/OBS SF/LOW 25: CPT | Performed by: NURSE PRACTITIONER

## 2025-04-13 PROCEDURE — 99232 SBSQ HOSP IP/OBS MODERATE 35: CPT | Performed by: PHYSICIAN ASSISTANT

## 2025-04-13 PROCEDURE — 93016 CV STRESS TEST SUPVJ ONLY: CPT | Performed by: INTERNAL MEDICINE

## 2025-04-13 RX ORDER — REGADENOSON 0.08 MG/ML
0.4 INJECTION, SOLUTION INTRAVENOUS
Status: COMPLETED | OUTPATIENT
Start: 2025-04-13 | End: 2025-04-13

## 2025-04-13 RX ADMIN — PIPERACILLIN AND TAZOBACTAM 3.38 G: 3; .375 INJECTION, POWDER, FOR SOLUTION INTRAVENOUS at 03:10

## 2025-04-13 RX ADMIN — PIPERACILLIN AND TAZOBACTAM 3.38 G: 3; .375 INJECTION, POWDER, FOR SOLUTION INTRAVENOUS at 13:15

## 2025-04-13 RX ADMIN — FAMOTIDINE 20 MG: 20 TABLET, FILM COATED ORAL at 18:50

## 2025-04-13 RX ADMIN — Medication 10 ML: at 08:00

## 2025-04-13 RX ADMIN — LATANOPROST 1 DROP: 50 SOLUTION/ DROPS OPHTHALMIC at 20:36

## 2025-04-13 RX ADMIN — LISINOPRIL 40 MG: 20 TABLET ORAL at 13:39

## 2025-04-13 RX ADMIN — TETROFOSMIN 1 DOSE: 1.38 INJECTION, POWDER, LYOPHILIZED, FOR SOLUTION INTRAVENOUS at 08:54

## 2025-04-13 RX ADMIN — Medication 10 ML: at 20:36

## 2025-04-13 RX ADMIN — REGADENOSON 0.4 MG: 0.08 INJECTION, SOLUTION INTRAVENOUS at 10:25

## 2025-04-13 RX ADMIN — AMLODIPINE BESYLATE 5 MG: 5 TABLET ORAL at 13:39

## 2025-04-13 RX ADMIN — TETROFOSMIN 1 DOSE: 1.38 INJECTION, POWDER, LYOPHILIZED, FOR SOLUTION INTRAVENOUS at 10:25

## 2025-04-13 RX ADMIN — PIPERACILLIN AND TAZOBACTAM 3.38 G: 3; .375 INJECTION, POWDER, FOR SOLUTION INTRAVENOUS at 18:50

## 2025-04-13 NOTE — PROGRESS NOTES
Progress Note    LOS 4    S: Pt developed CP that radiated to her jaw and back yesterday. A rapid response was called and Pt underwent cardiac workup. High-sensitivity troponin x2 WNL and EKG negative for ischemic changes. Pt transferred to  with plans to undergo a nuclear stress test today. Pt reports little abdominal discomfort. Passing gas and small/loose bowel movements. Tolerating clears.     O:  Temp:  [98.4 °F (36.9 °C)-99.1 °F (37.3 °C)] 98.6 °F (37 °C)  Heart Rate:  [65-84] 65  Resp:  [16] 16  BP: (112-137)/(49-67) 127/56      Intake/Output Summary (Last 24 hours) at 4/13/2025 0859  Last data filed at 4/12/2025 2000  Gross per 24 hour   Intake 360 ml   Output --   Net 360 ml       Abd: Soft      Results from last 7 days   Lab Units 04/13/25  0623   WBC 10*3/mm3 8.20   HEMOGLOBIN g/dL 12.0   HEMATOCRIT % 34.8   PLATELETS 10*3/mm3 427     Results from last 7 days   Lab Units 04/13/25  0622   SODIUM mmol/L 136   POTASSIUM mmol/L 3.5   CHLORIDE mmol/L 103   CO2 mmol/L 23.0   BUN mg/dL 11   CREATININE mg/dL 0.74   EGFR mL/min/1.73 87.7   GLUCOSE mg/dL 98   CALCIUM mg/dL 9.1         A/P: 69 y.o. female with a rectal mass    - Colonoscopy performed 04/11/2025 by GI revealed a likely malignant tumor at 18 cm proximal to the anus. Pathology pending.   - Ischemic colitis: WBC normalized. Continue Iv Zosyn.  - Pt will need to be evaluated by oncology and will need CT of chest for cancer staging.   - Pt will need to recover from ischemic colitis prior to surgical intervention to reduce operative and post-operative risks.

## 2025-04-13 NOTE — PROGRESS NOTES
Name: Jeanine Guzman ADMIT: 2025   : 1955  PCP: Kori Patel DO    MRN: [id number] LOS: 2 days   AGE/SEX: 69 y.o. female  ROOM: [facility]     Subjective   Subjective     No events overnight. She reports that the GI cocktail yesterday did help with her symptoms. She underwent a stress test today and the results are pending.        Objective   Objective   Vital Signs  Temp:  [98.4 °F (36.9 °C)-98.6 °F (37 °C)] 98.4 °F (36.9 °C)  Heart Rate:  [65-96] 96  Resp:  [16] 16  BP: (112-138)/(49-67) 138/64  SpO2:  [95 %-98 %] 96 %  on   ;   Device (Oxygen Therapy): room air  Body mass index is 26.16 kg/m².  Physical Exam  Constitutional:       General: She is not in acute distress.     Appearance: She is not toxic-appearing.   Cardiovascular:      Rate and Rhythm: Normal rate and regular rhythm.      Heart sounds: Normal heart sounds.   Pulmonary:      Effort: Pulmonary effort is normal.      Breath sounds: Normal breath sounds.   Abdominal:      General: Bowel sounds are normal. There is no distension.      Palpations: Abdomen is soft.      Tenderness: There is no abdominal tenderness. There is no guarding or rebound.   Musculoskeletal:         General: No tenderness.      Right lower leg: No edema.      Left lower leg: No edema.   Neurological:      Mental Status: She is alert.   Psychiatric:         Mood and Affect: Mood normal.         Behavior: Behavior normal.         Results Review     I reviewed the patient's new clinical results.  Results from last 7 days   Lab Units 25  0623 25  1102 25  0616 04/10/25  0430   WBC 10*3/mm3 8.20 12.53* 13.39* 15.27*   HEMOGLOBIN g/dL 12.0 11.8* 12.8 13.1   PLATELETS 10*3/mm3 427 370 431 379     Results from last 7 days   Lab Units 25  0622 25  0524 04/10/25  0430 25  1710   SODIUM mmol/L 136 137 140 139   POTASSIUM mmol/L 3.5 3.9 3.7 3.7   CHLORIDE mmol/L 103 102 105 102   CO2 mmol/L 23.0 21.3* 24.5 25.4   BUN mg/dL 11 15 20 26*    CREATININE mg/dL 0.74 0.83 0.69 0.79   GLUCOSE mg/dL 98 95 102* 108*   Estimated Creatinine Clearance: 68.5 mL/min (by C-G formula based on SCr of 0.74 mg/dL).  Results from last 7 days   Lab Units 04/11/25  0524 04/09/25  1710   ALBUMIN g/dL 4.1 4.7   BILIRUBIN mg/dL 1.8* 0.9   ALK PHOS U/L 96 130*   AST (SGOT) U/L 23 22   ALT (SGPT) U/L 19 20     Results from last 7 days   Lab Units 04/13/25  0622 04/11/25  0524 04/10/25  0430 04/09/25  1710   CALCIUM mg/dL 9.1 9.2 9.2 9.4   ALBUMIN g/dL  --  4.1  --  4.7     Results from last 7 days   Lab Units 04/09/25  1940   LACTATE mmol/L 1.1     SARS-CoV-2, DAVIE   Date Value Ref Range Status   10/31/2024 NEGATIVE Negative Final   01/20/2022 Positive (A) Negative Final     Comment:     The 2019-CoV rRT-PCR Assay is only for use under a Food and Drug Administration Emergency Use Authorization. The performance characteristics of the assay were verified by the Clinical Microbiology Laboratory at the [facility].   Results should be used in conjunction with the patient's clinical symptoms, medical history, and other clinical/laboratory findings to determine an overall clinical diagnosis. Negative results do not preclude infection with SARS-CoV-2 (COVID-19).    Test parameters have not been validated for screening asymptomatic patients.     Glucose   Date/Time Value Ref Range Status   04/12/2025 1036 110 70 - 130 mg/dL Final       XR Chest 1 View  ONE-VIEW PORTABLE CHEST     HISTORY: Chest pain.     FINDINGS: The lungs are well expanded and clear. The heart is borderline  enlarged and there is no acute disease or change from 12/30/2018.     This report was finalized on 4/12/2025 11:29 AM by Dr. Brannon Wylie M.D on Workstation: [id number]       Scheduled Medications  amLODIPine, 5 mg, Oral, Daily  famotidine, 20 mg, Oral, BID AC  latanoprost, 1 drop, Both Eyes, Nightly  lisinopril, 40 mg, Oral, Q24H  piperacillin-tazobactam, 3.375 g, Intravenous,  Q8H  sodium chloride, 10 mL, Intravenous, Q12H    Infusions     Diet  Diet: Liquid; Clear Liquid; Fluid Consistency: Thin (IDDSI 0)       Assessment/Plan     Active Hospital Problems    Diagnosis  POA    **Colitis [K52.9]  Yes    BRBPR (bright red blood per rectum) [K62.5]  Unknown    Diarrhea [R19.7]  Unknown      Resolved Hospital Problems   No resolved problems to display.       69 y.o. female admitted with Colitis.    Ischemic colitis-continue IV zosyn for translocation prophylaxis  Positive C diff toxin PCR but negative antigen-this likely represents colonization. Her colonoscopy photos are more consistent with ischemic colitis and her diarrhea has slowed, so I stopped the oral vancomycin   Incidentally discovered rectal mass-pathology is pending. If this comes back for cancer, then she'll need CT imaging of her chest/abdomen/pelvis and an oncology consult. Colorectal surgery wishes to delay any surgical procedure until these tests are done and she's recovered from her colitis  Chest pain-stress test results are pending. Echocardiogram is pending as well. She did have symptomatic relief with the GI cocktail, so she's been started on twice daily famotidine  Essential hypertension-well controlled  GERD-famotidine  SCDs for DVT prophylaxis.  Full code.  Discussed with patient and nursing staff.  Anticipate discharge home timing yet to be determined.      Naga Mattson MD  [facility]  04/13/25  14:39 EDT

## 2025-04-13 NOTE — PLAN OF CARE
Goal Outcome Evaluation:            NPO since midnight, A&Ox4, adlib, contact precautions, call light within reach.

## 2025-04-13 NOTE — PROGRESS NOTES
HOSPITAL PROGRESS NOTE    Date of Service: 25  LOS:  LOS: 2 days   Patient Name: Jeanine Guzman  Age/Sex: 69 y.o. female  : 1955  MRN: [id number]  Primary Cardiologist: Dr. Jeni Cifuentes    Subjective:     Chief Complaint/Follow up:   Chest pain    Interval History:   Episode of chest pain yesterday which she felt was due to anxiety when talking on the phone.  Denies further chest pain.  Denies shortness of breath or palpitations.  Has occasional dizziness.  Stress test done earlier today.  Echo has not been done.    Objective:     Objective:  Temp:  [98.4 °F (36.9 °C)-98.6 °F (37 °C)] 98.6 °F (37 °C)  Heart Rate:  [65-82] 65  Resp:  [16] 16  BP: (112-128)/(49-67) 127/56  Body mass index is 26.16 kg/m².    Intake/Output Summary (Last 24 hours) at 2025 1206  Last data filed at 2025  Gross per 24 hour   Intake 360 ml   Output --   Net 360 ml         25  1633 25  2228   Weight: 71.7 kg (158 lb) 69.1 kg (152 lb 6.4 oz)       Physical Exam:   General Appearance: Alert, cooperative, in no acute distress. AAOx4.   Neck: No JVD.  Lungs: Clear. Normal respiratory effort and rate.  Heart: Regular rate and rhythm, normal S1 and S2, no murmurs, gallops or rubs.  Extremities: No edema.     Lab Review:   Results from last 7 days   Lab Units 25  0622 25  0524 04/10/25  0430 25  1710   SODIUM mmol/L 136 137   < > 139   POTASSIUM mmol/L 3.5 3.9   < > 3.7   CHLORIDE mmol/L 103 102   < > 102   CO2 mmol/L 23.0 21.3*   < > 25.4   BUN mg/dL 11 15   < > 26*   CREATININE mg/dL 0.74 0.83   < > 0.79   GLUCOSE mg/dL 98 95   < > 108*   CALCIUM mg/dL 9.1 9.2   < > 9.4   AST (SGOT) U/L  --  23  --  22   ALT (SGPT) U/L  --  19  --  20    < > = values in this interval not displayed.     Results from last 7 days   Lab Units 25  1627 25  1055   HSTROP T ng/L 6 8     Results from last 7 days   Lab Units 25  0623 25  1102   WBC 10*3/mm3 8.20 12.53*    HEMOGLOBIN g/dL 12.0 11.8*   HEMATOCRIT % 34.8 36.8   PLATELETS 10*3/mm3 427 370       I reviewed the patient's new clinical results.  I personally viewed and interpreted the patient's EKG/Telemetry data/Labs/Test Results.     Current Medications:   Scheduled Meds:  amLODIPine, 5 mg, Oral, Daily  famotidine, 20 mg, Oral, BID AC  latanoprost, 1 drop, Both Eyes, Nightly  lisinopril, 40 mg, Oral, Q24H  piperacillin-tazobactam, 3.375 g, Intravenous, Q8H  sodium chloride, 10 mL, Intravenous, Q12H        Allergies:  Allergies   Allergen Reactions    Latex Other (See Comments) and Hives     Serious rash    Morphine Unknown (See Comments) and Other (See Comments)     shaking    Sulfa Antibiotics Rash       Assessment:       Colitis    BRBPR (bright red blood per rectum)    Diarrhea        Plan:   Assessment & Plan       1.  Chest Pain-occurred 4/12.  Troponin x 2 normal.  Mild ST changes on EKG. denies further chest pain.    2.  Ischemic colitis-on IV Zosyn  3.  Positive C. difficile toxin PCR-negative antigen  4.  Rectal mass-pathology pending  5.  GERD  6.  Hypertension-normal    -Myocardial perfusion study completed today-results pending  -Echocardiogram still needs to be done  -Cardiology will follow along      FREDDIE Smith  [facility]   04/13/25  12:06 EDT

## 2025-04-14 ENCOUNTER — APPOINTMENT (OUTPATIENT)
Dept: CARDIOLOGY | Facility: HOSPITAL | Age: 70
End: 2025-04-14
Payer: MEDICARE

## 2025-04-14 ENCOUNTER — TELEPHONE (OUTPATIENT)
Dept: FAMILY MEDICINE CLINIC | Facility: CLINIC | Age: 70
End: 2025-04-14

## 2025-04-14 ENCOUNTER — TELEPHONE (OUTPATIENT)
Dept: FAMILY MEDICINE CLINIC | Facility: CLINIC | Age: 70
End: 2025-04-14
Payer: MEDICARE

## 2025-04-14 ENCOUNTER — APPOINTMENT (OUTPATIENT)
Dept: CT IMAGING | Facility: HOSPITAL | Age: 70
End: 2025-04-14
Payer: MEDICARE

## 2025-04-14 LAB
ANION GAP SERPL CALCULATED.3IONS-SCNC: 6.2 MMOL/L (ref 5–15)
AORTIC ARCH: 2.9 CM
AORTIC DIMENSIONLESS INDEX: 0.68 (DI)
ASCENDING AORTA: 2.5 CM
AV MEAN PRESS GRAD SYS DOP V1V2: 5.5 MMHG
AV VMAX SYS DOP: 167.7 CM/SEC
BACTERIA SPEC AEROBE CULT: NORMAL
BACTERIA SPEC AEROBE CULT: NORMAL
BH CV ECHO MEAS - ACS: 1.88 CM
BH CV ECHO MEAS - AO MAX PG: 11.2 MMHG
BH CV ECHO MEAS - AO ROOT DIAM: 2.9 CM
BH CV ECHO MEAS - AO V2 VTI: 40.7 CM
BH CV ECHO MEAS - AVA(I,D): 2.2 CM2
BH CV ECHO MEAS - EDV(CUBED): 79.2 ML
BH CV ECHO MEAS - EDV(MOD-SP2): 69 ML
BH CV ECHO MEAS - EDV(MOD-SP4): 69 ML
BH CV ECHO MEAS - EF(MOD-SP2): 65.2 %
BH CV ECHO MEAS - EF(MOD-SP4): 59.4 %
BH CV ECHO MEAS - ESV(CUBED): 13.2 ML
BH CV ECHO MEAS - ESV(MOD-SP2): 24 ML
BH CV ECHO MEAS - ESV(MOD-SP4): 28 ML
BH CV ECHO MEAS - FS: 45 %
BH CV ECHO MEAS - IVS/LVPW: 0.97 CM
BH CV ECHO MEAS - IVSD: 0.72 CM
BH CV ECHO MEAS - LAT PEAK E' VEL: 6 CM/SEC
BH CV ECHO MEAS - LV DIASTOLIC VOL/BSA (35-75): 39.6 CM2
BH CV ECHO MEAS - LV MASS(C)D: 93.7 GRAMS
BH CV ECHO MEAS - LV MAX PG: 4 MMHG
BH CV ECHO MEAS - LV MEAN PG: 2.21 MMHG
BH CV ECHO MEAS - LV SYSTOLIC VOL/BSA (12-30): 16.1 CM2
BH CV ECHO MEAS - LV V1 MAX: 100.1 CM/SEC
BH CV ECHO MEAS - LV V1 VTI: 27.8 CM
BH CV ECHO MEAS - LVIDD: 4.3 CM
BH CV ECHO MEAS - LVIDS: 2.36 CM
BH CV ECHO MEAS - LVOT AREA: 3.2 CM2
BH CV ECHO MEAS - LVOT DIAM: 2.03 CM
BH CV ECHO MEAS - LVPWD: 0.75 CM
BH CV ECHO MEAS - MED PEAK E' VEL: 7.5 CM/SEC
BH CV ECHO MEAS - MV A DUR: 0.14 SEC
BH CV ECHO MEAS - MV A MAX VEL: 86.4 CM/SEC
BH CV ECHO MEAS - MV DEC SLOPE: 520.9 CM/SEC2
BH CV ECHO MEAS - MV DEC TIME: 0.24 SEC
BH CV ECHO MEAS - MV E MAX VEL: 93.5 CM/SEC
BH CV ECHO MEAS - MV E/A: 1.08
BH CV ECHO MEAS - MV MAX PG: 6 MMHG
BH CV ECHO MEAS - MV MEAN PG: 2.03 MMHG
BH CV ECHO MEAS - MV P1/2T: 65.9 MSEC
BH CV ECHO MEAS - MV V2 VTI: 35.2 CM
BH CV ECHO MEAS - MVA(P1/2T): 3.3 CM2
BH CV ECHO MEAS - MVA(VTI): 2.5 CM2
BH CV ECHO MEAS - PA ACC TIME: 0.13 SEC
BH CV ECHO MEAS - PA V2 MAX: 99.1 CM/SEC
BH CV ECHO MEAS - PULM A REVS DUR: 0.11 SEC
BH CV ECHO MEAS - PULM A REVS VEL: 24.1 CM/SEC
BH CV ECHO MEAS - PULM DIAS VEL: 43.5 CM/SEC
BH CV ECHO MEAS - PULM S/D: 1.34
BH CV ECHO MEAS - PULM SYS VEL: 58.2 CM/SEC
BH CV ECHO MEAS - QP/QS: 0.81
BH CV ECHO MEAS - RAP SYSTOLE: 3 MMHG
BH CV ECHO MEAS - RV MAX PG: 2.45 MMHG
BH CV ECHO MEAS - RV V1 MAX: 78.2 CM/SEC
BH CV ECHO MEAS - RV V1 VTI: 19.9 CM
BH CV ECHO MEAS - RVOT DIAM: 2.15 CM
BH CV ECHO MEAS - RVSP: 20 MMHG
BH CV ECHO MEAS - SV(LVOT): 89.8 ML
BH CV ECHO MEAS - SV(MOD-SP2): 45 ML
BH CV ECHO MEAS - SV(MOD-SP4): 41 ML
BH CV ECHO MEAS - SV(RVOT): 72.3 ML
BH CV ECHO MEAS - SVI(LVOT): 51.6 ML/M2
BH CV ECHO MEAS - SVI(MOD-SP2): 25.9 ML/M2
BH CV ECHO MEAS - SVI(MOD-SP4): 23.6 ML/M2
BH CV ECHO MEAS - TAPSE (>1.6): 2.48 CM
BH CV ECHO MEAS - TR MAX PG: 17.2 MMHG
BH CV ECHO MEAS - TR MAX VEL: 207.5 CM/SEC
BH CV ECHO MEASUREMENTS AVERAGE E/E' RATIO: 13.85
BH CV XLRA - RV BASE: 3.3 CM
BH CV XLRA - RV LENGTH: 5.5 CM
BH CV XLRA - RV MID: 2.44 CM
BH CV XLRA - TDI S': 10.3 CM/SEC
BUN SERPL-MCNC: 12 MG/DL (ref 8–23)
BUN/CREAT SERPL: 17.4 (ref 7–25)
CALCIUM SPEC-SCNC: 8.9 MG/DL (ref 8.6–10.5)
CHLORIDE SERPL-SCNC: 105 MMOL/L (ref 98–107)
CO2 SERPL-SCNC: 24.8 MMOL/L (ref 22–29)
CREAT SERPL-MCNC: 0.69 MG/DL (ref 0.57–1)
DEPRECATED RDW RBC AUTO: 39.2 FL (ref 37–54)
EGFRCR SERPLBLD CKD-EPI 2021: 94.1 ML/MIN/1.73
ERYTHROCYTE [DISTWIDTH] IN BLOOD BY AUTOMATED COUNT: 12.8 % (ref 12.3–15.4)
GLUCOSE SERPL-MCNC: 89 MG/DL (ref 65–99)
HCT VFR BLD AUTO: 33.6 % (ref 34–46.6)
HGB BLD-MCNC: 10.9 G/DL (ref 12–15.9)
LEFT ATRIUM VOLUME INDEX: 24 ML/M2
LV EF BIPLANE MOD: 63.4 %
MCH RBC QN AUTO: 27.4 PG (ref 26.6–33)
MCHC RBC AUTO-ENTMCNC: 32.4 G/DL (ref 31.5–35.7)
MCV RBC AUTO: 84.4 FL (ref 79–97)
PLATELET # BLD AUTO: 409 10*3/MM3 (ref 140–450)
PMV BLD AUTO: 9.1 FL (ref 6–12)
POTASSIUM SERPL-SCNC: 3.4 MMOL/L (ref 3.5–5.2)
POTASSIUM SERPL-SCNC: 4.2 MMOL/L (ref 3.5–5.2)
RBC # BLD AUTO: 3.98 10*6/MM3 (ref 3.77–5.28)
SINUS: 2.8 CM
SODIUM SERPL-SCNC: 136 MMOL/L (ref 136–145)
STJ: 2.9 CM
WBC NRBC COR # BLD AUTO: 6.62 10*3/MM3 (ref 3.4–10.8)

## 2025-04-14 PROCEDURE — 84132 ASSAY OF SERUM POTASSIUM: CPT | Performed by: HOSPITALIST

## 2025-04-14 PROCEDURE — 99232 SBSQ HOSP IP/OBS MODERATE 35: CPT | Performed by: PHYSICIAN ASSISTANT

## 2025-04-14 PROCEDURE — 25510000001 IOPAMIDOL 61 % SOLUTION: Performed by: HOSPITALIST

## 2025-04-14 PROCEDURE — 85027 COMPLETE CBC AUTOMATED: CPT | Performed by: STUDENT IN AN ORGANIZED HEALTH CARE EDUCATION/TRAINING PROGRAM

## 2025-04-14 PROCEDURE — 71260 CT THORAX DX C+: CPT

## 2025-04-14 PROCEDURE — 99232 SBSQ HOSP IP/OBS MODERATE 35: CPT | Performed by: NURSE PRACTITIONER

## 2025-04-14 PROCEDURE — 93306 TTE W/DOPPLER COMPLETE: CPT

## 2025-04-14 PROCEDURE — 93306 TTE W/DOPPLER COMPLETE: CPT | Performed by: INTERNAL MEDICINE

## 2025-04-14 PROCEDURE — 25010000002 PIPERACILLIN SOD-TAZOBACTAM PER 1 G: Performed by: STUDENT IN AN ORGANIZED HEALTH CARE EDUCATION/TRAINING PROGRAM

## 2025-04-14 PROCEDURE — 80048 BASIC METABOLIC PNL TOTAL CA: CPT | Performed by: STUDENT IN AN ORGANIZED HEALTH CARE EDUCATION/TRAINING PROGRAM

## 2025-04-14 RX ORDER — L.ACID,PARA/B.BIFIDUM/S.THERM 8B CELL
1 CAPSULE ORAL DAILY
Status: DISCONTINUED | OUTPATIENT
Start: 2025-04-14 | End: 2025-04-16 | Stop reason: HOSPADM

## 2025-04-14 RX ORDER — METRONIDAZOLE 500 MG/1
500 TABLET ORAL EVERY 8 HOURS SCHEDULED
Status: DISCONTINUED | OUTPATIENT
Start: 2025-04-14 | End: 2025-04-16 | Stop reason: HOSPADM

## 2025-04-14 RX ORDER — POTASSIUM CHLORIDE 1500 MG/1
40 TABLET, EXTENDED RELEASE ORAL EVERY 4 HOURS
Status: COMPLETED | OUTPATIENT
Start: 2025-04-14 | End: 2025-04-14

## 2025-04-14 RX ORDER — IOPAMIDOL 612 MG/ML
100 INJECTION, SOLUTION INTRAVASCULAR
Status: COMPLETED | OUTPATIENT
Start: 2025-04-14 | End: 2025-04-14

## 2025-04-14 RX ORDER — SACCHAROMYCES BOULARDII 250 MG
250 CAPSULE ORAL 2 TIMES DAILY
Status: DISCONTINUED | OUTPATIENT
Start: 2025-04-14 | End: 2025-04-14

## 2025-04-14 RX ORDER — CEFDINIR 300 MG/1
300 CAPSULE ORAL EVERY 12 HOURS SCHEDULED
Status: COMPLETED | OUTPATIENT
Start: 2025-04-14 | End: 2025-04-16

## 2025-04-14 RX ORDER — ACETAMINOPHEN 325 MG/1
650 TABLET ORAL EVERY 6 HOURS PRN
Status: DISCONTINUED | OUTPATIENT
Start: 2025-04-14 | End: 2025-04-16 | Stop reason: HOSPADM

## 2025-04-14 RX ADMIN — METRONIDAZOLE 500 MG: 500 TABLET ORAL at 21:54

## 2025-04-14 RX ADMIN — PIPERACILLIN AND TAZOBACTAM 3.38 G: 3; .375 INJECTION, POWDER, FOR SOLUTION INTRAVENOUS at 03:42

## 2025-04-14 RX ADMIN — AMLODIPINE BESYLATE 5 MG: 5 TABLET ORAL at 08:53

## 2025-04-14 RX ADMIN — POTASSIUM CHLORIDE 40 MEQ: 1500 TABLET, EXTENDED RELEASE ORAL at 12:14

## 2025-04-14 RX ADMIN — Medication 10 ML: at 08:53

## 2025-04-14 RX ADMIN — FAMOTIDINE 20 MG: 20 TABLET, FILM COATED ORAL at 16:37

## 2025-04-14 RX ADMIN — LATANOPROST 1 DROP: 50 SOLUTION/ DROPS OPHTHALMIC at 20:19

## 2025-04-14 RX ADMIN — Medication 10 ML: at 20:19

## 2025-04-14 RX ADMIN — LISINOPRIL 40 MG: 20 TABLET ORAL at 08:53

## 2025-04-14 RX ADMIN — CEFDINIR 300 MG: 300 CAPSULE ORAL at 21:06

## 2025-04-14 RX ADMIN — POTASSIUM CHLORIDE 40 MEQ: 1500 TABLET, EXTENDED RELEASE ORAL at 16:37

## 2025-04-14 RX ADMIN — FAMOTIDINE 20 MG: 20 TABLET, FILM COATED ORAL at 08:53

## 2025-04-14 RX ADMIN — PIPERACILLIN AND TAZOBACTAM 3.38 G: 3; .375 INJECTION, POWDER, FOR SOLUTION INTRAVENOUS at 12:14

## 2025-04-14 RX ADMIN — IOPAMIDOL 75 ML: 612 INJECTION, SOLUTION INTRAVENOUS at 11:59

## 2025-04-14 RX ADMIN — Medication 1 CAPSULE: at 20:19

## 2025-04-14 NOTE — PROGRESS NOTES
"    Patient Name: Jeanine Guzman  :1955  69 y.o.      Patient Care Team:  Kori Paetl DO as PCP - General (Family Medicine)  Sukhi Land, RN as Registered Nurse  Kori Narayanan RN as Ambulatory  ([facility])    Chief Complaint: follow up chest pain    Interval History: no additional chest pain. Being worked up for malignancy.        Objective   Vital Signs  Temp:  [98.1 °F (36.7 °C)-98.4 °F (36.9 °C)] 98.1 °F (36.7 °C)  Heart Rate:  [59-96] 59  Resp:  [16-18] 18  BP: (128-139)/(57-66) 128/57    Intake/Output Summary (Last 24 hours) at 2025 1110  Last data filed at 2025  Gross per 24 hour   Intake 120 ml   Output --   Net 120 ml     Flowsheet Rows      Flowsheet Row First Filed Value   Admission Height 162.6 cm (64\") Documented at 2025 1633   Admission Weight 71.7 kg (158 lb) Documented at 2025 1633            Physical Exam:   General Appearance:    Alert, cooperative, in no acute distress   Lungs:     Clear to auscultation.  Normal respiratory effort and rate.      Heart:    Regular rhythm and normal rate, normal S1 and S2, no murmurs, gallops or rubs.     Chest Wall:    No abnormalities observed   Abdomen:     Soft, nontender, positive bowel sounds.     Extremities:   no cyanosis, clubbing or edema.  No marked joint deformities.  Adequate musculoskeletal strength.       Results Review:    Results from last 7 days   Lab Units 25  0545   SODIUM mmol/L 136   POTASSIUM mmol/L 3.4*   CHLORIDE mmol/L 105   CO2 mmol/L 24.8   BUN mg/dL 12   CREATININE mg/dL 0.69   GLUCOSE mg/dL 89   CALCIUM mg/dL 8.9     Results from last 7 days   Lab Units 25  1627 25  1055   HSTROP T ng/L 6 8     Results from last 7 days   Lab Units 25  0545   WBC 10*3/mm3 6.62   HEMOGLOBIN g/dL 10.9*   HEMATOCRIT % 33.6*   PLATELETS 10*3/mm3 409                           Medication Review:   amLODIPine, 5 mg, Oral, Daily  famotidine, 20 mg, Oral, BID " AC  latanoprost, 1 drop, Both Eyes, Nightly  lisinopril, 40 mg, Oral, Q24H  piperacillin-tazobactam, 3.375 g, Intravenous, Q8H  sodium chloride, 10 mL, Intravenous, Q12H              Assessment & Plan       1.  Chest Pain-occurred 4/12.  Troponin x 2 normal.  Mild ST changes on EKG. denies further chest pain.  Stress with out ischemia.   2.  Ischemic colitis-on IV Zosyn  3.  Positive C. difficile toxin PCR-negative antigen  4.  Rectal mass-pathology still pending.   5.  GERD  6.  Hypertension- controlled     Stress without ischemia.   If echo ok, will see as needed.     FREDDIE Dawn  [facility]  04/14/25  11:10 EDT    Echocardiogram with preserved LVEF, no regional wall motion abnormality, no significant valve disease. Will sign off.

## 2025-04-14 NOTE — PROGRESS NOTES
CRS attending  Patient has undergone her cardiac workup with no ischemia found  CT scan of the chest showed indeterminate lung nodule with close follow-up recommended    Patient complaining about diarrhea    Afebrile vital signs are stable  Abdomen soft  White count returned to normal    Assessment /plan : ischemic colitis -the patient converted to p.o. antibiotics.  Diet will be advanced as tolerates  Rectosigmoid mass-pathology still pending.  Will plan on doing CT scan abdomen and pelvis as a follow-up to colitis and staging in 2 to 3 weeks.

## 2025-04-14 NOTE — PLAN OF CARE
Goal Outcome Evaluation:              Outcome Evaluation: Pt A&Ox4, up ad alvaro, shower today, room air, no reports of chest pain/soa,nausea,  CT chest completed, advanced to full liquid diet, liquid stool with urgency continues to be problematic for patient, SR on monitor with vitals as charted,

## 2025-04-14 NOTE — TELEPHONE ENCOUNTER
Caller: Jeanine Guzman    Relationship: Self    Best call back number: [phone]      What is the best time to reach you: ANY    Who are you requesting to speak with (clinical staff, provider,  specific staff member)CLINICAL STAFF STAFF     Do you know the name of the person who called:      What was the call regarding: PATIENT STATED THAT SHE MAY BE UNABLE TO SCHEDULE AN APPOINTMENT BEFORE NEEDING REFILL AGAIN DUE TO CURRENTLY ADMITTED IN THE HOSPITAL PATIENT WOULD LIKE A CALL FROM DR. ELIZONDO TO DISCUSS HER CONCERN. PLEASE CALL PATIENT TO ADVISE.    Is it okay if the provider responds through MyChart:

## 2025-04-14 NOTE — PROGRESS NOTES
[facility], [facility]  Inpatient Progress Note    Reason for Followup: Colon mass    Subjective     Interval History:   Patient does report some fecal urgency this morning.  No abdominal pain, nausea, vomiting, fevers, blood in stool    Current Facility-Administered Medications:     amLODIPine (NORVASC) tablet 5 mg, 5 mg, Oral, Daily, Juan Jose Horner MD, 5 mg at 04/13/25 1339    dicyclomine (BENTYL) capsule 20 mg, 20 mg, Oral, TID PRN, Juan Jose Horner MD    famotidine (PEPCID) tablet 20 mg, 20 mg, Oral, BID AC, Naga Mattson MD, 20 mg at 04/13/25 1850    HYDROcodone-acetaminophen (NORCO) 5-325 MG per tablet 1 tablet, 1 tablet, Oral, Q4H PRN, Juan Jose Horner MD, 1 tablet at 04/12/25 0336    HYDROmorphone (DILAUDID) injection 0.5 mg, 0.5 mg, Intravenous, Q2H PRN, Juan Jose Horner MD, 0.5 mg at 04/11/25 2023    latanoprost (XALATAN) 0.005 % ophthalmic solution 1 drop, 1 drop, Both Eyes, Nightly, Juan Jose Horner MD, 1 drop at 04/13/25 2036    lisinopril (PRINIVIL,ZESTRIL) tablet 40 mg, 40 mg, Oral, Q24H, Juan Jose Horner MD, 40 mg at 04/13/25 1339    nitroglycerin (NITROSTAT) SL tablet 0.4 mg, 0.4 mg, Sublingual, Q5 Min PRN, Naga Mattson MD    ondansetron ODT (ZOFRAN-ODT) disintegrating tablet 4 mg, 4 mg, Oral, Q6H PRN **OR** ondansetron (ZOFRAN) injection 4 mg, 4 mg, Intravenous, Q6H PRN, Juan Jose Horner MD, 4 mg at 04/11/25 0721    piperacillin-tazobactam (ZOSYN) 3.375 g IVPB in 100 mL NS MBP (CD), 3.375 g, Intravenous, Q8H, Naga Mattson MD, 3.375 g at 04/14/25 0342    [COMPLETED] Insert Peripheral IV, , , Once **AND** sodium chloride 0.9 % flush 10 mL, 10 mL, Intravenous, PRN, Juan Jose Horner MD    sodium chloride 0.9 % flush 10 mL, 10 mL, Intravenous, Q12H, Juan Jose Horner MD, 10 mL at 04/13/25 2036    sodium chloride 0.9 % flush 10 mL, 10 mL, Intravenous, PRN, Juan Jose Horner MD    sodium chloride 0.9 % infusion 40 mL, 40 mL, Intravenous, PRN,  Juan Jose Horner MD    Objective     Vital Signs  Temp:  [98.1 °F (36.7 °C)-98.4 °F (36.9 °C)] 98.1 °F (36.7 °C)  Heart Rate:  [59-96] 59  Resp:  [16-18] 18  BP: (128-139)/(57-66) 128/57  Body mass index is 26.09 kg/m².    Intake/Output Summary (Last 24 hours) at 4/14/2025 0833  Last data filed at 4/13/2025 2000  Gross per 24 hour   Intake 120 ml   Output --   Net 120 ml     No intake/output data recorded.     Physical Exam:   General: patient awake, alert and cooperative   Abdomen: soft, nontender, nondistended; normal bowel sounds     Results Review:     I reviewed the patient's new clinical results.    Results from last 7 days   Lab Units 04/14/25  0545 04/13/25  0623 04/12/25  1102   WBC 10*3/mm3 6.62 8.20 12.53*   HEMOGLOBIN g/dL 10.9* 12.0 11.8*   HEMATOCRIT % 33.6* 34.8 36.8   PLATELETS 10*3/mm3 409 427 370     Results from last 7 days   Lab Units 04/14/25  0545 04/13/25  0622 04/11/25  0524 04/10/25  0430 04/09/25  1710   SODIUM mmol/L 136 136 137   < > 139   POTASSIUM mmol/L 3.4* 3.5 3.9   < > 3.7   CHLORIDE mmol/L 105 103 102   < > 102   CO2 mmol/L 24.8 23.0 21.3*   < > 25.4   BUN mg/dL 12 11 15   < > 26*   CREATININE mg/dL 0.69 0.74 0.83   < > 0.79   CALCIUM mg/dL 8.9 9.1 9.2   < > 9.4   BILIRUBIN mg/dL  --   --  1.8*  --  0.9   ALK PHOS U/L  --   --  96  --  130*   ALT (SGPT) U/L  --   --  19  --  20   AST (SGOT) U/L  --   --  23  --  22   GLUCOSE mg/dL 89 98 95   < > 108*    < > = values in this interval not displayed.         Lab Results   Lab Value Date/Time    LIPASE 13 04/09/2025 1710    LIPASE 28.00 12/08/2015 0024       Radiology:  XR Chest 1 View   Final Result      CT Abdomen Pelvis With Contrast   Final Result   1. Moderate colitis involving the descending colon and to a lesser   extent, the transverse colon.   2. More isolated short segment distal sigmoid colon suspected masslike   wall thickening. This needs a follow-up colonoscopy to exclude an   underlying lesion. Decompressed state of  the colon does limit   evaluation.   3. Sigmoid colon diverticulosis.       This report was finalized on 4/9/2025 8:12 PM by Dr. Freddie Basurto M.D on Workstation: [id number]          CT Chest With Contrast Diagnostic    (Results Pending)   CT Abdomen Pelvis With Contrast    (Results Pending)     Colonoscopy 4/11/2020 515 mm polyp in the transverse colon, diverticulosis of the sigmoid colon, inflamed and ulcerated mucosa in the sigmoid and descending colon, nonbleeding internal hemorrhoids, likely malignant tumor at 18 cm proximal to the anus.    Assessment & Plan   Assessment:   Colonic mass - path pending   Ischemic colitis   Rectal Bleeding  GERD    All problems are new to me.  Plan:   Pathology pending  Colorectal surgery following  CT chest/abdomen, pelvis pending  Continue PPI therapy  Patient on Zosyn prophylactically for ischemic colitis  Further management per surgery and oncology.  GI will sign off at this time.  Thank you for allowing us to participate in the care of this patient.    I discussed the patient's findings and my recommendations with patient.    Dictated utilizing Dragon dictation.        Melonie Pryor PA-C   [facility]  [address] [place] [address]  Office: [phone]

## 2025-04-14 NOTE — TELEPHONE ENCOUNTER
Spoke to pt and informed her to contact office when she is discharged and we will get her schedule for a hospital follow up and a medication refill

## 2025-04-14 NOTE — PROGRESS NOTES
Name: Jeanine Guzman ADMIT: 2025   : 1955  PCP: Kori Patel DO    MRN: [id number] LOS: 3 days   AGE/SEX: 69 y.o. female  ROOM: [facility]     Subjective   Subjective   C/o urge fecal incontinence.  Reports she cannot make it to the bathroom in time due to IV pole and trying to navigate all of the wires she is attached to    She had nurse reach out to strongly request probiotics this afternoon.       Objective   Objective   Vital Signs  Temp:  [98.1 °F (36.7 °C)-98.2 °F (36.8 °C)] 98.1 °F (36.7 °C)  Heart Rate:  [59-80] 73  Resp:  [16-18] 18  BP: (124-139)/(57-66) 124/59  SpO2:  [90 %-94 %] 93 %  on   ;   Device (Oxygen Therapy): room air  Body mass index is 26.09 kg/m².  Physical Exam  Vitals reviewed.   Constitutional:       General: She is not in acute distress.     Appearance: She is not ill-appearing.   Cardiovascular:      Rate and Rhythm: Normal rate and regular rhythm.   Pulmonary:      Effort: No respiratory distress.      Breath sounds: Normal breath sounds. No wheezing.   Abdominal:      General: There is no distension.      Palpations: Abdomen is soft.      Tenderness: There is no abdominal tenderness.   Musculoskeletal:      Right lower leg: No edema.      Left lower leg: No edema.   Skin:     General: Skin is warm and dry.   Neurological:      Mental Status: She is alert.       Results Review     I reviewed the patient's new clinical results.  Results from last 7 days   Lab Units 25  0545 25  0623 25  1102 25  0616   WBC 10*3/mm3 6.62 8.20 12.53* 13.39*   HEMOGLOBIN g/dL 10.9* 12.0 11.8* 12.8   PLATELETS 10*3/mm3 409 427 370 431     Results from last 7 days   Lab Units 25  0545 25  0622 25  0524 04/10/25  0430   SODIUM mmol/L 136 136 137 140   POTASSIUM mmol/L 3.4* 3.5 3.9 3.7   CHLORIDE mmol/L 105 103 102 105   CO2 mmol/L 24.8 23.0 21.3* 24.5   BUN mg/dL 12 11 15 20   CREATININE mg/dL 0.69 0.74 0.83 0.69   GLUCOSE mg/dL 89 98 95 102*   EGFR  mL/min/1.73 94.1 87.7 76.4 94.1     Results from last 7 days   Lab Units 04/11/25  0524 04/09/25  1710   ALBUMIN g/dL 4.1 4.7   BILIRUBIN mg/dL 1.8* 0.9   ALK PHOS U/L 96 130*   AST (SGOT) U/L 23 22   ALT (SGPT) U/L 19 20     Results from last 7 days   Lab Units 04/14/25  0545 04/13/25  0622 04/11/25  0524 04/10/25  0430 04/09/25  1710   CALCIUM mg/dL 8.9 9.1 9.2 9.2 9.4   ALBUMIN g/dL  --   --  4.1  --  4.7     Results from last 7 days   Lab Units 04/09/25  1940   LACTATE mmol/L 1.1     Glucose   Date/Time Value Ref Range Status   04/12/2025 1036 110 70 - 130 mg/dL Final       CT Chest With Contrast Diagnostic  Result Date: 4/14/2025  1. No CT evidence of metastatic disease within the chest. 2. A subcentimeter nodular focus along the proximal posterior tracheal membrane could represent a tiny focus of aspiration but recommend a follow-up chest CT in 2 to 3 months to ensure clearance and ensure this does not represent a small tracheal nodule. 3. A 1 cm arterial enhancing splenic lesion is statistically, most likely benign. Attention on follow-up.   This report was finalized on 4/14/2025 1:33 PM by Dr. Freddie Basurto M.D on Workstation: [id number]        I have personally reviewed all medications:  Scheduled Medications  amLODIPine, 5 mg, Oral, Daily  cefdinir, 300 mg, Oral, Q12H  famotidine, 20 mg, Oral, BID AC  latanoprost, 1 drop, Both Eyes, Nightly  lisinopril, 40 mg, Oral, Q24H  metroNIDAZOLE, 500 mg, Oral, Q8H  sodium chloride, 10 mL, Intravenous, Q12H    Infusions   Diet  Diet: Liquid; Full Liquid; Fluid Consistency: Thin (IDDSI 0)    I have personally reviewed:  [x]  Laboratory   [x]  Microbiology   [x]  Radiology   [x]  EKG/Telemetry  [x]  Cardiology/Vascular   []  Pathology    []  Records       Assessment/Plan     Active Hospital Problems    Diagnosis  POA    **Colitis [K52.9]  Yes    BRBPR (bright red blood per rectum) [K62.5]  Unknown    Diarrhea [R19.7]  Unknown      Resolved Hospital Problems   No  resolved problems to display.       69 y.o. female admitted with Colitis, possibly ischemic per endoscopy results with incidentally found rectal mass    Colitis: Persistent diarrhea and fecal incontinence.  Given lack of growth on any cultures, will discontinue Zosyn.  De-escalate to oral antibiotics for couple more days.  - C. difficile equivocal.  Holding off on additional Vanco for now  - Requests probiotics but I am not sure they will be helpful since she is still getting antibiotics at the same time.  - Talked with her about the need for loose/soft stools while rectal masses present to avoid obstruction    Still waiting on pathology for rectal mass.  Colorectal surgery follow  -CT chest negative for metastases.  Follow-up recommended for incidental nodule  - CT abdomen pending    Chest pain: Negative cardiac workup.  Stress test and echocardiogram unremarkable.  - Discontinue telemetry  - Continue famotidine, may have been GERD      DVT prophylaxis: SCDs  Disposition: Potentially home soon depending on loose stools and also surgery plans    Freddie Eric MD  [facility]  04/14/25  16:39 EDT

## 2025-04-14 NOTE — PROGRESS NOTES
Colorectal Surgery Progress Note    S: Pt sitting on commode after an episode of fecal urgency incontinence. Denies blood in stool, abdominal pain, fever or chills. Says she is hungry and stomach has been rumbling.     O:  Temp:  [98.1 °F (36.7 °C)-98.4 °F (36.9 °C)] 98.1 °F (36.7 °C)  Heart Rate:  [59-96] 59  Resp:  [16-18] 18  BP: (128-139)/(57-66) 128/57    Intake/Output Summary (Last 24 hours) at 4/14/2025 0930  Last data filed at 4/13/2025 2000  Gross per 24 hour   Intake 120 ml   Output --   Net 120 ml     Exam:   Sitting on commode, no acute distress  Awake, alert, oriented    Results from last 7 days   Lab Units 04/14/25  0545   WBC 10*3/mm3 6.62   HEMOGLOBIN g/dL 10.9*   HEMATOCRIT % 33.6*   PLATELETS 10*3/mm3 409     Results from last 7 days   Lab Units 04/14/25  0545   SODIUM mmol/L 136   POTASSIUM mmol/L 3.4*   CHLORIDE mmol/L 105   CO2 mmol/L 24.8   BUN mg/dL 12   CREATININE mg/dL 0.69   EGFR mL/min/1.73 94.1   GLUCOSE mg/dL 89   CALCIUM mg/dL 8.9         A/P: 69 y.o. female with rectal mass and ischemic colitis  Pathology of mass is pending  CT chest/abdomen/pelvis have been ordered  Continue Zosyn   Advance diet to full liquids

## 2025-04-14 NOTE — CASE MANAGEMENT/SOCIAL WORK
Continued Stay Note  [facility]     Patient Name: Jeanine Guzman  MRN: [id number]  Today's Date: 4/14/2025    Admit Date: 4/9/2025    Plan: Home with    Discharge Plan       Row Name 04/14/25 1632       Plan    Plan Home with     Patient/Family in Agreement with Plan yes    Plan Comments CCP reviewed chart, discussed with primary RN, cardiology awaiting echo results for dc clearance. GI has signed off on pt with plan for surgery and potentially oncology to follow. Await pathology of colon mass, patient mobilizing well, still plans home with  CCP will follow - [facility]                   Discharge Codes    No documentation.                 Expected Discharge Date and Time       Expected Discharge Date Expected Discharge Time    Apr 15, 2025               Rosalia Maldonado RN

## 2025-04-14 NOTE — PLAN OF CARE
Problem: Adult Inpatient Plan of Care  Goal: Absence of Hospital-Acquired Illness or Injury  Intervention: Identify and Manage Fall Risk  Recent Flowsheet Documentation  Taken 4/14/2025 0400 by Vanesa Leach RN  Safety Promotion/Fall Prevention:   safety round/check completed   room organization consistent   assistive device/personal items within reach   clutter free environment maintained  Taken 4/14/2025 0200 by Vanesa Leach RN  Safety Promotion/Fall Prevention:   safety round/check completed   room organization consistent   assistive device/personal items within reach   clutter free environment maintained  Taken 4/14/2025 0000 by Vaensa Leach RN  Safety Promotion/Fall Prevention:   nonskid shoes/slippers when out of bed   assistive device/personal items within reach   clutter free environment maintained  Taken 4/13/2025 2200 by Vanesa Leach RN  Safety Promotion/Fall Prevention:   safety round/check completed   assistive device/personal items within reach   clutter free environment maintained  Taken 4/13/2025 2000 by Vanesa Leach RN  Safety Promotion/Fall Prevention: toileting scheduled  Intervention: Prevent Skin Injury  Recent Flowsheet Documentation  Taken 4/14/2025 0400 by Vanesa Leach RN  Body Position:   position changed independently   weight shifting   side-lying  Taken 4/14/2025 0200 by Vanesa Leach RN  Body Position:   position changed independently   weight shifting  Taken 4/14/2025 0000 by Vanesa Leach RN  Body Position: position changed independently  Skin Protection: transparent dressing maintained  Taken 4/13/2025 2200 by Vanesa Leach RN  Body Position:   position changed independently   weight shifting  Taken 4/13/2025 2000 by Vanesa Leach RN  Body Position: position changed independently  Skin Protection:   transparent dressing maintained   protective footwear used  Intervention: Prevent and Manage VTE (Venous Thromboembolism) Risk  Recent Flowsheet  Documentation  Taken 4/14/2025 0000 by Vanesa Leach RN  VTE Prevention/Management:   compression stockings off   SCDs (sequential compression devices) off   bilateral  Taken 4/13/2025 2000 by Vanesa Leach RN  VTE Prevention/Management:   bilateral   SCDs (sequential compression devices) off   patient refused intervention  Intervention: Prevent Infection  Recent Flowsheet Documentation  Taken 4/14/2025 0400 by Vanesa Leach RN  Infection Prevention: rest/sleep promoted  Taken 4/14/2025 0200 by Vanesa Leach RN  Infection Prevention:   rest/sleep promoted   single patient room provided   environmental surveillance performed  Taken 4/14/2025 0000 by Vanesa Leach RN  Infection Prevention:   single patient room provided   rest/sleep promoted  Taken 4/13/2025 2200 by Vanesa Leach RN  Infection Prevention:   rest/sleep promoted   single patient room provided   environmental surveillance performed  Taken 4/13/2025 2000 by Vanesa Leach RN  Infection Prevention:   single patient room provided   environmental surveillance performed   rest/sleep promoted  Goal: Optimal Comfort and Wellbeing  Intervention: Provide Person-Centered Care  Recent Flowsheet Documentation  Taken 4/14/2025 0000 by Vanesa Leach RN  Trust Relationship/Rapport:   care explained   choices provided  Taken 4/13/2025 2000 by Vanesa Leach RN  Trust Relationship/Rapport:   care explained   choices provided   Goal Outcome Evaluation:

## 2025-04-15 DIAGNOSIS — K62.89 RECTAL MASS: Primary | ICD-10-CM

## 2025-04-15 PROCEDURE — 99232 SBSQ HOSP IP/OBS MODERATE 35: CPT | Performed by: PHYSICIAN ASSISTANT

## 2025-04-15 RX ORDER — CALCIUM POLYCARBOPHIL 625 MG
625 TABLET ORAL 2 TIMES DAILY
Status: DISCONTINUED | OUTPATIENT
Start: 2025-04-15 | End: 2025-04-16 | Stop reason: HOSPADM

## 2025-04-15 RX ADMIN — FAMOTIDINE 20 MG: 20 TABLET, FILM COATED ORAL at 18:57

## 2025-04-15 RX ADMIN — CALCIUM POLYCARBOPHIL 625 MG: 625 TABLET, FILM COATED ORAL at 20:50

## 2025-04-15 RX ADMIN — METRONIDAZOLE 500 MG: 500 TABLET ORAL at 20:50

## 2025-04-15 RX ADMIN — CEFDINIR 300 MG: 300 CAPSULE ORAL at 09:12

## 2025-04-15 RX ADMIN — FAMOTIDINE 20 MG: 20 TABLET, FILM COATED ORAL at 06:31

## 2025-04-15 RX ADMIN — Medication 10 ML: at 09:02

## 2025-04-15 RX ADMIN — Medication 1 CAPSULE: at 09:11

## 2025-04-15 RX ADMIN — LATANOPROST 1 DROP: 50 SOLUTION/ DROPS OPHTHALMIC at 20:56

## 2025-04-15 RX ADMIN — AMLODIPINE BESYLATE 5 MG: 5 TABLET ORAL at 09:12

## 2025-04-15 RX ADMIN — Medication 10 ML: at 20:50

## 2025-04-15 RX ADMIN — LISINOPRIL 40 MG: 20 TABLET ORAL at 09:12

## 2025-04-15 RX ADMIN — CEFDINIR 300 MG: 300 CAPSULE ORAL at 20:50

## 2025-04-15 RX ADMIN — METRONIDAZOLE 500 MG: 500 TABLET ORAL at 15:32

## 2025-04-15 RX ADMIN — METRONIDAZOLE 500 MG: 500 TABLET ORAL at 05:32

## 2025-04-15 NOTE — PROGRESS NOTES
Name: Jeanine Guzman ADMIT: 2025   : 1955  PCP: Kori Patel DO    MRN: [id number] LOS: 4 days   AGE/SEX: 69 y.o. female  ROOM: [facility]     Subjective   Subjective   Patient still complaining of quite a bit of diarrhea.  Not sure all are being recorded in the I/O.  Anxious about potentially going home       Objective   Objective   Vital Signs  Temp:  [98 °F (36.7 °C)-98.3 °F (36.8 °C)] 98.3 °F (36.8 °C)  Heart Rate:  [50-70] 70  Resp:  [16] 16  BP: (117-150)/(53-74) 150/65  SpO2:  [96 %-98 %] 98 %  on   ;   Device (Oxygen Therapy): room air  Body mass index is 26.09 kg/m².  Physical Exam  Vitals reviewed.   Constitutional:       General: She is not in acute distress.     Appearance: She is not ill-appearing.   Cardiovascular:      Rate and Rhythm: Normal rate and regular rhythm.   Pulmonary:      Effort: No respiratory distress.      Breath sounds: Normal breath sounds. No wheezing.   Abdominal:      General: There is no distension.      Palpations: Abdomen is soft.      Tenderness: There is no abdominal tenderness.   Musculoskeletal:      Right lower leg: No edema.      Left lower leg: No edema.   Skin:     General: Skin is warm and dry.   Neurological:      Mental Status: She is alert.       Results Review     I reviewed the patient's new clinical results.  Results from last 7 days   Lab Units 25  0545 25  0623 25  1102 25  0616   WBC 10*3/mm3 6.62 8.20 12.53* 13.39*   HEMOGLOBIN g/dL 10.9* 12.0 11.8* 12.8   PLATELETS 10*3/mm3 409 427 370 431     Results from last 7 days   Lab Units 25  0545 25  0622 25  0524 04/10/25  0430   SODIUM mmol/L  --  136 136 137 140   POTASSIUM mmol/L 4.2 3.4* 3.5 3.9 3.7   CHLORIDE mmol/L  --  105 103 102 105   CO2 mmol/L  --  24.8 23.0 21.3* 24.5   BUN mg/dL  --  12 11 15 20   CREATININE mg/dL  --  0.69 0.74 0.83 0.69   GLUCOSE mg/dL  --  89 98 95 102*   EGFR mL/min/1.73  --  94.1 87.7 76.4 94.1     Results  "from last 7 days   Lab Units 04/11/25  0524 04/09/25  1710   ALBUMIN g/dL 4.1 4.7   BILIRUBIN mg/dL 1.8* 0.9   ALK PHOS U/L 96 130*   AST (SGOT) U/L 23 22   ALT (SGPT) U/L 19 20     Results from last 7 days   Lab Units 04/14/25  0545 04/13/25  0622 04/11/25  0524 04/10/25  0430 04/09/25  1710   CALCIUM mg/dL 8.9 9.1 9.2 9.2 9.4   ALBUMIN g/dL  --   --  4.1  --  4.7     Results from last 7 days   Lab Units 04/09/25  1940   LACTATE mmol/L 1.1     No results found for: \"HGBA1C\", \"POCGLU\"      CT Chest With Contrast Diagnostic  Result Date: 4/14/2025  1. No CT evidence of metastatic disease within the chest. 2. A subcentimeter nodular focus along the proximal posterior tracheal membrane could represent a tiny focus of aspiration but recommend a follow-up chest CT in 2 to 3 months to ensure clearance and ensure this does not represent a small tracheal nodule. 3. A 1 cm arterial enhancing splenic lesion is statistically, most likely benign. Attention on follow-up.   This report was finalized on 4/14/2025 1:33 PM by Dr. Freddie Basurto M.D on Workstation: [id number]        I have personally reviewed all medications:  Scheduled Medications  amLODIPine, 5 mg, Oral, Daily  cefdinir, 300 mg, Oral, Q12H  famotidine, 20 mg, Oral, BID AC  lactobacillus acidophilus, 1 capsule, Oral, Daily  latanoprost, 1 drop, Both Eyes, Nightly  lisinopril, 40 mg, Oral, Q24H  metroNIDAZOLE, 500 mg, Oral, Q8H  calcium polycarbophil, 625 mg, Oral, BID  sodium chloride, 10 mL, Intravenous, Q12H    Infusions   Diet  Diet: Regular/House; Fluid Consistency: Thin (IDDSI 0)    I have personally reviewed:  [x]  Laboratory   [x]  Microbiology   [x]  Radiology   [x]  EKG/Telemetry  [x]  Cardiology/Vascular   []  Pathology    []  Records       Assessment/Plan     Active Hospital Problems    Diagnosis  POA    **Colitis [K52.9]  Yes    BRBPR (bright red blood per rectum) [K62.5]  Unknown    Diarrhea [R19.7]  Unknown      Resolved Hospital Problems   No " resolved problems to display.       69 y.o. female admitted with Colitis, possibly ischemic per endoscopy results with incidentally found rectal mass    Colitis: Persistent diarrhea and fecal incontinence.  De-escalated antibiotics yesterday, will monitor for improvement in the diarrhea  - Discussed with colorectal team.  Fiber supplement added  - Continue probiotics per patient request  - C. difficile equivocal.  Holding off on additional Vanco for now    Still waiting on pathology for rectal mass.  Colorectal surgery follow  -CT chest negative for metastases.  Follow-up recommended for incidental nodule  - CT abdomen to be done outpatient per CRS    Chest pain: Negative cardiac workup.  Stress test and echocardiogram unremarkable.  - Continue famotidine, may have been GERD      DVT prophylaxis: SCDs  Disposition: Potentially home 1 to 2 days pending improvement    Freddie Eric MD  [facility]  04/15/25  16:33 EDT

## 2025-04-15 NOTE — PROGRESS NOTES
Colorectal Surgery Progress Note    S: Had 5 bowel movements between 3 AM and 7 AM this morning.  Reports a twinge of abdominal pain. Afraid to eat much for fear of having fecal urge incontinence, but tolerating regular diet.     O:  Temp:  [98 °F (36.7 °C)-98.3 °F (36.8 °C)] 98.3 °F (36.8 °C)  Heart Rate:  [50-70] 70  Resp:  [16] 16  BP: (117-150)/(53-74) 150/65  No intake or output data in the 24 hours ending 04/15/25 1341    Abd: soft, non distended.  Incisions: clean, dry, intact, no erythema    Results from last 7 days   Lab Units 04/14/25  0545   WBC 10*3/mm3 6.62   HEMOGLOBIN g/dL 10.9*   HEMATOCRIT % 33.6*   PLATELETS 10*3/mm3 409     Results from last 7 days   Lab Units 04/14/25  1951 04/14/25  0545   SODIUM mmol/L  --  136   POTASSIUM mmol/L 4.2 3.4*   CHLORIDE mmol/L  --  105   CO2 mmol/L  --  24.8   BUN mg/dL  --  12   CREATININE mg/dL  --  0.69   EGFR mL/min/1.73  --  94.1   GLUCOSE mg/dL  --  89   CALCIUM mg/dL  --  8.9         A/P: 69 y.o. female with rectal mass and ischemic colitis   Tumor biopsy still pending  Fiber started twice daily to form up stool  Order placed for outpatient CT of abdomen and pelvis with IV and oral contrast in 2 to 3 weeks.  Our office will call patient to schedule.  Stable from CRS perspective for discharge. Discussed with Dr. Eric--likely discharge tomorrow.

## 2025-04-15 NOTE — PLAN OF CARE
Problem: Adult Inpatient Plan of Care  Goal: Absence of Hospital-Acquired Illness or Injury  Outcome: Progressing  Intervention: Identify and Manage Fall Risk  Recent Flowsheet Documentation  Taken 4/15/2025 0400 by Jonathan Zhang RN  Safety Promotion/Fall Prevention:   safety round/check completed   room organization consistent   nonskid shoes/slippers when out of bed   lighting adjusted   clutter free environment maintained   assistive device/personal items within reach  Taken 4/15/2025 0200 by Jonathan Zhang RN  Safety Promotion/Fall Prevention:   safety round/check completed   room organization consistent   nonskid shoes/slippers when out of bed   lighting adjusted   clutter free environment maintained   assistive device/personal items within reach  Taken 4/15/2025 0000 by Jonathan Zhang RN  Safety Promotion/Fall Prevention:   safety round/check completed   room organization consistent   nonskid shoes/slippers when out of bed   lighting adjusted   clutter free environment maintained   assistive device/personal items within reach  Taken 4/14/2025 2200 by Jonathan Zhang RN  Safety Promotion/Fall Prevention:   safety round/check completed   room organization consistent   nonskid shoes/slippers when out of bed   lighting adjusted   clutter free environment maintained   assistive device/personal items within reach  Taken 4/14/2025 2035 by Jonathan Zhang RN  Safety Promotion/Fall Prevention:   safety round/check completed   room organization consistent   nonskid shoes/slippers when out of bed   lighting adjusted   clutter free environment maintained   assistive device/personal items within reach  Intervention: Prevent Skin Injury  Recent Flowsheet Documentation  Taken 4/15/2025 0400 by Jonathan Zhang RN  Body Position: position changed independently  Taken 4/15/2025 0200 by Jonathan Zhang RN  Body Position: position changed independently  Taken  4/15/2025 0000 by Jonathan Zhang RN  Body Position: position changed independently  Taken 4/14/2025 2200 by Jonathan Zhang RN  Body Position: position changed independently  Taken 4/14/2025 2035 by Jonathan Zhang RN  Body Position: position changed independently  Intervention: Prevent and Manage VTE (Venous Thromboembolism) Risk  Recent Flowsheet Documentation  Taken 4/14/2025 2035 by Jonathan Zhang RN  VTE Prevention/Management:   bilateral   SCDs (sequential compression devices) off   patient refused intervention  Intervention: Prevent Infection  Recent Flowsheet Documentation  Taken 4/15/2025 0400 by Jonathan Zhang RN  Infection Prevention:   environmental surveillance performed   equipment surfaces disinfected   hand hygiene promoted   personal protective equipment utilized   rest/sleep promoted   single patient room provided  Taken 4/15/2025 0200 by Jonathan Zhang RN  Infection Prevention:   environmental surveillance performed   single patient room provided   rest/sleep promoted   personal protective equipment utilized   hand hygiene promoted   equipment surfaces disinfected  Taken 4/15/2025 0000 by Jonathan Zhang RN  Infection Prevention:   environmental surveillance performed   equipment surfaces disinfected   hand hygiene promoted   personal protective equipment utilized   rest/sleep promoted   single patient room provided  Taken 4/14/2025 2200 by Jonathan Zhang RN  Infection Prevention:   environmental surveillance performed   equipment surfaces disinfected   hand hygiene promoted   personal protective equipment utilized   rest/sleep promoted   single patient room provided  Taken 4/14/2025 2035 by Jonathan Zhang RN  Infection Prevention:   environmental surveillance performed   equipment surfaces disinfected   hand hygiene promoted   personal protective equipment utilized   rest/sleep promoted   single patient room  provided   Goal Outcome Evaluation:  Plan of Care Reviewed With: patient        Progress: no change
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
## 2025-04-15 NOTE — PLAN OF CARE
Goal Outcome Evaluation:           Progress: improving  Outcome Evaluation: Pt A&Ox4, room air, up ad alvaro, reports BM x5 this shift but stool consistency becoming less liquidy,  no reports SOA/chest pain/nausea, reg diet thins, no tele monitor per MD order, possible discharge tomorrow, cleared by rectal surgery for discharged

## 2025-04-15 NOTE — PROGRESS NOTES
Nutrition Services    Patient Name:  Jeanine Guzman  YOB: 1955  MRN: [id number]  Admit Date:  4/9/2025    PROGRESS NOTE      Encounter Information: Follow Up    Rectosigmoid mass - path pending.  Diarrhea.  Ischemic colitis - converted to PO abx.  Urgent fecal incontinence.       PO Diet: Diet: Regular/House; Fluid Consistency: Thin (IDDSI 0)   PO Supplements: n/a   PO Intake:  100%       Current nutrition support: -   Nutrition support review: -       Weight: Weight: 68.9 kg (152 lb) (04/14/25 0703)       Medications: reviewed   Labs: reviewed        GI Function:  diarrhea, hyperactive bowel sounds, last bowel movement: 4/14       Nutrition Intervention Updates: RD to continue to follow.       Results from last 7 days   Lab Units 04/14/25 1951 04/14/25  0545 04/13/25  0622 04/11/25  0524 04/10/25  0430 04/09/25  1710   SODIUM mmol/L  --  136 136 137   < > 139   POTASSIUM mmol/L 4.2 3.4* 3.5 3.9   < > 3.7   CHLORIDE mmol/L  --  105 103 102   < > 102   CO2 mmol/L  --  24.8 23.0 21.3*   < > 25.4   BUN mg/dL  --  12 11 15   < > 26*   CREATININE mg/dL  --  0.69 0.74 0.83   < > 0.79   CALCIUM mg/dL  --  8.9 9.1 9.2   < > 9.4   BILIRUBIN mg/dL  --   --   --  1.8*  --  0.9   ALK PHOS U/L  --   --   --  96  --  130*   ALT (SGPT) U/L  --   --   --  19  --  20   AST (SGOT) U/L  --   --   --  23  --  22   GLUCOSE mg/dL  --  89 98 95   < > 108*    < > = values in this interval not displayed.     Results from last 7 days   Lab Units 04/14/25  0545   HEMOGLOBIN g/dL 10.9*   HEMATOCRIT % 33.6*     SARS-CoV-2, DAVIE   Date Value Ref Range Status   10/31/2024 NEGATIVE Negative Final     Lab Results   Component Value Date    HGBA1C 5.2 02/24/2021       RD to follow up per protocol.    Electronically signed by:  Joselyn Raman RD  04/15/25 13:41 EDT

## 2025-04-16 ENCOUNTER — READMISSION MANAGEMENT (OUTPATIENT)
Dept: CALL CENTER | Facility: HOSPITAL | Age: 70
End: 2025-04-16
Payer: MEDICARE

## 2025-04-16 VITALS
OXYGEN SATURATION: 97 % | SYSTOLIC BLOOD PRESSURE: 124 MMHG | HEIGHT: 64 IN | WEIGHT: 152 LBS | BODY MASS INDEX: 25.95 KG/M2 | DIASTOLIC BLOOD PRESSURE: 62 MMHG | TEMPERATURE: 97.7 F | RESPIRATION RATE: 16 BRPM | HEART RATE: 78 BPM

## 2025-04-16 PROBLEM — K62.89 RECTAL MASS: Status: ACTIVE | Noted: 2025-04-16

## 2025-04-16 LAB
ANION GAP SERPL CALCULATED.3IONS-SCNC: 9.6 MMOL/L (ref 5–15)
BUN SERPL-MCNC: 18 MG/DL (ref 8–23)
BUN/CREAT SERPL: 24.3 (ref 7–25)
CALCIUM SPEC-SCNC: 9.1 MG/DL (ref 8.6–10.5)
CHLORIDE SERPL-SCNC: 108 MMOL/L (ref 98–107)
CO2 SERPL-SCNC: 23.4 MMOL/L (ref 22–29)
CREAT SERPL-MCNC: 0.74 MG/DL (ref 0.57–1)
DEPRECATED RDW RBC AUTO: 39.5 FL (ref 37–54)
EGFRCR SERPLBLD CKD-EPI 2021: 87.7 ML/MIN/1.73
ERYTHROCYTE [DISTWIDTH] IN BLOOD BY AUTOMATED COUNT: 12.9 % (ref 12.3–15.4)
GLUCOSE SERPL-MCNC: 88 MG/DL (ref 65–99)
HCT VFR BLD AUTO: 36 % (ref 34–46.6)
HGB BLD-MCNC: 12 G/DL (ref 12–15.9)
MCH RBC QN AUTO: 27.8 PG (ref 26.6–33)
MCHC RBC AUTO-ENTMCNC: 33.3 G/DL (ref 31.5–35.7)
MCV RBC AUTO: 83.5 FL (ref 79–97)
PLATELET # BLD AUTO: 457 10*3/MM3 (ref 140–450)
PMV BLD AUTO: 8.9 FL (ref 6–12)
POTASSIUM SERPL-SCNC: 4 MMOL/L (ref 3.5–5.2)
RBC # BLD AUTO: 4.31 10*6/MM3 (ref 3.77–5.28)
SODIUM SERPL-SCNC: 141 MMOL/L (ref 136–145)
WBC NRBC COR # BLD AUTO: 6.99 10*3/MM3 (ref 3.4–10.8)

## 2025-04-16 PROCEDURE — 99231 SBSQ HOSP IP/OBS SF/LOW 25: CPT | Performed by: PHYSICIAN ASSISTANT

## 2025-04-16 PROCEDURE — 85027 COMPLETE CBC AUTOMATED: CPT | Performed by: HOSPITALIST

## 2025-04-16 PROCEDURE — 80048 BASIC METABOLIC PNL TOTAL CA: CPT | Performed by: HOSPITALIST

## 2025-04-16 RX ORDER — CALCIUM POLYCARBOPHIL 625 MG
625 TABLET ORAL 2 TIMES DAILY
Qty: 60 TABLET | Refills: 0 | Status: SHIPPED | OUTPATIENT
Start: 2025-04-16

## 2025-04-16 RX ORDER — L.ACID,PARA/B.BIFIDUM/S.THERM 8B CELL
1 CAPSULE ORAL DAILY
Start: 2025-04-17

## 2025-04-16 RX ADMIN — CEFDINIR 300 MG: 300 CAPSULE ORAL at 09:06

## 2025-04-16 RX ADMIN — Medication 10 ML: at 09:07

## 2025-04-16 RX ADMIN — AMLODIPINE BESYLATE 5 MG: 5 TABLET ORAL at 09:07

## 2025-04-16 RX ADMIN — CALCIUM POLYCARBOPHIL 625 MG: 625 TABLET, FILM COATED ORAL at 09:06

## 2025-04-16 RX ADMIN — LISINOPRIL 40 MG: 20 TABLET ORAL at 09:06

## 2025-04-16 RX ADMIN — Medication 1 CAPSULE: at 09:06

## 2025-04-16 RX ADMIN — FAMOTIDINE 20 MG: 20 TABLET, FILM COATED ORAL at 05:54

## 2025-04-16 RX ADMIN — METRONIDAZOLE 500 MG: 500 TABLET ORAL at 05:54

## 2025-04-16 NOTE — CASE MANAGEMENT/SOCIAL WORK
Case Management Discharge Note      Final Note: home no needs         Selected Continued Care - Admitted Since 4/9/2025       Destination    No services have been selected for the patient.                Durable Medical Equipment    No services have been selected for the patient.                Dialysis/Infusion    No services have been selected for the patient.                Home Medical Care    No services have been selected for the patient.                Therapy    No services have been selected for the patient.                Community Resources    No services have been selected for the patient.                Community & DME    No services have been selected for the patient.                    Selected Continued Care - Episodes Includes continued care and service providers with selected services from the active episodes listed below          Transportation Services  Private: Car    Final Discharge Disposition Code: 01 - home or self-care

## 2025-04-16 NOTE — PLAN OF CARE
Goal Outcome Evaluation:            Pt A&Ox4 on RA. Up ad alvaro. D/c education completed at bedside. Pt plans to d/c home with  transporting.

## 2025-04-16 NOTE — DISCHARGE SUMMARY
Patient Name: Jeanine Guzman  : 1955  MRN: [id number]    Date of Admission: 2025  Date of Discharge:  2025  Primary Care Physician: Kori Patel DO      Chief Complaint:   Abdominal Pain      Discharge Diagnoses     Active Hospital Problems    Diagnosis  POA    **Colitis [K52.9]  Yes    Rectal mass [K62.89]  Yes    BRBPR (bright red blood per rectum) [K62.5]  Yes    Diarrhea [R19.7]  Yes      Resolved Hospital Problems   No resolved problems to display.        Hospital Course     Ms. Guzman is a 69 y.o. female with a history of GERD and hypertension who presented to [facility] initially complaining of abdominal pain and rectal bleeding.  Please see the admitting history and physical for further details.  She was found to have possible colitis on imaging and was started on IV antibiotics.  She was initially placed in the ED observation unit and was seen in consultation by gastroenterology.  Colonoscopy was performed which did reveal colitis which looked ischemic in nature as well as a sigmoid colon mass.  Stool studies were positive for C. difficile toxin but not for antigen.  Initially oral vancomycin was started but given lack of pseudomembranes on endoscopy, the vancomycin was discontinued.  She has completed a full antibacterial course and biopsies of the colitis look to be consistent with ischemia.  She did experience continued diarrhea which has slowly but surely improved especially with the addition of fiber.  She is tolerating an oral diet now.  She was seen by colorectal surgery who is planning outpatient follow-up to set up potential surgical intervention in regards to the colon mass as well as a CT of the abdomen and pelvis.  Pathology came back just prior to discharge showing it to be an invasive adenocarcinoma.  Additional workup completed here for staging included CT of the chest which showed no obvious metastases, only a subcentimeter nodular focus along the  proximal posterior tracheal membrane with recommendation for follow-up chest CT in 2 to 3 months.  Cardiology saw her as well due to some atypical chest pain and performed a stress test which was negative as well as an echocardiogram.          Day of Discharge     Subjective:  No events.  She is doing well    Physical Exam:  Temp:  [97.7 °F (36.5 °C)-98.2 °F (36.8 °C)] 97.7 °F (36.5 °C)  Heart Rate:  [65-78] 78  Resp:  [16] 16  BP: (124-143)/(53-62) 124/62  Body mass index is 26.09 kg/m².  Physical Exam  Vitals reviewed.   Constitutional:       General: She is not in acute distress.     Appearance: She is not ill-appearing.   Cardiovascular:      Rate and Rhythm: Normal rate and regular rhythm.   Pulmonary:      Effort: No respiratory distress.      Breath sounds: Normal breath sounds.   Abdominal:      General: There is no distension.      Palpations: Abdomen is soft.      Tenderness: There is no abdominal tenderness.   Musculoskeletal:      Right lower leg: No edema.      Left lower leg: No edema.   Skin:     General: Skin is warm and dry.   Neurological:      Mental Status: She is alert.   Psychiatric:         Mood and Affect: Mood normal.         Consultants     Consult Orders (all) (From admission, onward)       Start     Ordered    04/14/25 1825  Inpatient Case Management  Consult  Once        Provider:  (Not yet assigned)    04/14/25 1824    04/12/25 1112  Inpatient Cardiology Consult  Once        Specialty:  Cardiology  Provider:  Jeni Cifuentes MD    04/12/25 1112    04/11/25 1334  Inpatient Colorectal Surgery Consult  Once        Specialty:  Colon and Rectal Surgery  Provider:  Marcos Rowe MD    04/11/25 1338    04/11/25 1155  Inpatient Hospitalist Consult  Once        Specialty:  Hospitalist  Provider:  Shay Uribe MD    04/11/25 1155    04/11/25 1154  Inpatient General Surgery Consult  Once        Specialty:  General Surgery  Provider:  Malcolm Fuentes MD     04/11/25 1155    04/10/25 0702  Inpatient Gastroenterology Consult  IN         Specialty:  Gastroenterology  Provider:  (Not yet assigned)    04/09/25 2106                  Procedures     COLONOSCOPY TO CECUM WITH BIOPSY AND SPOT INK MARKER; POLYPECTOMY ( COLD SNARE)    Imaging Results (All)       Procedure Component Value Units Date/Time    CT Chest With Contrast Diagnostic [[id number]] Collected: 04/14/25 1316     Updated: 04/14/25 1336    Narrative:      CT CHEST WITH IV CONTRAST     HISTORY: Rectal mass with pending pathology. Staging.     TECHNIQUE: Radiation dose reduction techniques were utilized, including  automated exposure control and exposure modulation based on body size.   3 mm images were obtained through the chest after the administration of  IV contrast.     COMPARISON: CT abdomen pelvis 4/9/2025.        FINDINGS: A 1 cm arterially enhancing splenic lesion. Lesion was also  hyperenhancing on recent venous phase CT (series 4/image 232). Partially  visualized right hepatic fluid density cyst.     Heart is normal in size. Small volume pericardial fluid. Nondilated main  pulmonary artery. Nonaneurysmal thoracic aorta. No mediastinal/hilar  lymphadenopathy. Decompressed esophagus.     Subcentimeter nodular focus along the proximal posterior tracheal  membrane (series 5/image 51). Trachea and main bronchi are patent. No  emphysematous changes. Dependent bibasilar subsegmental likely  atelectasis. Right middle lobe calcified granuloma. No pleural effusion.             Impression:      1. No CT evidence of metastatic disease within the chest.  2. A subcentimeter nodular focus along the proximal posterior tracheal  membrane could represent a tiny focus of aspiration but recommend a  follow-up chest CT in 2 to 3 months to ensure clearance and ensure this  does not represent a small tracheal nodule.  3. A 1 cm arterial enhancing splenic lesion is statistically, most  likely benign. Attention on follow-up.         This report was finalized on 4/14/2025 1:33 PM by Dr. Freddie Basurto M.D on Workstation: [id number]       XR Chest 1 View [[id number]] Collected: 04/12/25 1128     Updated: 04/12/25 1132    Narrative:      ONE-VIEW PORTABLE CHEST     HISTORY: Chest pain.     FINDINGS: The lungs are well expanded and clear. The heart is borderline  enlarged and there is no acute disease or change from 12/30/2018.     This report was finalized on 4/12/2025 11:29 AM by Dr. Brannon Wylie M.D on Workstation: [id number]       CT Abdomen Pelvis With Contrast [[id number]] Collected: 04/09/25 1957     Updated: 04/09/25 2015    Narrative:      CT ABDOMEN AND PELVIS WITH IV CONTRAST     HISTORY: Abdominal pain, vomiting, diarrhea and bright red blood per  rectum.     TECHNIQUE: Radiation dose reduction techniques were utilized, including  automated exposure control and exposure modulation based on body size.   3 mm images were obtained through the abdomen and pelvis after the  administration of IV contrast.     COMPARISON: None available        FINDINGS: Left renal cyst. Right hepatic cyst. Subcentimeter hypodense  left hepatic lesion too small to characterize. Confluent moderate  circumferential wall thickening extending from the distal transverse  colon throughout the descending colon (series 2/image 71). More mild  circumferential wall thickening throughout the remaining more proximal  transverse colon. Prominence of the associated vasa recti with minimal  pericolonic inflammatory changes. Sigmoid colon diverticulosis. More  isolated short segment distal sigmoid colon suspected masslike wall  thickening (series 2/image 107). Decompressed state of the colon does  limit evaluation. Appendectomy. Patent mesenteric vasculature. No  pneumatosis, portal venous gas or free intraperitoneal air.  Hysterectomy. Remaining solid organs and bowel are normal. No abdominal  pelvic lymphadenopathy. No focal osseous abnormality.              Impression:      1. Moderate colitis involving the descending colon and to a lesser  extent, the transverse colon.  2. More isolated short segment distal sigmoid colon suspected masslike  wall thickening. This needs a follow-up colonoscopy to exclude an  underlying lesion. Decompressed state of the colon does limit  evaluation.  3. Sigmoid colon diverticulosis.     This report was finalized on 4/9/2025 8:12 PM by Dr. Freddie Basurto M.D on Workstation: [id number]               Results for orders placed during the hospital encounter of 04/09/25    Adult Transthoracic Echo Complete W/ Cont if Necessary Per Protocol    Interpretation Summary    Left ventricular systolic function is normal. Left ventricular ejection fraction appears to be 61 - 65%.    Left ventricular wall thickness is consistent with concentric hypertrophy. Sigmoid-shaped ventricular septum is present.    Left ventricular diastolic function is consistent with (grade Ia w/high LAP) impaired relaxation.    Normal right ventricular cavity size and systolic function noted.    The left atrial cavity is mildly dilated.    Calculated right ventricular systolic pressure from tricuspid regurgitation is 20 mmHg.    There is no evidence of pericardial effusion.    Pertinent Labs     Results from last 7 days   Lab Units 04/16/25  0553 04/14/25  0545 04/13/25  0623 04/12/25  1102   WBC 10*3/mm3 6.99 6.62 8.20 12.53*   HEMOGLOBIN g/dL 12.0 10.9* 12.0 11.8*   PLATELETS 10*3/mm3 457* 409 427 370     Results from last 7 days   Lab Units 04/16/25  0553 04/14/25  1951 04/14/25  0545 04/13/25  0622 04/11/25  0524   SODIUM mmol/L 141  --  136 136 137   POTASSIUM mmol/L 4.0 4.2 3.4* 3.5 3.9   CHLORIDE mmol/L 108*  --  105 103 102   CO2 mmol/L 23.4  --  24.8 23.0 21.3*   BUN mg/dL 18  --  12 11 15   CREATININE mg/dL 0.74  --  0.69 0.74 0.83   GLUCOSE mg/dL 88  --  89 98 95   EGFR mL/min/1.73 87.7  --  94.1 87.7 76.4     Results from last 7 days   Lab Units 04/11/25  0524  "04/09/25  1710   ALBUMIN g/dL 4.1 4.7   BILIRUBIN mg/dL 1.8* 0.9   ALK PHOS U/L 96 130*   AST (SGOT) U/L 23 22   ALT (SGPT) U/L 19 20     Results from last 7 days   Lab Units 04/16/25  0553 04/14/25  0545 04/13/25  0622 04/11/25  0524 04/10/25  0430 04/09/25  1710   CALCIUM mg/dL 9.1 8.9 9.1 9.2   < > 9.4   ALBUMIN g/dL  --   --   --  4.1  --  4.7    < > = values in this interval not displayed.     Results from last 7 days   Lab Units 04/09/25  1710   LIPASE U/L 13     Results from last 7 days   Lab Units 04/12/25  1627 04/12/25  1055   HSTROP T ng/L 6 8           Invalid input(s): \"LDLCALC\"  Results from last 7 days   Lab Units 04/09/25  2103 04/09/25  2055   BLOODCX  No growth at 5 days No growth at 5 days         Test Results Pending at Discharge     Pending Results       None              Discharge Details        Discharge Medications        New Medications        Instructions Start Date   lactobacillus acidophilus capsule capsule   1 capsule, Oral, Daily   Start Date: April 17, 2025     polycarbophil 625 MG tablet tablet   625 mg, Oral, 2 Times Daily             Continue These Medications        Instructions Start Date   amLODIPine 5 MG tablet  Commonly known as: NORVASC   5 mg, Oral, Daily      benazepril 40 MG tablet  Commonly known as: LOTENSIN   40 mg, Oral, Daily      esomeprazole 40 MG capsule  Commonly known as: nexIUM   40 mg, Oral, Every Morning Before Breakfast      hyoscyamine 0.125 MG tablet  Commonly known as: ANASPAZ,LEVSIN   0.125 mg, Oral, Every 6 Hours PRN      travoprost (MARLI free) 0.004 % solution ophthalmic solution  Commonly known as: TRAVATAN   0.004 drops, Daily      trimethoprim 100 MG tablet  Commonly known as: TRIMPEX   100 mg, Oral, Daily               Allergies   Allergen Reactions    Latex Other (See Comments) and Hives     Serious rash    Morphine Unknown (See Comments) and Other (See Comments)     shaking    Sulfa Antibiotics Rash       Discharge Disposition:  Home or Self " Care      Discharge Diet:  Diet Order   Procedures    Diet: Regular/House; Fluid Consistency: Thin (IDDSI 0)       Discharge Activity:       CODE STATUS:    Code Status and Medical Interventions: CPR (Attempt to Resuscitate); Full Support   Ordered at: 04/09/25 2106     Code Status (Patient has no pulse and is not breathing):    CPR (Attempt to Resuscitate)     Medical Interventions (Patient has pulse or is breathing):    Full Support     Level Of Support Discussed With:    Patient       Future Appointments   Date Time Provider Department Center   4/24/2025  9:00 AM Kori Patel DO [facility]   4/29/2025  1:00 PM [facility] CT 1 [facility]   5/5/2025  2:00 PM Marcos Rowe MD [facility]  [facility]      Follow-up Information       Kori Patel DO Follow up in 1 week(s).    Specialty: Family Medicine  Contact information:  [address]  [address] [address] [phone]                             Time Spent on Discharge:  Greater than 30 minutes      Freddie Eric MD  [facility]  04/16/25  16:48 EDT

## 2025-04-16 NOTE — PROGRESS NOTES
Colorectal Surgery Progress Note    S: BMs have slowed; just had one BM overnight. Abdominal discomfort 1/10 in severity. Tolerating soft foods. Urinating without difficulty.    O:  Temp:  [97.7 °F (36.5 °C)-98.3 °F (36.8 °C)] 97.7 °F (36.5 °C)  Heart Rate:  [65-78] 78  Resp:  [16] 16  BP: (124-150)/(53-65) 124/62    Intake/Output Summary (Last 24 hours) at 4/16/2025 0850  Last data filed at 4/15/2025 1756  Gross per 24 hour   Intake 360 ml   Output --   Net 360 ml     Abd: soft, non distended.     Results from last 7 days   Lab Units 04/16/25  0553   WBC 10*3/mm3 6.99   HEMOGLOBIN g/dL 12.0   HEMATOCRIT % 36.0   PLATELETS 10*3/mm3 457*     Results from last 7 days   Lab Units 04/16/25  0553   SODIUM mmol/L 141   POTASSIUM mmol/L 4.0   CHLORIDE mmol/L 108*   CO2 mmol/L 23.4   BUN mg/dL 18   CREATININE mg/dL 0.74   EGFR mL/min/1.73 87.7   GLUCOSE mg/dL 88   CALCIUM mg/dL 9.1         A/P: 69 y.o. female with rectal mass and ischemic colitis   Patient should go home on fiber supplement. Discussed with her that she can adjust the dose as it affects her BMs.   Rectal mass pathology still pending   Follow up in office with Dr. Rowe on 5/5/25--appointment has been made. CT abdomen/pelvis appointment has also been made.   Stable for discharge from [facility] perspective

## 2025-04-17 ENCOUNTER — TELEPHONE (OUTPATIENT)
Dept: SURGERY | Facility: CLINIC | Age: 70
End: 2025-04-17
Payer: MEDICARE

## 2025-04-17 ENCOUNTER — TRANSITIONAL CARE MANAGEMENT TELEPHONE ENCOUNTER (OUTPATIENT)
Dept: CALL CENTER | Facility: HOSPITAL | Age: 70
End: 2025-04-17
Payer: MEDICARE

## 2025-04-17 NOTE — OUTREACH NOTE
Prep Survey      Flowsheet Row Responses   [facility] patient discharged from? [place]   Is LACE score < 7 ? No   Eligibility [facility]   Discharge Disposition Home or Self Care   Discharge diagnosis Colitis   Does the patient have one of the following disease processes/diagnoses(primary or secondary)? Other   Does the patient have Home health ordered? No   Is there a DME ordered? No   Prep survey completed? Yes            ALONSO SAEZ - Registered Nurse

## 2025-04-17 NOTE — OUTREACH NOTE
Call Center TCM Note      Flowsheet Row Responses   [facility] patient discharged from? [place]   Does the patient have one of the following disease processes/diagnoses(primary or secondary)? Other   TCM attempt successful? Yes   Call start time 0900   Call end time 0913   Meds reviewed with patient/caregiver? Yes   Is the patient having any side effects they believe may be caused by any medication additions or changes? No   Does the patient have all medications ordered at discharge? Yes   Is the patient taking all medications as directed (includes completed medication regime)? Yes   Comments PCP hospital f/u appt on 04/24/25. CT abdomen/pelvis on 04/29/25. Colorectal surgeon appt 05/05/25.   Does the patient have an appointment with their PCP within 7-14 days of discharge? Yes   Has home health visited the patient within 72 hours of discharge? N/A   Psychosocial issues? No   Did the patient receive a copy of their discharge instructions? Yes   Nursing interventions Reviewed instructions with patient   What is the patient's perception of their health status since discharge? Returned to baseline/stable   Is the patient/caregiver able to teach back signs and symptoms related to disease process for when to call PCP? Yes   Is the patient/caregiver able to teach back signs and symptoms related to disease process for when to call 911? Yes   Is the patient/caregiver able to teach back the hierarchy of who to call/visit for symptoms/problems? PCP, Specialist, Home health nurse, Urgent Care, ED, 911 Yes   If the patient is a current smoker, are they able to teach back resources for cessation? Not a smoker   Additional teach back comments Reviewed s/s of worsening colitis.   TCM call completed? Yes   Wrap up additional comments Patient states is stable with no further rectal bleeding noted. Denies any diarrhea yet today. States eating small amounts of bland foods. Denies any s/s of worsening colitis. Patient asking  about biopsy results. Encouraged to review in MyChart or discuss with her PCP. Denies any needs today. TCM complete.   Call end time 0913   Would this patient benefit from a Referral to [facility] Social Work? No   Is the patient interested in additional calls from an ambulatory ? No            Stacia MOREL - Registered Nurse    4/17/2025, 09:14 EDT

## 2025-04-18 LAB
CYTO UR: NORMAL
LAB AP CASE REPORT: NORMAL
LAB AP DIAGNOSIS COMMENT: NORMAL
LAB AP SPECIAL STAINS: NORMAL
PATH REPORT.ADDENDUM SPEC: NORMAL
PATH REPORT.FINAL DX SPEC: NORMAL
PATH REPORT.GROSS SPEC: NORMAL

## 2025-04-24 ENCOUNTER — OFFICE VISIT (OUTPATIENT)
Dept: FAMILY MEDICINE CLINIC | Facility: CLINIC | Age: 70
End: 2025-04-24
Payer: MEDICARE

## 2025-04-24 VITALS
BODY MASS INDEX: 25.81 KG/M2 | WEIGHT: 151.2 LBS | SYSTOLIC BLOOD PRESSURE: 139 MMHG | OXYGEN SATURATION: 97 % | DIASTOLIC BLOOD PRESSURE: 78 MMHG | HEART RATE: 80 BPM | HEIGHT: 64 IN

## 2025-04-24 DIAGNOSIS — K52.9 COLITIS: Primary | ICD-10-CM

## 2025-04-24 DIAGNOSIS — I10 PRIMARY HYPERTENSION: ICD-10-CM

## 2025-04-24 DIAGNOSIS — C18.7 ADENOCARCINOMA OF SIGMOID COLON: ICD-10-CM

## 2025-04-24 LAB
CYTO UR: NORMAL
LAB AP CASE REPORT: NORMAL
LAB AP DIAGNOSIS COMMENT: NORMAL
LAB AP SPECIAL STAINS: NORMAL
Lab: NORMAL
PATH REPORT.ADDENDUM SPEC: NORMAL
PATH REPORT.FINAL DX SPEC: NORMAL
PATH REPORT.GROSS SPEC: NORMAL

## 2025-04-24 NOTE — PROGRESS NOTES
Transitional Care Follow Up Visit  Subjective     Jeanine Guzman is a 69 y.o. female who presents for a transitional care management visit.    Within 48 business hours after discharge our office contacted her via telephone to coordinate her care and needs.      I reviewed and discussed the details of that call along with the discharge summary, hospital problems, inpatient lab results, inpatient diagnostic studies, and consultation reports with Jeanine.     Current outpatient and discharge medications have been reconciled for the patient.  Reviewed by: Kori Patel DO          4/16/2025     8:00 PM   Date of TCM Phone Call   [facility]   Discharge Disposition Home or Self Care     Risk for Readmission (LACE) Score: 8 (4/16/2025  6:00 AM)      History of Present Illness   Course During Hospital Stay: Please see hospital notes below:  ED to Hosp-Admission (Discharged) with Freddie Eric MD; Travis Jernigan II, MD (04/09/2025)   Basic Metabolic Panel (04/16/2025 05:53)  CBC (No Diff) (04/16/2025 05:53)   The following portions of the patient's history were reviewed and updated as appropriate: allergies, current medications, past family history, past medical history, past social history, past surgical history, and problem list.    Review of Systems   Constitutional:  Negative for activity change, appetite change, fatigue and unexpected weight change.   Respiratory:  Negative for cough, shortness of breath and wheezing.    Cardiovascular:  Negative for chest pain, palpitations and leg swelling.   Gastrointestinal:  Positive for blood in stool (Very minimal) and diarrhea. Negative for abdominal pain and nausea.   Genitourinary:  Negative for dysuria, frequency and urgency.   Musculoskeletal:  Negative for arthralgias, joint swelling and myalgias.   Allergic/Immunologic: Negative for environmental allergies, food allergies and immunocompromised state.   Neurological:   "Negative for dizziness, weakness and headaches.   Hematological:  Negative for adenopathy. Does not bruise/bleed easily.   Psychiatric/Behavioral:  Negative for confusion, dysphoric mood and sleep disturbance. The patient is not nervous/anxious.        Objective   /78   Pulse 80   Ht 162.6 cm (64\")   Wt 68.6 kg (151 lb 3.2 oz)   SpO2 97%   BMI 25.95 kg/m²   Physical Exam  Vitals and nursing note reviewed.   Constitutional:       Appearance: Normal appearance. She is well-developed and normal weight.   HENT:      Head: Normocephalic and atraumatic.   Eyes:      General: No scleral icterus.     Conjunctiva/sclera: Conjunctivae normal.   Cardiovascular:      Rate and Rhythm: Normal rate and regular rhythm.      Heart sounds: Normal heart sounds.   Pulmonary:      Effort: Pulmonary effort is normal.      Breath sounds: Normal breath sounds.   Abdominal:      General: Bowel sounds are normal.      Palpations: Abdomen is soft.   Musculoskeletal:         General: Normal range of motion.      Cervical back: Normal range of motion and neck supple.   Skin:     General: Skin is warm and dry.      Capillary Refill: Capillary refill takes less than 2 seconds.      Findings: No rash.   Neurological:      Mental Status: She is alert and oriented to person, place, and time.   Psychiatric:         Mood and Affect: Mood normal.         Behavior: Behavior normal.         Thought Content: Thought content normal.         Judgment: Judgment normal.         Assessment & Plan   Diagnoses and all orders for this visit:    1. Colitis (Primary)    2. Adenocarcinoma of sigmoid colon    3. Primary hypertension      Patient is here today to follow-up from her recent hospitalization for a lower GI bleed.  The findings during her hospitalization was of colitis thought to be ischemic and also a sigmoid mass found during colonoscopy and the pathology returned as invasive adenocarcinoma.  Patient has follow-up with Dr. Rowe scheduled for " further evaluation and treatment of the colon cancer.  She is continuing to have some minimal blood in her stools intermittently but overall her symptoms are better currently.  She has been warned that if she has any more acute bleeding that she needs to go to the ER immediately.  CBC and BMP were both stable at hospital discharge.  Patient did not require a transfusion during her hospitalization and her hemoglobin was 12.0 upon discharge.  All vitals are normal in the office.  Advised patient to keep close follow-up as planned next week.

## 2025-04-25 ENCOUNTER — TELEPHONE (OUTPATIENT)
Dept: SURGERY | Facility: CLINIC | Age: 70
End: 2025-04-25
Payer: MEDICARE

## 2025-04-25 NOTE — TELEPHONE ENCOUNTER
Pt called to inform she took a different probiotic and she believes it caused her to have diarrhea, she states she took 2 fiber pills and will switch back to the original probiotic. Wanted office to be aware of this and states she has a yellowy bile like discharge in with her bms. Pt would like a call back from office today or Monday confirming PA is aware and this this is okay.  No other symptoms.

## 2025-04-29 ENCOUNTER — HOSPITAL ENCOUNTER (OUTPATIENT)
Dept: CT IMAGING | Facility: HOSPITAL | Age: 70
Discharge: HOME | End: 2025-04-29
Admitting: PHYSICIAN ASSISTANT
Payer: MEDICARE

## 2025-04-29 ENCOUNTER — READMISSION MANAGEMENT (OUTPATIENT)
Dept: CALL CENTER | Facility: HOSPITAL | Age: 70
End: 2025-04-29
Payer: MEDICARE

## 2025-04-29 DIAGNOSIS — K62.89 RECTAL MASS: ICD-10-CM

## 2025-04-29 PROCEDURE — 74177 CT ABD & PELVIS W/CONTRAST: CPT

## 2025-04-29 PROCEDURE — 25510000002 DIATRIZOATE MEGLUMINE & SODIUM PER 1 ML: Performed by: PHYSICIAN ASSISTANT

## 2025-04-29 RX ORDER — DIATRIZOATE MEGLUMINE AND DIATRIZOATE SODIUM 660; 100 MG/ML; MG/ML
30 SOLUTION ORAL; RECTAL
Status: COMPLETED | OUTPATIENT
Start: 2025-04-29 | End: 2025-04-29

## 2025-04-29 RX ADMIN — DIATRIZOATE MEGLUMINE AND DIATRIZOATE SODIUM 30 ML: 660; 100 LIQUID ORAL; RECTAL at 12:10

## 2025-04-29 RX ADMIN — DIATRIZOATE MEGLUMINE AND DIATRIZOATE SODIUM 30 ML: 660; 100 LIQUID ORAL; RECTAL at 13:22

## 2025-04-29 NOTE — OUTREACH NOTE
Medical Week 2 Survey      Flowsheet Row Responses   [facility] patient discharged from? [place]   Does the patient have one of the following disease processes/diagnoses(primary or secondary)? Other   Week 2 attempt successful? No   Unsuccessful attempts Attempt 1   oke Eva Oden Registered Nurse

## 2025-05-01 ENCOUNTER — TELEPHONE (OUTPATIENT)
Dept: FAMILY MEDICINE CLINIC | Facility: CLINIC | Age: 70
End: 2025-05-01
Payer: MEDICARE

## 2025-05-01 DIAGNOSIS — K21.9 GASTROESOPHAGEAL REFLUX DISEASE WITHOUT ESOPHAGITIS: ICD-10-CM

## 2025-05-01 RX ORDER — ESOMEPRAZOLE MAGNESIUM 40 MG/1
40 CAPSULE, DELAYED RELEASE ORAL
Qty: 90 CAPSULE | Refills: 1 | Status: SHIPPED | OUTPATIENT
Start: 2025-05-01

## 2025-05-01 NOTE — TELEPHONE ENCOUNTER
Caller: GuzmanJeanine    Relationship: Self    Best call back number:   Telephone Information:   Mobile [phone]     Requested Prescriptions:   Requested Prescriptions     Pending Prescriptions Disp Refills    esomeprazole (nexIUM) 40 MG capsule 90 capsule 1     Sig: Take 1 capsule by mouth Every Morning Before Breakfast.        Pharmacy where request should be sent[place] [facility] [place] [address] [phone] [facility] [phone] [facility]     Last office visit with prescribing clinician: 4/24/2025   Last telemedicine visit with prescribing clinician: Visit date not found   Next office visit with prescribing clinician: Visit date not found     Additional details provided by patient:     Does the patient have less than a 3 day supply:  [] Yes  [] No    Would you like a call back once the refill request has been completed: [] Yes [x] No    If the office needs to give you a call back, can they leave a voicemail: [] Yes [x] No    Alexx Mcfarland Rep   05/01/25 08:19 EDT

## 2025-05-05 ENCOUNTER — PREP FOR SURGERY (OUTPATIENT)
Dept: OTHER | Facility: HOSPITAL | Age: 70
End: 2025-05-05
Payer: MEDICARE

## 2025-05-05 ENCOUNTER — OFFICE VISIT (OUTPATIENT)
Dept: SURGERY | Facility: CLINIC | Age: 70
End: 2025-05-05
Payer: MEDICARE

## 2025-05-05 VITALS
HEIGHT: 64 IN | DIASTOLIC BLOOD PRESSURE: 60 MMHG | BODY MASS INDEX: 26.36 KG/M2 | OXYGEN SATURATION: 97 % | HEART RATE: 71 BPM | SYSTOLIC BLOOD PRESSURE: 140 MMHG | WEIGHT: 154.4 LBS

## 2025-05-05 DIAGNOSIS — C18.7 CANCER OF SIGMOID: ICD-10-CM

## 2025-05-05 DIAGNOSIS — C18.7 CANCER OF SIGMOID: Primary | ICD-10-CM

## 2025-05-05 DIAGNOSIS — K62.89 RECTAL MASS: Primary | ICD-10-CM

## 2025-05-05 RX ORDER — CELECOXIB 200 MG/1
200 CAPSULE ORAL ONCE
OUTPATIENT
Start: 2025-05-13 | End: 2025-05-05

## 2025-05-05 RX ORDER — METRONIDAZOLE 500 MG/100ML
500 INJECTION, SOLUTION INTRAVENOUS ONCE
OUTPATIENT
Start: 2025-05-13 | End: 2025-05-05

## 2025-05-05 RX ORDER — GABAPENTIN 300 MG/1
300 CAPSULE ORAL ONCE
OUTPATIENT
Start: 2025-05-13 | End: 2025-05-05

## 2025-05-05 RX ORDER — CHLORHEXIDINE GLUCONATE 40 MG/ML
1 SOLUTION TOPICAL 2 TIMES DAILY
Qty: 236 ML | Refills: 0 | Status: SHIPPED | OUTPATIENT
Start: 2025-05-05

## 2025-05-05 RX ORDER — ALVIMOPAN 12 MG/1
12 CAPSULE ORAL ONCE
OUTPATIENT
Start: 2025-05-13 | End: 2025-05-05

## 2025-05-05 RX ORDER — ACETAMINOPHEN 500 MG
1000 TABLET ORAL ONCE
OUTPATIENT
Start: 2025-05-13 | End: 2025-05-05

## 2025-05-05 RX ORDER — SCOPOLAMINE 1 MG/3D
1 PATCH, EXTENDED RELEASE TRANSDERMAL CONTINUOUS
OUTPATIENT
Start: 2025-05-13 | End: 2025-05-16

## 2025-05-05 NOTE — H&P (VIEW-ONLY)
Jeanine Guzman is a 69 y.o. female in for follow up of Cancer of sigmoid    History of Present Illness  The patient is a 69-year-old female who was admitted a few weeks ago with what ended up being a sigmoid mass and ischemic colitis. She was treated with antibiotics and discharged.    She reports feeling well overall but experiences occasional fatigue. She expresses concern about the potential need for an ostomy bag post-surgery and queries about the possibility of using sutures instead of staples. She also questions the likelihood of staple dislodgement. She seeks clarification on the differences between a colostomy and an ileostomy and inquires about the probability of requiring an ileostomy. She recalls a previous colonoscopy performed by Dr. Glass a few years ago, during which precancerous cells were identified, and a non-malignant polyp was found. She expresses interest in home health care post-discharge due to her inability to manage her condition at home. She is on Medicare and has a supplement. She inquires about the necessity of covering the ostomy bag during showers and whether she can shower immediately upon returning home. She also questions the stage of her tumor and the potential need for chemotherapy. She expresses concern about the side effects of chemotherapy, including nausea and hair loss.     Past Medical History:   Diagnosis Date    Allergic     Alergic to Morphine & latex    Anxiety     Colon cancer 04/2025    Colon polyp 11/11/2022    Diverticulitis of colon 2010    Diverticulosis 2010    GERD (gastroesophageal reflux disease) 2016    Glaucoma 2018    Headache     Hyperlipidemia October 2022    Hypertension     Inflammatory bowel disease 04/11/25    Ischemic colitis    Irritable bowel syndrome 2017    PONV (postoperative nausea and vomiting) 1990    Rectal bleeding 2024     Past Surgical History:   Procedure Laterality Date    APPENDECTOMY  1958    COLONOSCOPY  11/11/22    COLONOSCOPY N/A  "04/11/2025    Procedure: COLONOSCOPY TO CECUM WITH BIOPSY AND SPOT INK MARKER; POLYPECTOMY ( COLD SNARE);  Surgeon: Juan Jose Horner MD;  Location: [facility] ENDOSCOPY;  Service: Gastroenterology;  Laterality: N/A;  COLITIS; DIARRHEA  POST:HEMORRHOIDS; DIVERTICULOSIS; SIGMOID MASS; COLITIS; COLON POLYP    EYE SURGERY  2019    Cataracts    FRACTURE SURGERY  2010    Broken wrist    HYSTERECTOMY      WRIST SURGERY Left 2010       /60 (BP Location: Left arm, Patient Position: Sitting, Cuff Size: Adult)   Pulse 71   Ht 162.6 cm (64\")   Wt 70 kg (154 lb 6.4 oz)   LMP  (LMP Unknown)   SpO2 97%   Breastfeeding No   BMI 26.50 kg/m²   Body mass index is 26.5 kg/m².      PE:   Physical Exam    Physical Exam  Constitutional:       General: She is not in acute distress.     Appearance: She is well-developed.   HENT:      Head: Normocephalic and atraumatic.   Abdominal:      General: There is no distension.      Palpations: Abdomen is soft.   Musculoskeletal:         General: Normal range of motion.   Neurological:      Mental Status: She is alert.   Psychiatric:         Thought Content: Thought content normal.             Assessment & Plan  1. Sigmoid mass.  The CT scan done on 04/29/2025 shows a mass at the rectosigmoid and some suspicious lymph nodes, but no metastatic disease. The patient was previously treated with antibiotics for ischemic colitis, which has resolved. A detailed discussion was held regarding the surgical procedure, including the removal of the tumor and surrounding lymph nodes, the use of staples versus sutures, and the potential need for a temporary ileostomy. The surgery, known as a low anterior resection, typically lasts 4 to 5 hours, with a hospital stay of 2 to 5 days. Postoperative care includes walking four times daily, sitting upright in a chair for most of the day, and avoiding forceful eating. Pain management will involve a transabdominal pain block and a combination of Tylenol, " Celebrex, and gabapentin. The patient was advised to avoid driving and lifting more than 15 pounds post-surgery. A prescription for an antibiotic will be provided to take with the pre-surgery prep. The patient was informed about the possibility of needing chemotherapy post-surgery, depending on the lymph node status.         1. Cancer of sigmoid           Patient or patient representative verbalized consent for the use of Ambient Listening during the visit with  Marcos Rowe MD for chart documentation. 5/9/2025  16:41 EDT

## 2025-05-06 ENCOUNTER — TELEPHONE (OUTPATIENT)
Dept: FAMILY MEDICINE CLINIC | Facility: CLINIC | Age: 70
End: 2025-05-06
Payer: MEDICARE

## 2025-05-06 NOTE — TELEPHONE ENCOUNTER
Esomeprazole 40mg is not on the pts formulary.  I went thru her chart and do not see any other medications on the formulary list she has tried.  Omeprazole 10mg, 20mg or 40mg capsule, Pantoprazole sodium tablet or Rabeprazole sodium.  Please advise

## 2025-05-06 NOTE — TELEPHONE ENCOUNTER
Spoke with pt and she pays out of pocket for the prescription and wants to stay on it.  Insurance does not cover it

## 2025-05-06 NOTE — TELEPHONE ENCOUNTER
PA and APPEAL denied          Talk to your prescriber to see if the following covered alternative(s) would be right for you:   Lansoprazole 15mg (quantity limit 30/30 days)  lamsoprazole 30mg (quantity limit 42/30 days)  Pantoprazole Sodium tablet 20mg (quantity limit 30/30 days)  Pantoprazole Tablet 40 mg (quantity limit 60/30 days)  Rabeprazole sodium (quantity limit 30/30 days)  Omeprazole (quantity limit 60/30 days)  Omeprazole DR (quantity limit 30/30 days)

## 2025-05-08 ENCOUNTER — PATIENT OUTREACH (OUTPATIENT)
Dept: CASE MANAGEMENT | Facility: OTHER | Age: 70
End: 2025-05-08
Payer: MEDICARE

## 2025-05-08 NOTE — OUTREACH NOTE
AMBULATORY CASE MANAGEMENT NOTE    Names and Relationships of Patient/Support Persons: Contact: Jeanine Guzman; Relationship: Self -     Patient Outreach      REASON FOR FOLLOW UP:  Recent hospitalization    INTRODUCTION: Introduced self and role of ACM.        Patient Report:  She is doing well. She is anxious regarding her upcoming surgery and what to expect post op. Questions regarding home health and choices. She has a nurse that lives across the street and not sure she will need a nurse coming to her home. Explained physician protocol, Wound ostomy care and experience with stoma's. Reinforced need for home health and following Dr Rowe's protocols. Long enjoyable conversation and offered to call back post operatively. She does not feel this is needed at this time.       Review of Instructions:    Next Steps Follow ups:        Kori FRAZIER  Ambulatory Case Management    5/8/2025, 13:07 EDT

## 2025-05-09 ENCOUNTER — PRE-ADMISSION TESTING (OUTPATIENT)
Dept: PREADMISSION TESTING | Facility: HOSPITAL | Age: 70
End: 2025-05-09
Payer: MEDICARE

## 2025-05-09 VITALS
TEMPERATURE: 98.1 F | HEIGHT: 62 IN | DIASTOLIC BLOOD PRESSURE: 66 MMHG | WEIGHT: 144.9 LBS | BODY MASS INDEX: 26.67 KG/M2 | RESPIRATION RATE: 16 BRPM | OXYGEN SATURATION: 96 % | HEART RATE: 62 BPM | SYSTOLIC BLOOD PRESSURE: 129 MMHG

## 2025-05-09 LAB
ABO GROUP BLD: NORMAL
ANION GAP SERPL CALCULATED.3IONS-SCNC: 12.1 MMOL/L (ref 5–15)
BLD GP AB SCN SERPL QL: NEGATIVE
BUN SERPL-MCNC: 35 MG/DL (ref 8–23)
BUN/CREAT SERPL: 40.7 (ref 7–25)
CALCIUM SPEC-SCNC: 9.7 MG/DL (ref 8.6–10.5)
CEA SERPL-MCNC: 1.37 NG/ML
CHLORIDE SERPL-SCNC: 102 MMOL/L (ref 98–107)
CO2 SERPL-SCNC: 23.9 MMOL/L (ref 22–29)
CREAT SERPL-MCNC: 0.86 MG/DL (ref 0.57–1)
DEPRECATED RDW RBC AUTO: 40.5 FL (ref 37–54)
EGFRCR SERPLBLD CKD-EPI 2021: 73.2 ML/MIN/1.73
ERYTHROCYTE [DISTWIDTH] IN BLOOD BY AUTOMATED COUNT: 13.1 % (ref 12.3–15.4)
GLUCOSE SERPL-MCNC: 77 MG/DL (ref 65–99)
HCT VFR BLD AUTO: 41.3 % (ref 34–46.6)
HGB BLD-MCNC: 13.4 G/DL (ref 12–15.9)
MCH RBC QN AUTO: 27.6 PG (ref 26.6–33)
MCHC RBC AUTO-ENTMCNC: 32.4 G/DL (ref 31.5–35.7)
MCV RBC AUTO: 85.2 FL (ref 79–97)
PLATELET # BLD AUTO: 415 10*3/MM3 (ref 140–450)
PMV BLD AUTO: 9.5 FL (ref 6–12)
POTASSIUM SERPL-SCNC: 4.4 MMOL/L (ref 3.5–5.2)
RBC # BLD AUTO: 4.85 10*6/MM3 (ref 3.77–5.28)
RH BLD: POSITIVE
SODIUM SERPL-SCNC: 138 MMOL/L (ref 136–145)
T&S EXPIRATION DATE: NORMAL
WBC NRBC COR # BLD AUTO: 6.97 10*3/MM3 (ref 3.4–10.8)

## 2025-05-09 PROCEDURE — 86901 BLOOD TYPING SEROLOGIC RH(D): CPT | Performed by: COLON & RECTAL SURGERY

## 2025-05-09 PROCEDURE — 86850 RBC ANTIBODY SCREEN: CPT | Performed by: COLON & RECTAL SURGERY

## 2025-05-09 PROCEDURE — 86900 BLOOD TYPING SEROLOGIC ABO: CPT | Performed by: COLON & RECTAL SURGERY

## 2025-05-09 PROCEDURE — 36415 COLL VENOUS BLD VENIPUNCTURE: CPT

## 2025-05-09 PROCEDURE — 80048 BASIC METABOLIC PNL TOTAL CA: CPT

## 2025-05-09 PROCEDURE — 82378 CARCINOEMBRYONIC ANTIGEN: CPT | Performed by: COLON & RECTAL SURGERY

## 2025-05-09 PROCEDURE — 85027 COMPLETE CBC AUTOMATED: CPT

## 2025-05-09 RX ORDER — VITAMIN B COMPLEX
1 CAPSULE ORAL DAILY
COMMUNITY

## 2025-05-09 RX ORDER — NEOMYCIN SULFATE 500 MG/1
1000 TABLET ORAL SEE ADMIN INSTRUCTIONS
Qty: 6 TABLET | Refills: 0 | Status: SHIPPED | OUTPATIENT
Start: 2025-05-09

## 2025-05-09 RX ORDER — ERYTHROMYCIN 500 MG/1
1000 TABLET, DELAYED RELEASE ORAL SEE ADMIN INSTRUCTIONS
Qty: 6 TABLET | Refills: 0 | Status: SHIPPED | OUTPATIENT
Start: 2025-05-09

## 2025-05-09 NOTE — DISCHARGE INSTRUCTIONS
Take the following medications the morning of surgery:  AMLODIPINE, ESOMEPRAZOLE    DO NOT TAKE BENAZEPRIL DAY OF SURGERY OF NIGHT BEFORE SURGERY    (CALL DR RIVAS'S OFFICE TO SEE IF NEED TO TAKE ANY ANTIBIOTICS DAY PRIOR TO SURGERY)    If you are on prescription narcotic pain medication to control your pain you may also take that medication the morning of surgery.    General Instructions:    Follow your surgeons instructions regarding when to stop solid foods and when to stop liquids.   Verify with your surgeon if you are to complete a bowel prep and when to do so.  Patients who avoid smoking, chewing tobacco and alcohol for 4 weeks prior to surgery have a reduced risk of post-operative complications.  Quit smoking as many days before surgery as you can.  Do not smoke, use chewing tobacco or drink alcohol the day of surgery.   If applicable bring your C-PAP/ BI-PAP machine in with you to preop day of surgery.  Bring any papers given to you in the doctor’s office.  Wear clean comfortable clothes.  Do not wear contact lenses, false eyelashes or make-up.  Bring a case for your glasses.   Bring crutches or walker if applicable.  Remove all piercings.  Leave jewelry and any other valuables at home.  Hair extensions with metal clips must be removed prior to surgery.  The Pre-Admission Testing nurse will instruct you to bring medications if unable to obtain an accurate list in Pre-Admission Testing.    Day of surgery you will need to let the preoperative nurse know the last time you took each of your medications.  To ensure a safe environment for patients and staff, we kindly ask that children under the age of 16 not accompany patients.  If you must bring a dependent child or dependent adult please ensure a responsible adult, other than yourself, is present to supervise them.        If you were given a blood bank ID arm band remember to bring it with you the day of surgery.    Preventing a Surgical Site Infection:  For 2  to 3 days before surgery, avoid shaving with a razor because the razor can irritate skin and make it easier to develop an infection.    Any areas of open skin can increase the risk of a post-operative wound infection by allowing bacteria to enter and travel throughout the body.  Notify your surgeon if you have any skin wounds / rashes even if it is not near the expected surgical site.  The area will need assessed to determine if surgery should be delayed until it is healed.  The night prior to surgery shower using a fresh bar of anti-bacterial soap (such as Dial) and clean washcloth.  Sleep in a clean bed with clean clothing.  Do not allow pets to sleep with you.  Shower on the morning of surgery using a fresh bar of anti-bacterial soap (such as Dial) and clean washcloth.  Dry with a clean towel and dress in clean clothing.  Ask your surgeon if you will be receiving antibiotics prior to surgery.  Make sure you, your family, and all healthcare providers clean their hands with soap and water or an alcohol based hand  before caring for you or your wound.    Day of surgery:  Your arrival time is approximately two hours before your scheduled surgery time.  Upon arrival, a Pre-op nurse and Anesthesiologist will review your health history, obtain vital signs, and answer questions you may have.  The only belongings needed at this time will be a list of your home medications and if applicable your C-PAP/BI-PAP machine.  A Pre-op nurse will start an IV and you may receive medication in preparation for surgery, including something to help you relax.     Please be aware that surgery does come with discomfort.  We want to make every effort to control your discomfort so please discuss any uncontrolled symptoms with your nurse.   Your doctor will most likely have prescribed pain medications.      If you are going home after surgery you will receive individualized written care instructions before being discharged.  A  responsible adult must drive you to and from the hospital on the day of your surgery and stay with you for 24 hours.  Discharge prescriptions can be filled by the hospital pharmacy during regular pharmacy hours.  If you are having surgery late in the day/evening your prescription may be e-prescribed to your pharmacy.  Please verify your pharmacy hours or chose a 24 hour pharmacy to avoid not having access to your prescription because your pharmacy has closed for the day.    If you are staying overnight following surgery, you will be transported to your hospital room following the recovery period.  [facility] has all private rooms.    If you have any questions please call Pre-Admission Testing at [phone].  Deductibles and co-payments are collected on the day of service. Please be prepared to pay the required co-pay, deductible or deposit on the day of service as defined by your plan.    Call your surgeon immediately if you experience any of the following symptoms:  Sore Throat  Shortness of Breath or difficulty breathing  Cough  Chills  Body soreness or muscle pain  Headache  Fever  New loss of taste or smell  Do not arrive for your surgery ill.  Your procedure will need to be rescheduled to another time.  You will need to call your physician before the day of surgery to avoid any unnecessary exposure to hospital staff as well as other patients.            CHLORHEXIDINE CLOTH INSTRUCTIONS  The morning of surgery follow these instructions using the Chlorhexidine cloths you've been given.  These steps reduce bacteria on the body.  Do not use the cloths near your eyes, ears mouth, genitalia or on open wounds.  Throw the cloths away after use but do not try to flush them down a toilet.      Open and remove one cloth at a time from the package.    Leave the cloth unfolded and begin the bathing.  Massage the skin with the cloths using gentle pressure to remove bacteria.  Do not scrub harshly.   Follow  the steps below with one 2% CHG cloth per area (6 total cloths).  One cloth for neck, shoulders and chest.  One cloth for both arms, hands, fingers and underarms (do underarms last).  One cloth for the abdomen followed by groin.  One cloth for right leg and foot including between the toes.  One cloth for left leg and foot including between the toes.  The last cloth is to be used for the back of the neck, back and buttocks.    Allow the CHG to air dry 3 minutes on the skin which will give it time to work and decrease the chance of irritation.  The skin may feel sticky until it is dry.  Do not rinse with water or any other liquid or you will lose the beneficial effects of the CHG.  If mild skin irritation occurs, do rinse the skin to remove the CHG.  Report this to the nurse at time of admission.  Do not apply lotions, creams, ointments, deodorants or perfumes after using the clothes. Dress in clean clothes before coming to the hospital.

## 2025-05-09 NOTE — NURSING NOTE
05/09/25 1504   Stoma Site Marking   Procedure Marking For ileostomy   Site Marked RLQ: right lower quadrant   Patient Assessment Screen rectus muscle identified;bony prominences avoided;waistband avoided;creases/scars avoided;marked within patient's visual field   How Was Patient Marked? surgical marker;transparent dressing   Patient Position During Marking sitting;standing     OMS: We are seeing a patient in the PAT to perform stoma marking for a possible ileostomy. The patient is somewhat anxious about the procedure, the abdomen is soft, questions were answered. We will follow up postoperatively for education on appliance management and daily living.

## 2025-05-13 ENCOUNTER — HOSPITAL ENCOUNTER (EMERGENCY)
Facility: HOSPITAL | Age: 70
Discharge: HOME | DRG: 331 | End: 2025-05-13
Attending: EMERGENCY MEDICINE | Admitting: EMERGENCY MEDICINE
Payer: MEDICARE

## 2025-05-13 ENCOUNTER — HOSPITAL ENCOUNTER (INPATIENT)
Facility: HOSPITAL | Age: 70
LOS: 3 days | Discharge: HOME HEALTH | DRG: 331 | End: 2025-05-16
Attending: COLON & RECTAL SURGERY | Admitting: COLON & RECTAL SURGERY
Payer: MEDICARE

## 2025-05-13 ENCOUNTER — ANESTHESIA (OUTPATIENT)
Dept: PERIOP | Facility: HOSPITAL | Age: 70
End: 2025-05-13
Payer: MEDICARE

## 2025-05-13 ENCOUNTER — ANESTHESIA EVENT (OUTPATIENT)
Dept: PERIOP | Facility: HOSPITAL | Age: 70
End: 2025-05-13
Payer: MEDICARE

## 2025-05-13 VITALS
TEMPERATURE: 97.1 F | HEIGHT: 64 IN | DIASTOLIC BLOOD PRESSURE: 66 MMHG | RESPIRATION RATE: 18 BRPM | HEART RATE: 76 BPM | WEIGHT: 154 LBS | SYSTOLIC BLOOD PRESSURE: 112 MMHG | OXYGEN SATURATION: 98 % | BODY MASS INDEX: 26.29 KG/M2

## 2025-05-13 DIAGNOSIS — K62.89 RECTAL MASS: ICD-10-CM

## 2025-05-13 DIAGNOSIS — Z93.2 ILEOSTOMY IN PLACE: Primary | ICD-10-CM

## 2025-05-13 DIAGNOSIS — C18.9 MALIGNANT NEOPLASM OF COLON, UNSPECIFIED PART OF COLON: ICD-10-CM

## 2025-05-13 DIAGNOSIS — C18.7 MALIGNANT NEOPLASM OF SIGMOID COLON: ICD-10-CM

## 2025-05-13 DIAGNOSIS — C18.7 CANCER OF SIGMOID: ICD-10-CM

## 2025-05-13 DIAGNOSIS — K55.9 ISCHEMIC COLITIS: ICD-10-CM

## 2025-05-13 DIAGNOSIS — K52.9 COLITIS: ICD-10-CM

## 2025-05-13 DIAGNOSIS — R19.7 NAUSEA VOMITING AND DIARRHEA: Primary | ICD-10-CM

## 2025-05-13 DIAGNOSIS — R11.2 NAUSEA VOMITING AND DIARRHEA: Primary | ICD-10-CM

## 2025-05-13 LAB
ALBUMIN SERPL-MCNC: 4.8 G/DL (ref 3.5–5.2)
ALBUMIN/GLOB SERPL: 1.5 G/DL
ALP SERPL-CCNC: 99 U/L (ref 39–117)
ALT SERPL W P-5'-P-CCNC: 13 U/L (ref 1–33)
ANION GAP SERPL CALCULATED.3IONS-SCNC: 14.3 MMOL/L (ref 5–15)
AST SERPL-CCNC: 19 U/L (ref 1–32)
BASOPHILS # BLD AUTO: 0.02 10*3/MM3 (ref 0–0.2)
BASOPHILS NFR BLD AUTO: 0.2 % (ref 0–1.5)
BILIRUB SERPL-MCNC: 1.2 MG/DL (ref 0–1.2)
BUN SERPL-MCNC: 18 MG/DL (ref 8–23)
BUN/CREAT SERPL: 20.9 (ref 7–25)
CALCIUM SPEC-SCNC: 10 MG/DL (ref 8.6–10.5)
CHLORIDE SERPL-SCNC: 101 MMOL/L (ref 98–107)
CO2 SERPL-SCNC: 18.7 MMOL/L (ref 22–29)
CREAT SERPL-MCNC: 0.86 MG/DL (ref 0.57–1)
DEPRECATED RDW RBC AUTO: 39.5 FL (ref 37–54)
EGFRCR SERPLBLD CKD-EPI 2021: 73.2 ML/MIN/1.73
EOSINOPHIL # BLD AUTO: 0.3 10*3/MM3 (ref 0–0.4)
EOSINOPHIL NFR BLD AUTO: 3.1 % (ref 0.3–6.2)
ERYTHROCYTE [DISTWIDTH] IN BLOOD BY AUTOMATED COUNT: 12.8 % (ref 12.3–15.4)
GLOBULIN UR ELPH-MCNC: 3.1 GM/DL
GLUCOSE SERPL-MCNC: 105 MG/DL (ref 65–99)
HCT VFR BLD AUTO: 41 % (ref 34–46.6)
HGB BLD-MCNC: 13.5 G/DL (ref 12–15.9)
IMM GRANULOCYTES # BLD AUTO: 0.02 10*3/MM3 (ref 0–0.05)
IMM GRANULOCYTES NFR BLD AUTO: 0.2 % (ref 0–0.5)
LIPASE SERPL-CCNC: 21 U/L (ref 13–60)
LYMPHOCYTES # BLD AUTO: 1.68 10*3/MM3 (ref 0.7–3.1)
LYMPHOCYTES NFR BLD AUTO: 17.4 % (ref 19.6–45.3)
MCH RBC QN AUTO: 27.8 PG (ref 26.6–33)
MCHC RBC AUTO-ENTMCNC: 32.9 G/DL (ref 31.5–35.7)
MCV RBC AUTO: 84.5 FL (ref 79–97)
MONOCYTES # BLD AUTO: 0.7 10*3/MM3 (ref 0.1–0.9)
MONOCYTES NFR BLD AUTO: 7.3 % (ref 5–12)
NEUTROPHILS NFR BLD AUTO: 6.92 10*3/MM3 (ref 1.7–7)
NEUTROPHILS NFR BLD AUTO: 71.8 % (ref 42.7–76)
NRBC BLD AUTO-RTO: 0 /100 WBC (ref 0–0.2)
PLATELET # BLD AUTO: 437 10*3/MM3 (ref 140–450)
PMV BLD AUTO: 9.2 FL (ref 6–12)
POTASSIUM SERPL-SCNC: 4.1 MMOL/L (ref 3.5–5.2)
PROT SERPL-MCNC: 7.9 G/DL (ref 6–8.5)
RBC # BLD AUTO: 4.85 10*6/MM3 (ref 3.77–5.28)
SODIUM SERPL-SCNC: 134 MMOL/L (ref 136–145)
WBC NRBC COR # BLD AUTO: 9.64 10*3/MM3 (ref 3.4–10.8)

## 2025-05-13 PROCEDURE — 25010000002 DEXAMETHASONE SODIUM PHOSPHATE 20 MG/5ML SOLUTION: Performed by: NURSE ANESTHETIST, CERTIFIED REGISTERED

## 2025-05-13 PROCEDURE — 0DBG4ZZ EXCISION OF LEFT LARGE INTESTINE, PERCUTANEOUS ENDOSCOPIC APPROACH: ICD-10-PCS | Performed by: COLON & RECTAL SURGERY

## 2025-05-13 PROCEDURE — 25010000002 CEFAZOLIN PER 500 MG: Performed by: COLON & RECTAL SURGERY

## 2025-05-13 PROCEDURE — 25010000002 ONDANSETRON PER 1 MG: Performed by: NURSE ANESTHETIST, CERTIFIED REGISTERED

## 2025-05-13 PROCEDURE — S2900 ROBOTIC SURGICAL SYSTEM: HCPCS | Performed by: COLON & RECTAL SURGERY

## 2025-05-13 PROCEDURE — 25010000002 PROPOFOL 10 MG/ML EMULSION: Performed by: NURSE ANESTHETIST, CERTIFIED REGISTERED

## 2025-05-13 PROCEDURE — 25010000002 MIDAZOLAM PER 1 MG: Performed by: ANESTHESIOLOGY

## 2025-05-13 PROCEDURE — 88309 TISSUE EXAM BY PATHOLOGIST: CPT | Performed by: COLON & RECTAL SURGERY

## 2025-05-13 PROCEDURE — 44208 L COLECTOMY/COLOPROCTOSTOMY: CPT | Performed by: COLON & RECTAL SURGERY

## 2025-05-13 PROCEDURE — 83690 ASSAY OF LIPASE: CPT | Performed by: PHYSICIAN ASSISTANT

## 2025-05-13 PROCEDURE — 88305 TISSUE EXAM BY PATHOLOGIST: CPT | Performed by: COLON & RECTAL SURGERY

## 2025-05-13 PROCEDURE — 85025 COMPLETE CBC W/AUTO DIFF WBC: CPT | Performed by: PHYSICIAN ASSISTANT

## 2025-05-13 PROCEDURE — 25010000002 ACETAMINOPHEN 10 MG/ML SOLUTION: Performed by: COLON & RECTAL SURGERY

## 2025-05-13 PROCEDURE — 36415 COLL VENOUS BLD VENIPUNCTURE: CPT

## 2025-05-13 PROCEDURE — 80053 COMPREHEN METABOLIC PANEL: CPT | Performed by: PHYSICIAN ASSISTANT

## 2025-05-13 PROCEDURE — 99283 EMERGENCY DEPT VISIT LOW MDM: CPT

## 2025-05-13 PROCEDURE — 25010000002 FENTANYL CITRATE (PF) 50 MCG/ML SOLUTION: Performed by: NURSE ANESTHETIST, CERTIFIED REGISTERED

## 2025-05-13 PROCEDURE — 25810000003 SODIUM CHLORIDE 0.9 % SOLUTION: Performed by: COLON & RECTAL SURGERY

## 2025-05-13 PROCEDURE — 25010000002 INDOCYANINE GREEN 25 MG RECONSTITUTED SOLUTION: Performed by: NURSE ANESTHETIST, CERTIFIED REGISTERED

## 2025-05-13 PROCEDURE — 25010000002 ONDANSETRON PER 1 MG: Performed by: PHYSICIAN ASSISTANT

## 2025-05-13 PROCEDURE — 25010000002 ONDANSETRON PER 1 MG: Performed by: ANESTHESIOLOGY

## 2025-05-13 PROCEDURE — 0D1B4Z4 BYPASS ILEUM TO CUTANEOUS, PERCUTANEOUS ENDOSCOPIC APPROACH: ICD-10-PCS | Performed by: COLON & RECTAL SURGERY

## 2025-05-13 PROCEDURE — 25010000002 LIDOCAINE 2% SOLUTION: Performed by: NURSE ANESTHETIST, CERTIFIED REGISTERED

## 2025-05-13 PROCEDURE — 25010000002 FAMOTIDINE 10 MG/ML SOLUTION: Performed by: ANESTHESIOLOGY

## 2025-05-13 PROCEDURE — 44208 L COLECTOMY/COLOPROCTOSTOMY: CPT | Performed by: PHYSICIAN ASSISTANT

## 2025-05-13 PROCEDURE — 25010000002 HYDROMORPHONE PER 4 MG: Performed by: NURSE ANESTHETIST, CERTIFIED REGISTERED

## 2025-05-13 PROCEDURE — 25010000002 CEFAZOLIN PER 500 MG: Performed by: NURSE ANESTHETIST, CERTIFIED REGISTERED

## 2025-05-13 PROCEDURE — 25010000002 BUPIVACAINE LIPOSOME 1.3 % SUSPENSION: Performed by: ANESTHESIOLOGY

## 2025-05-13 PROCEDURE — 25010000002 METRONIDAZOLE 500 MG/100ML SOLUTION: Performed by: COLON & RECTAL SURGERY

## 2025-05-13 PROCEDURE — 25010000002 KETOROLAC TROMETHAMINE PER 15 MG: Performed by: NURSE ANESTHETIST, CERTIFIED REGISTERED

## 2025-05-13 PROCEDURE — 25010000002 KETOROLAC TROMETHAMINE PER 15 MG: Performed by: COLON & RECTAL SURGERY

## 2025-05-13 PROCEDURE — 25810000003 LACTATED RINGERS SOLUTION: Performed by: PHYSICIAN ASSISTANT

## 2025-05-13 PROCEDURE — 25810000003 SODIUM CHLORIDE PER 500 ML: Performed by: COLON & RECTAL SURGERY

## 2025-05-13 PROCEDURE — 8E0W4CZ ROBOTIC ASSISTED PROCEDURE OF TRUNK REGION, PERCUTANEOUS ENDOSCOPIC APPROACH: ICD-10-PCS | Performed by: COLON & RECTAL SURGERY

## 2025-05-13 PROCEDURE — 25010000002 BUPIVACAINE (PF) 0.25 % SOLUTION: Performed by: ANESTHESIOLOGY

## 2025-05-13 PROCEDURE — 25810000003 LACTATED RINGERS PER 1000 ML: Performed by: NURSE ANESTHETIST, CERTIFIED REGISTERED

## 2025-05-13 PROCEDURE — C1765 ADHESION BARRIER: HCPCS | Performed by: COLON & RECTAL SURGERY

## 2025-05-13 PROCEDURE — 25010000002 MAGNESIUM SULFATE PER 500 MG OF MAGNESIUM: Performed by: NURSE ANESTHETIST, CERTIFIED REGISTERED

## 2025-05-13 PROCEDURE — 96361 HYDRATE IV INFUSION ADD-ON: CPT

## 2025-05-13 PROCEDURE — 25010000002 DROPERIDOL PER 5 MG: Performed by: NURSE ANESTHETIST, CERTIFIED REGISTERED

## 2025-05-13 PROCEDURE — 25010000002 SUGAMMADEX 200 MG/2ML SOLUTION: Performed by: NURSE ANESTHETIST, CERTIFIED REGISTERED

## 2025-05-13 PROCEDURE — 07BD4ZX EXCISION OF AORTIC LYMPHATIC, PERCUTANEOUS ENDOSCOPIC APPROACH, DIAGNOSTIC: ICD-10-PCS | Performed by: COLON & RECTAL SURGERY

## 2025-05-13 PROCEDURE — 96374 THER/PROPH/DIAG INJ IV PUSH: CPT

## 2025-05-13 PROCEDURE — 25810000003 LACTATED RINGERS PER 1000 ML: Performed by: ANESTHESIOLOGY

## 2025-05-13 DEVICE — CELLULAR STAPLER WITH TRI-STAPLE TECHNOLOGY
Type: IMPLANTABLE DEVICE | Site: COLON | Status: FUNCTIONAL
Brand: EEA

## 2025-05-13 DEVICE — SUREFORM 45 RELOAD GREEN
Type: IMPLANTABLE DEVICE | Site: COLON | Status: FUNCTIONAL
Brand: SUREFORM

## 2025-05-13 DEVICE — SUREFORM 45 RELOAD BLUE
Type: IMPLANTABLE DEVICE | Site: COLON | Status: FUNCTIONAL
Brand: SUREFORM

## 2025-05-13 RX ORDER — SODIUM CHLORIDE 0.9 % (FLUSH) 0.9 %
3 SYRINGE (ML) INJECTION EVERY 12 HOURS SCHEDULED
Status: DISCONTINUED | OUTPATIENT
Start: 2025-05-13 | End: 2025-05-13 | Stop reason: HOSPADM

## 2025-05-13 RX ORDER — MIDAZOLAM HYDROCHLORIDE 1 MG/ML
0.5 INJECTION, SOLUTION INTRAMUSCULAR; INTRAVENOUS
Status: DISCONTINUED | OUTPATIENT
Start: 2025-05-13 | End: 2025-05-13 | Stop reason: HOSPADM

## 2025-05-13 RX ORDER — ONDANSETRON 2 MG/ML
INJECTION INTRAMUSCULAR; INTRAVENOUS AS NEEDED
Status: DISCONTINUED | OUTPATIENT
Start: 2025-05-13 | End: 2025-05-13 | Stop reason: SURG

## 2025-05-13 RX ORDER — CELECOXIB 200 MG/1
200 CAPSULE ORAL ONCE
Status: DISCONTINUED | OUTPATIENT
Start: 2025-05-13 | End: 2025-05-13

## 2025-05-13 RX ORDER — ONDANSETRON 2 MG/ML
4 INJECTION INTRAMUSCULAR; INTRAVENOUS ONCE
Status: COMPLETED | OUTPATIENT
Start: 2025-05-13 | End: 2025-05-13

## 2025-05-13 RX ORDER — LIDOCAINE HYDROCHLORIDE 20 MG/ML
INJECTION, SOLUTION INFILTRATION; PERINEURAL AS NEEDED
Status: DISCONTINUED | OUTPATIENT
Start: 2025-05-13 | End: 2025-05-13 | Stop reason: SURG

## 2025-05-13 RX ORDER — OXYCODONE HYDROCHLORIDE 5 MG/1
5 TABLET ORAL ONCE AS NEEDED
Status: DISCONTINUED | OUTPATIENT
Start: 2025-05-13 | End: 2025-05-13 | Stop reason: HOSPADM

## 2025-05-13 RX ORDER — INDOCYANINE GREEN AND WATER 25 MG
KIT INJECTION AS NEEDED
Status: DISCONTINUED | OUTPATIENT
Start: 2025-05-13 | End: 2025-05-13 | Stop reason: SURG

## 2025-05-13 RX ORDER — KETAMINE HCL IN NACL, ISO-OSM 100MG/10ML
10 SYRINGE (ML) INJECTION
Status: DISCONTINUED | OUTPATIENT
Start: 2025-05-13 | End: 2025-05-13 | Stop reason: HOSPADM

## 2025-05-13 RX ORDER — LORAZEPAM 0.5 MG/1
0.5 TABLET ORAL EVERY 8 HOURS PRN
Status: DISCONTINUED | OUTPATIENT
Start: 2025-05-13 | End: 2025-05-16 | Stop reason: HOSPADM

## 2025-05-13 RX ORDER — IPRATROPIUM BROMIDE AND ALBUTEROL SULFATE 2.5; .5 MG/3ML; MG/3ML
3 SOLUTION RESPIRATORY (INHALATION) ONCE AS NEEDED
Status: DISCONTINUED | OUTPATIENT
Start: 2025-05-13 | End: 2025-05-13 | Stop reason: HOSPADM

## 2025-05-13 RX ORDER — BUPIVACAINE HYDROCHLORIDE AND EPINEPHRINE 2.5; 5 MG/ML; UG/ML
INJECTION, SOLUTION EPIDURAL; INFILTRATION; INTRACAUDAL; PERINEURAL AS NEEDED
Status: DISCONTINUED | OUTPATIENT
Start: 2025-05-13 | End: 2025-05-13 | Stop reason: HOSPADM

## 2025-05-13 RX ORDER — BUPIVACAINE HYDROCHLORIDE 2.5 MG/ML
INJECTION, SOLUTION EPIDURAL; INFILTRATION; INTRACAUDAL; PERINEURAL
Status: COMPLETED | OUTPATIENT
Start: 2025-05-13 | End: 2025-05-13

## 2025-05-13 RX ORDER — SCOPOLAMINE 1 MG/3D
1 PATCH, EXTENDED RELEASE TRANSDERMAL CONTINUOUS
Status: DISPENSED | OUTPATIENT
Start: 2025-05-13 | End: 2025-05-16

## 2025-05-13 RX ORDER — SODIUM CHLORIDE 9 MG/ML
50 INJECTION, SOLUTION INTRAVENOUS CONTINUOUS
Status: ACTIVE | OUTPATIENT
Start: 2025-05-13 | End: 2025-05-14

## 2025-05-13 RX ORDER — SODIUM CHLORIDE, SODIUM LACTATE, POTASSIUM CHLORIDE, CALCIUM CHLORIDE 600; 310; 30; 20 MG/100ML; MG/100ML; MG/100ML; MG/100ML
9 INJECTION, SOLUTION INTRAVENOUS CONTINUOUS
Status: DISCONTINUED | OUTPATIENT
Start: 2025-05-13 | End: 2025-05-13

## 2025-05-13 RX ORDER — NALOXONE HCL 0.4 MG/ML
0.4 VIAL (ML) INJECTION
Status: DISCONTINUED | OUTPATIENT
Start: 2025-05-13 | End: 2025-05-16 | Stop reason: HOSPADM

## 2025-05-13 RX ORDER — DROPERIDOL 2.5 MG/ML
0.62 INJECTION, SOLUTION INTRAMUSCULAR; INTRAVENOUS
Status: DISCONTINUED | OUTPATIENT
Start: 2025-05-13 | End: 2025-05-13 | Stop reason: HOSPADM

## 2025-05-13 RX ORDER — HYDROMORPHONE HYDROCHLORIDE 1 MG/ML
0.25 INJECTION, SOLUTION INTRAMUSCULAR; INTRAVENOUS; SUBCUTANEOUS
Status: DISCONTINUED | OUTPATIENT
Start: 2025-05-13 | End: 2025-05-16 | Stop reason: HOSPADM

## 2025-05-13 RX ORDER — NITROGLYCERIN 0.4 MG/1
0.4 TABLET SUBLINGUAL
Status: DISCONTINUED | OUTPATIENT
Start: 2025-05-13 | End: 2025-05-16 | Stop reason: HOSPADM

## 2025-05-13 RX ORDER — PANTOPRAZOLE SODIUM 40 MG/1
40 TABLET, DELAYED RELEASE ORAL
Status: DISCONTINUED | OUTPATIENT
Start: 2025-05-14 | End: 2025-05-16 | Stop reason: HOSPADM

## 2025-05-13 RX ORDER — GABAPENTIN 300 MG/1
300 CAPSULE ORAL EVERY 12 HOURS SCHEDULED
Status: DISCONTINUED | OUTPATIENT
Start: 2025-05-13 | End: 2025-05-16 | Stop reason: HOSPADM

## 2025-05-13 RX ORDER — MAGNESIUM SULFATE HEPTAHYDRATE 500 MG/ML
INJECTION, SOLUTION INTRAMUSCULAR; INTRAVENOUS AS NEEDED
Status: DISCONTINUED | OUTPATIENT
Start: 2025-05-13 | End: 2025-05-13 | Stop reason: SURG

## 2025-05-13 RX ORDER — DIPHENHYDRAMINE HCL 25 MG
25 CAPSULE ORAL EVERY 4 HOURS PRN
Status: DISCONTINUED | OUTPATIENT
Start: 2025-05-13 | End: 2025-05-13 | Stop reason: HOSPADM

## 2025-05-13 RX ORDER — SODIUM CHLORIDE 0.9 % (FLUSH) 0.9 %
3-10 SYRINGE (ML) INJECTION AS NEEDED
Status: DISCONTINUED | OUTPATIENT
Start: 2025-05-13 | End: 2025-05-13 | Stop reason: HOSPADM

## 2025-05-13 RX ORDER — PROPOFOL 10 MG/ML
VIAL (ML) INTRAVENOUS AS NEEDED
Status: DISCONTINUED | OUTPATIENT
Start: 2025-05-13 | End: 2025-05-13 | Stop reason: SURG

## 2025-05-13 RX ORDER — ONDANSETRON 2 MG/ML
4 INJECTION INTRAMUSCULAR; INTRAVENOUS EVERY 6 HOURS PRN
Status: DISCONTINUED | OUTPATIENT
Start: 2025-05-13 | End: 2025-05-13

## 2025-05-13 RX ORDER — FAMOTIDINE 10 MG/ML
20 INJECTION, SOLUTION INTRAVENOUS ONCE
Status: COMPLETED | OUTPATIENT
Start: 2025-05-13 | End: 2025-05-13

## 2025-05-13 RX ORDER — ALVIMOPAN 12 MG/1
12 CAPSULE ORAL ONCE
Status: COMPLETED | OUTPATIENT
Start: 2025-05-13 | End: 2025-05-13

## 2025-05-13 RX ORDER — NALOXONE HCL 0.4 MG/ML
0.4 VIAL (ML) INJECTION AS NEEDED
Status: DISCONTINUED | OUTPATIENT
Start: 2025-05-13 | End: 2025-05-13 | Stop reason: HOSPADM

## 2025-05-13 RX ORDER — FENTANYL CITRATE 50 UG/ML
50 INJECTION, SOLUTION INTRAMUSCULAR; INTRAVENOUS ONCE AS NEEDED
Status: DISCONTINUED | OUTPATIENT
Start: 2025-05-13 | End: 2025-05-13 | Stop reason: HOSPADM

## 2025-05-13 RX ORDER — GABAPENTIN 300 MG/1
300 CAPSULE ORAL ONCE
Status: DISCONTINUED | OUTPATIENT
Start: 2025-05-13 | End: 2025-05-13

## 2025-05-13 RX ORDER — DIPHENHYDRAMINE HYDROCHLORIDE 50 MG/ML
25 INJECTION, SOLUTION INTRAMUSCULAR; INTRAVENOUS EVERY 4 HOURS PRN
Status: DISCONTINUED | OUTPATIENT
Start: 2025-05-13 | End: 2025-05-13 | Stop reason: HOSPADM

## 2025-05-13 RX ORDER — ONDANSETRON 2 MG/ML
4 INJECTION INTRAMUSCULAR; INTRAVENOUS EVERY 6 HOURS PRN
Status: DISCONTINUED | OUTPATIENT
Start: 2025-05-13 | End: 2025-05-16 | Stop reason: HOSPADM

## 2025-05-13 RX ORDER — ENOXAPARIN SODIUM 100 MG/ML
40 INJECTION SUBCUTANEOUS DAILY
Status: DISCONTINUED | OUTPATIENT
Start: 2025-05-14 | End: 2025-05-16 | Stop reason: HOSPADM

## 2025-05-13 RX ORDER — ROCURONIUM BROMIDE 10 MG/ML
INJECTION, SOLUTION INTRAVENOUS AS NEEDED
Status: DISCONTINUED | OUTPATIENT
Start: 2025-05-13 | End: 2025-05-13 | Stop reason: SURG

## 2025-05-13 RX ORDER — METOCLOPRAMIDE HYDROCHLORIDE 5 MG/ML
10 INJECTION INTRAMUSCULAR; INTRAVENOUS ONCE AS NEEDED
Status: DISCONTINUED | OUTPATIENT
Start: 2025-05-13 | End: 2025-05-13 | Stop reason: HOSPADM

## 2025-05-13 RX ORDER — ACETAMINOPHEN 10 MG/ML
1000 INJECTION, SOLUTION INTRAVENOUS ONCE
Status: COMPLETED | OUTPATIENT
Start: 2025-05-13 | End: 2025-05-13

## 2025-05-13 RX ORDER — DEXAMETHASONE SODIUM PHOSPHATE 4 MG/ML
INJECTION, SOLUTION INTRA-ARTICULAR; INTRALESIONAL; INTRAMUSCULAR; INTRAVENOUS; SOFT TISSUE AS NEEDED
Status: DISCONTINUED | OUTPATIENT
Start: 2025-05-13 | End: 2025-05-13 | Stop reason: SURG

## 2025-05-13 RX ORDER — SODIUM CHLORIDE, SODIUM LACTATE, POTASSIUM CHLORIDE, CALCIUM CHLORIDE 600; 310; 30; 20 MG/100ML; MG/100ML; MG/100ML; MG/100ML
INJECTION, SOLUTION INTRAVENOUS CONTINUOUS PRN
Status: DISCONTINUED | OUTPATIENT
Start: 2025-05-13 | End: 2025-05-13 | Stop reason: SURG

## 2025-05-13 RX ORDER — LABETALOL HYDROCHLORIDE 5 MG/ML
5 INJECTION, SOLUTION INTRAVENOUS
Status: DISCONTINUED | OUTPATIENT
Start: 2025-05-13 | End: 2025-05-13 | Stop reason: HOSPADM

## 2025-05-13 RX ORDER — LIDOCAINE HYDROCHLORIDE 10 MG/ML
0.5 INJECTION, SOLUTION INFILTRATION; PERINEURAL ONCE AS NEEDED
Status: DISCONTINUED | OUTPATIENT
Start: 2025-05-13 | End: 2025-05-13 | Stop reason: HOSPADM

## 2025-05-13 RX ORDER — ACETAMINOPHEN 500 MG
1000 TABLET ORAL ONCE
Status: DISCONTINUED | OUTPATIENT
Start: 2025-05-13 | End: 2025-05-13

## 2025-05-13 RX ORDER — METRONIDAZOLE 500 MG/100ML
500 INJECTION, SOLUTION INTRAVENOUS ONCE
Status: COMPLETED | OUTPATIENT
Start: 2025-05-13 | End: 2025-05-13

## 2025-05-13 RX ORDER — FLUMAZENIL 0.1 MG/ML
0.2 INJECTION INTRAVENOUS AS NEEDED
Status: DISCONTINUED | OUTPATIENT
Start: 2025-05-13 | End: 2025-05-13 | Stop reason: HOSPADM

## 2025-05-13 RX ORDER — KETOROLAC TROMETHAMINE 30 MG/ML
INJECTION, SOLUTION INTRAMUSCULAR; INTRAVENOUS AS NEEDED
Status: DISCONTINUED | OUTPATIENT
Start: 2025-05-13 | End: 2025-05-13 | Stop reason: SURG

## 2025-05-13 RX ORDER — LATANOPROST 50 UG/ML
1 SOLUTION/ DROPS OPHTHALMIC NIGHTLY
Status: DISCONTINUED | OUTPATIENT
Start: 2025-05-13 | End: 2025-05-16 | Stop reason: HOSPADM

## 2025-05-13 RX ORDER — ACETAMINOPHEN 10 MG/ML
1000 INJECTION, SOLUTION INTRAVENOUS EVERY 6 HOURS
Status: DISCONTINUED | OUTPATIENT
Start: 2025-05-13 | End: 2025-05-14

## 2025-05-13 RX ORDER — HYDRALAZINE HYDROCHLORIDE 20 MG/ML
5 INJECTION INTRAMUSCULAR; INTRAVENOUS
Status: DISCONTINUED | OUTPATIENT
Start: 2025-05-13 | End: 2025-05-13 | Stop reason: HOSPADM

## 2025-05-13 RX ORDER — FENTANYL CITRATE 50 UG/ML
INJECTION, SOLUTION INTRAMUSCULAR; INTRAVENOUS AS NEEDED
Status: DISCONTINUED | OUTPATIENT
Start: 2025-05-13 | End: 2025-05-13 | Stop reason: SURG

## 2025-05-13 RX ORDER — ALVIMOPAN 12 MG/1
12 CAPSULE ORAL 2 TIMES DAILY
Status: DISCONTINUED | OUTPATIENT
Start: 2025-05-14 | End: 2025-05-15

## 2025-05-13 RX ORDER — SODIUM CHLORIDE 9 MG/ML
INJECTION, SOLUTION INTRAVENOUS AS NEEDED
Status: DISCONTINUED | OUTPATIENT
Start: 2025-05-13 | End: 2025-05-13 | Stop reason: HOSPADM

## 2025-05-13 RX ORDER — HYDROMORPHONE HYDROCHLORIDE 1 MG/ML
0.25 INJECTION, SOLUTION INTRAMUSCULAR; INTRAVENOUS; SUBCUTANEOUS
Status: DISCONTINUED | OUTPATIENT
Start: 2025-05-13 | End: 2025-05-13 | Stop reason: HOSPADM

## 2025-05-13 RX ORDER — KETOROLAC TROMETHAMINE 30 MG/ML
30 INJECTION, SOLUTION INTRAMUSCULAR; INTRAVENOUS EVERY 8 HOURS
Status: DISCONTINUED | OUTPATIENT
Start: 2025-05-13 | End: 2025-05-14

## 2025-05-13 RX ORDER — ONDANSETRON 2 MG/ML
4 INJECTION INTRAMUSCULAR; INTRAVENOUS ONCE AS NEEDED
Status: DISCONTINUED | OUTPATIENT
Start: 2025-05-13 | End: 2025-05-13 | Stop reason: HOSPADM

## 2025-05-13 RX ORDER — NALOXONE HCL 0.4 MG/ML
0.4 VIAL (ML) INJECTION
Status: DISCONTINUED | OUTPATIENT
Start: 2025-05-13 | End: 2025-05-13 | Stop reason: HOSPADM

## 2025-05-13 RX ORDER — ACETAMINOPHEN 10 MG/ML
1000 INJECTION, SOLUTION INTRAVENOUS
Status: COMPLETED | OUTPATIENT
Start: 2025-05-14 | End: 2025-05-13

## 2025-05-13 RX ADMIN — Medication 10 MG: at 12:48

## 2025-05-13 RX ADMIN — DEXAMETHASONE SODIUM PHOSPHATE 8 MG: 4 INJECTION, SOLUTION INTRAMUSCULAR; INTRAVENOUS at 08:40

## 2025-05-13 RX ADMIN — SODIUM CHLORIDE, POTASSIUM CHLORIDE, SODIUM LACTATE AND CALCIUM CHLORIDE 1000 ML: 600; 310; 30; 20 INJECTION, SOLUTION INTRAVENOUS at 03:22

## 2025-05-13 RX ADMIN — ONDANSETRON 4 MG: 2 INJECTION, SOLUTION INTRAMUSCULAR; INTRAVENOUS at 03:19

## 2025-05-13 RX ADMIN — Medication 10 MG: at 12:34

## 2025-05-13 RX ADMIN — KETOROLAC TROMETHAMINE 30 MG: 30 INJECTION, SOLUTION INTRAMUSCULAR; INTRAVENOUS at 11:27

## 2025-05-13 RX ADMIN — ACETAMINOPHEN 1000 MG: 10 INJECTION INTRAVENOUS at 20:01

## 2025-05-13 RX ADMIN — Medication 10 MG: at 12:21

## 2025-05-13 RX ADMIN — Medication 10 MG: at 13:22

## 2025-05-13 RX ADMIN — INDOCYANINE GREEN 7.5 MG: KIT INTRAVENOUS at 10:27

## 2025-05-13 RX ADMIN — LIDOCAINE HYDROCHLORIDE 60 MG: 20 INJECTION, SOLUTION INFILTRATION; PERINEURAL at 08:20

## 2025-05-13 RX ADMIN — Medication 10 MG: at 12:08

## 2025-05-13 RX ADMIN — LATANOPROST 1 DROP: 50 SOLUTION/ DROPS OPHTHALMIC at 20:01

## 2025-05-13 RX ADMIN — HYDROMORPHONE HYDROCHLORIDE 0.25 MG: 1 INJECTION, SOLUTION INTRAMUSCULAR; INTRAVENOUS; SUBCUTANEOUS at 13:49

## 2025-05-13 RX ADMIN — PROPOFOL 100 MG: 10 INJECTION, EMULSION INTRAVENOUS at 08:23

## 2025-05-13 RX ADMIN — ALVIMOPAN 12 MG: 12 CAPSULE ORAL at 07:59

## 2025-05-13 RX ADMIN — HYDROMORPHONE HYDROCHLORIDE 0.25 MG: 1 INJECTION, SOLUTION INTRAMUSCULAR; INTRAVENOUS; SUBCUTANEOUS at 12:32

## 2025-05-13 RX ADMIN — MAGNESIUM SULFATE HEPTAHYDRATE 2 G: 500 INJECTION, SOLUTION INTRAMUSCULAR; INTRAVENOUS at 09:03

## 2025-05-13 RX ADMIN — PROPOFOL 200 MG: 10 INJECTION, EMULSION INTRAVENOUS at 08:20

## 2025-05-13 RX ADMIN — SODIUM CHLORIDE 2 G: 900 INJECTION INTRAVENOUS at 07:59

## 2025-05-13 RX ADMIN — FENTANYL CITRATE 25 MCG: 50 INJECTION, SOLUTION INTRAMUSCULAR; INTRAVENOUS at 08:15

## 2025-05-13 RX ADMIN — FENTANYL CITRATE 25 MCG: 50 INJECTION, SOLUTION INTRAMUSCULAR; INTRAVENOUS at 08:48

## 2025-05-13 RX ADMIN — INDOCYANINE GREEN 5 MG: KIT INTRAVENOUS at 10:49

## 2025-05-13 RX ADMIN — SCOPOLAMINE 1 PATCH: 1.5 PATCH, EXTENDED RELEASE TRANSDERMAL at 07:56

## 2025-05-13 RX ADMIN — ROCURONIUM BROMIDE 20 MG: 10 INJECTION INTRAVENOUS at 10:12

## 2025-05-13 RX ADMIN — SODIUM CHLORIDE, POTASSIUM CHLORIDE, SODIUM LACTATE AND CALCIUM CHLORIDE 9 ML/HR: 600; 310; 30; 20 INJECTION, SOLUTION INTRAVENOUS at 07:55

## 2025-05-13 RX ADMIN — SUGAMMADEX 280 MG: 100 INJECTION, SOLUTION INTRAVENOUS at 11:41

## 2025-05-13 RX ADMIN — GABAPENTIN 300 MG: 300 CAPSULE ORAL at 20:02

## 2025-05-13 RX ADMIN — BUPIVACAINE 10 ML: 13.3 INJECTION, SUSPENSION, LIPOSOMAL INFILTRATION at 08:28

## 2025-05-13 RX ADMIN — BUPIVACAINE HYDROCHLORIDE 15 ML: 2.5 INJECTION, SOLUTION EPIDURAL; INFILTRATION; INTRACAUDAL; PERINEURAL at 08:28

## 2025-05-13 RX ADMIN — DROPERIDOL 0.62 MG: 2.5 INJECTION, SOLUTION INTRAMUSCULAR; INTRAVENOUS at 13:19

## 2025-05-13 RX ADMIN — CEFAZOLIN 2 G: 2 INJECTION, POWDER, LYOPHILIZED, FOR SOLUTION INTRAVENOUS at 11:59

## 2025-05-13 RX ADMIN — ROCURONIUM BROMIDE 20 MG: 10 INJECTION INTRAVENOUS at 08:48

## 2025-05-13 RX ADMIN — METRONIDAZOLE 500 MG: 500 INJECTION, SOLUTION INTRAVENOUS at 07:59

## 2025-05-13 RX ADMIN — PROPOFOL 50 MCG/KG/MIN: 10 INJECTION, EMULSION INTRAVENOUS at 08:29

## 2025-05-13 RX ADMIN — ACETAMINOPHEN 1000 MG: 10 INJECTION INTRAVENOUS at 17:51

## 2025-05-13 RX ADMIN — HYDROMORPHONE HYDROCHLORIDE 0.25 MG: 1 INJECTION, SOLUTION INTRAMUSCULAR; INTRAVENOUS; SUBCUTANEOUS at 12:02

## 2025-05-13 RX ADMIN — ONDANSETRON 4 MG: 2 INJECTION, SOLUTION INTRAMUSCULAR; INTRAVENOUS at 07:56

## 2025-05-13 RX ADMIN — HYDROMORPHONE HYDROCHLORIDE 0.25 MG: 1 INJECTION, SOLUTION INTRAMUSCULAR; INTRAVENOUS; SUBCUTANEOUS at 13:03

## 2025-05-13 RX ADMIN — ROCURONIUM BROMIDE 80 MG: 10 INJECTION INTRAVENOUS at 08:20

## 2025-05-13 RX ADMIN — KETOROLAC TROMETHAMINE 30 MG: 30 INJECTION, SOLUTION INTRAMUSCULAR at 17:53

## 2025-05-13 RX ADMIN — ACETAMINOPHEN 1000 MG: 1000 INJECTION INTRAVENOUS at 08:53

## 2025-05-13 RX ADMIN — FAMOTIDINE 20 MG: 10 INJECTION INTRAVENOUS at 07:56

## 2025-05-13 RX ADMIN — SODIUM CHLORIDE 50 ML/HR: 9 INJECTION, SOLUTION INTRAVENOUS at 17:51

## 2025-05-13 RX ADMIN — ONDANSETRON 4 MG: 2 INJECTION, SOLUTION INTRAMUSCULAR; INTRAVENOUS at 11:25

## 2025-05-13 RX ADMIN — SODIUM CHLORIDE, POTASSIUM CHLORIDE, SODIUM LACTATE AND CALCIUM CHLORIDE: 600; 310; 30; 20 INJECTION, SOLUTION INTRAVENOUS at 08:12

## 2025-05-13 RX ADMIN — MIDAZOLAM 0.5 MG: 1 INJECTION INTRAMUSCULAR; INTRAVENOUS at 07:58

## 2025-05-13 RX ADMIN — ROCURONIUM BROMIDE 30 MG: 10 INJECTION INTRAVENOUS at 09:22

## 2025-05-13 NOTE — PLAN OF CARE
Goal Outcome Evaluation:  Plan of Care Reviewed With: patient        Progress: no change  Outcome Evaluation: Admit from PACU, s/p colon resection with ileostomy placement. Patient c/o back pain up arrival to unit, unable to get comfortable. Assist x2 to chair with some relief. ERAS protocol. Ice pack to abd. Family at bedside upon arriving to unit.

## 2025-05-13 NOTE — OP NOTE
Surgeon: Marcos Rowe MD    Surgical  Assistant: Rosalia Mario PA-C     Preoperative diagnosis: Cancer of sigmoid [C18.7]    Post-Op Diagnosis Codes:     * Cancer of sigmoid [C18.7]    Procedure: COLON RESECTION LOW ANTERIOR LAPAROSCOPIC WITH DAVINCI ROBOT AND DIVERTING LOOP ILEOSTOMY, * Panel 2 does not exist *    Estimated Blood Loss: 100ml    Specimens:   Specimens       ID Source Type Tests Collected By Collected At Frozen?    A Lymph Node Tissue TISSUE PATHOLOGY EXAM   Marcos Rowe MD 5/13/25 0955 No    Description: DAVID AORTIC LYMPH NODE    B Large Intestine, Left / Descending Colon Tissue TISSUE PATHOLOGY EXAM   Marcos Rowe MD 5/13/25 1050 No    Description: PROXIMAL MARGIN DESCENDING COLON    C Large Intestine, Rectum Tissue TISSUE PATHOLOGY EXAM   Marcos Rowe MD 5/13/25 1051 No    Description: DESCENDING MARGIN RECTUM    D Large Intestine, Sigmoid Colon Tissue TISSUE PATHOLOGY EXAM   Marcos Rowe MD 5/13/25 1109 No    Description: UPPER RECTUM AND SIGMOID           Order Name Source Comment Collection Info Order Time   TISSUE PATHOLOGY EXAM Lymph Node  Collected By: Marcos Rowe MD 5/13/2025  9:56 AM     Release to patient   Routine Release            Indication:  Jeanine Guzman is a 69 y.o. female who comes in with Cancer of sigmoid  Patient understands risks, benefits,and alternatives wishes to proceed.      Procedure Details:    Patient was brought to the operating room, SCD's in place, antibiotics infused. After general anesthesia was achieved the patient had a TAP block done by anesthesia.  The patient was then placed in Yellofins lithotomy and prepped and draped in sterile fashion. Marcaine 0.25% with epinephrine was used as a local infiltration at the trocar sites. First incision was made in the left upper quadrant midclavicular line right below the costal margin and Veress needle was used to gain access into the abdomen and tried to insufflate up to 15 mmHg.  There  was a high pressure reading and it said that the Veress needle was occluded.  I then tried at the umbilicus and encountered the same.  I tried in the right upper quadrant midclavicular line under the ribs and was not able to get the abdomen inflated.  I then chose to cut down at the umbilicus and did an open Brunson type approach.  A 8 mm trocar was placed in the abdomen under direct visualization, I inspected the abdomen and there did not appear to be any peritoneal disease.  I inspected the right and the left upper quadrant of the abdominal wall and there was no piercing the posterior fascia or peritoneum.  It appears I was never in the abdomen with the various needle instead try was in the fat layer.  I then went ahead and placed 3 other trocars in a diagonal down to a few centimeters away from the right ASIS.  I made the second right lower quadrant incision at the ostomy site larger to fit the small GelPort so that we could put the anvil in the abdomen later and this will be at the extraction site.  I then placed an assistant port in the right midquadrant.  I put the patient in steep Trendelenburg 6 degrees tilted to the right and docked the robot. I placed all instruments under direct visualization and then I went to the console.    The majority of the sigmoid colon was attached to the anterior abdominal wall.  The tattoo was at the rectosigmoid junction with a very redundant sigmoid colon.  I sigmoid colon started dissecting the colon off the anterior abdominal wall.  I did this all sharply.  Once I got the sigmoid colon mobilized, I lifted up the sigmoid colon and found the THIEN and started dissecting distal to this and found the left ureter and got into the retrorectal space and followed the space down to the mid rectum.  I then incised the anterior peritoneum along with the lateral sides also, always confirming the position of the left ureter.  Once I got as much of the dissection done with the scissors, I  then exchanged them out for the vessel sealer.  I did a high ligation of the THIEN with multiple burns of the vessel sealer.  Since the patient has a history of ischemic colitis was concerned about the THIEN bleeding with a higher blood pressure.  I had the assistant bring in a 2-0 PDS Endoloop and ligated the THIEN at the takeoff of the aorta.  There was a tattoo stained lymph node superior to the THIEN takeoff.  I dissected this off using sharp dissection.  I sent it off as lymph node.  I started taking the lateral stalk with the vessel sealer.  I got the posterior mesentery of the rectum distal to the tattoo and used the vessel sealer to take that.  Once I got it cleaned off I used 2 fires of green load 45 stapler to go anterior to posterior with stapler to take to the distal margin.  I then tried cleaning off the mesentery of the junction of the descending colon to sigmoid as my proximal margin.  I gave ICG and just distal to the transition point made a colotomy.  I did a small burn in the anterior colon to bring the anvil out.  I then closed the colotomy with 2-0 Vicryl and stapled just proximal to this and then put the colon specimen in the right lower quadrant.   The assistant went below and brought up the small, medium and then the large EEA sizers.  She then brought up the 28 mm circular stapler.  The assistant open the stapler and we brought the pin out just lateral to the staple line.  I brought the anvil down to the pin and connected it.  I made sure there is no twist in the mesentery of the small bowel.  The assistant closed the stapler and paused for compression.  The stapler was fired.  The stapler was then opened and the anastomotic rings were inspected on the back table and they were circumferential.  We tested the anastomosis by blowing air into the rectum with a rigid sigmoidoscope and I occluded the lumen of the descending colon several centimeters above the anastomosis and sunk the anastomosis under  irrigation fluid.  There was no evidence of leak.  We placed 10 mL of Tisseel around the staple lines. I then found the terminal ileum and the ileocecal valve only 30 cm proximal to this.   I used umbilical tape across the mesentery of the small bowel to bring up a loop of ileum to be the diverting loop ileostomy.  I undocked the robot and went back to the patient and brought the specimen out through the Gelport and then the ileum.  I then closed the 12 trocar fascia site with 2-0 Vicryl.  All skin was closed with 4-0 Monocryl.  I then Brooked the ileostomy with 3-0 Vicryl.  All instrument, lap counts and needle counts were correct. The patient was stable throughout the entire case and was taken to recovery.    Synoptic portion:    The indication for total mesorectum excision was rectosigmoid cancer with curative intent.    Assistant: Rosalia Mario PA-C was responsible for performing the following activities: Retraction, Suction, Irrigation, Suturing, Closing and Placing Dressing and their skilled assistance was necessary for the success of this case

## 2025-05-13 NOTE — ED NOTES
Pt here for nausea and diarrhea. Due to have colon resection this morning, supposed to get to pre-op @ 0630. Pt very anxious. Denies cp or soa, just states abdominal pain from bowel prep.

## 2025-05-13 NOTE — ED PROVIDER NOTES
I have personally performed a face-to-face diagnostic evaluation of the patient.  I have reviewed and agree with the care plan as outlined by NP/PA.  My findings are as follows:    HPI:  Patient is a 69 y.o. female who presents with complaints of nausea, vomiting and diarrhea.  Patient is currently undergoing a prep for a sigmoid colon resection that is scheduled for this morning.  She has been taking neomycin, erythromycin as well as the prep.  She has been having dry heaving, diarrhea, but denies any fever, abdominal pain or feeling poorly prior to starting the prep.      PE:  Physical Exam  Constitutional:       Appearance: Normal appearance. She is ill-appearing.   HENT:      Head: Normocephalic and atraumatic.   Eyes:      Pupils: Pupils are equal, round, and reactive to light.   Cardiovascular:      Rate and Rhythm: Normal rate and regular rhythm.      Heart sounds: No murmur heard.  Abdominal:      Tenderness: There is no abdominal tenderness. There is no guarding or rebound.   Skin:     General: Skin is warm.   Neurological:      Mental Status: She is alert and oriented to person, place, and time.           MDM:  I provided a substantiate portion of the care of this patient. I personally performed the medical decision making.    Medical records are reviewed in [facility] Everywhere, if applicable      Recent Results (from the past 24 hours)   Comprehensive Metabolic Panel    Collection Time: 05/13/25  3:05 AM    Specimen: Arm, Right; Blood   Result Value Ref Range    Glucose 105 (H) 65 - 99 mg/dL    BUN 18 8 - 23 mg/dL    Creatinine 0.86 0.57 - 1.00 mg/dL    Sodium 134 (L) 136 - 145 mmol/L    Potassium 4.1 3.5 - 5.2 mmol/L    Chloride 101 98 - 107 mmol/L    CO2 18.7 (L) 22.0 - 29.0 mmol/L    Calcium 10.0 8.6 - 10.5 mg/dL    Total Protein 7.9 6.0 - 8.5 g/dL    Albumin 4.8 3.5 - 5.2 g/dL    ALT (SGPT) 13 1 - 33 U/L    AST (SGOT) 19 1 - 32 U/L    Alkaline Phosphatase 99 39 - 117 U/L    Total Bilirubin 1.2  0.0 - 1.2 mg/dL    Globulin 3.1 gm/dL    A/G Ratio 1.5 g/dL    BUN/Creatinine Ratio 20.9 7.0 - 25.0    Anion Gap 14.3 5.0 - 15.0 mmol/L    eGFR 73.2 >60.0 mL/min/1.73   Lipase    Collection Time: 05/13/25  3:05 AM    Specimen: Arm, Right; Blood   Result Value Ref Range    Lipase 21 13 - 60 U/L   CBC Auto Differential    Collection Time: 05/13/25  3:05 AM    Specimen: Arm, Right; Blood   Result Value Ref Range    WBC 9.64 3.40 - 10.80 10*3/mm3    RBC 4.85 3.77 - 5.28 10*6/mm3    Hemoglobin 13.5 12.0 - 15.9 g/dL    Hematocrit 41.0 34.0 - 46.6 %    MCV 84.5 79.0 - 97.0 fL    MCH 27.8 26.6 - 33.0 pg    MCHC 32.9 31.5 - 35.7 g/dL    RDW 12.8 12.3 - 15.4 %    RDW-SD 39.5 37.0 - 54.0 fl    MPV 9.2 6.0 - 12.0 fL    Platelets 437 140 - 450 10*3/mm3    Neutrophil % 71.8 42.7 - 76.0 %    Lymphocyte % 17.4 (L) 19.6 - 45.3 %    Monocyte % 7.3 5.0 - 12.0 %    Eosinophil % 3.1 0.3 - 6.2 %    Basophil % 0.2 0.0 - 1.5 %    Immature Grans % 0.2 0.0 - 0.5 %    Neutrophils, Absolute 6.92 1.70 - 7.00 10*3/mm3    Lymphocytes, Absolute 1.68 0.70 - 3.10 10*3/mm3    Monocytes, Absolute 0.70 0.10 - 0.90 10*3/mm3    Eosinophils, Absolute 0.30 0.00 - 0.40 10*3/mm3    Basophils, Absolute 0.02 0.00 - 0.20 10*3/mm3    Immature Grans, Absolute 0.02 0.00 - 0.05 10*3/mm3    nRBC 0.0 0.0 - 0.2 /100 WBC     I have reviewed the above labs    This is a 69-year-old female who is presenting with complaints of dry heaves, diarrhea and overall feeling poorly after starting a colon prep for her sigmoid resection today.  She overall looks well.  She presents afebrile with unremarkable vital signs.  She has absolutely no tenderness to palpation of the abdomen.    I do suspect her symptoms are related to this prep.  She was evaluated with labs, which are reviewed and overall benign.  In the setting of her benign abdominal exam with no tenderness, I do not think we need to proceed with cross-sectional imaging today.    Was treated with fluids, antiemetics and  symptomatically improved in the emergency department.  We will send her up to preop and see if we can get her surgery accomplished today.       Impression:  Final diagnoses:   Nausea vomiting and diarrhea         Disposition:  ED Disposition       ED Disposition   Discharge    Condition   Stable    Comment   --                Iza Hoffmann MD  05/13/25 0609

## 2025-05-13 NOTE — ANESTHESIA PROCEDURE NOTES
Airway  Reason: elective    Date/Time: 5/13/2025 8:24 AM  Airway not difficult    General Information and Staff    Patient location during procedure: OR  Anesthesiologist: Mely Tran MD  CRNA/CAA: Leigha Barillas CRNA    Indications and Patient Condition  Indications for airway management: airway protection    Preoxygenated: yes  MILS maintained throughout    Mask difficulty assessment: 0 - not attempted    Final Airway Details    Final airway type: endotracheal airway      Successful airway: ETT  Cuffed: yes   Successful intubation technique: direct laryngoscopy  Adjuncts used in placement: anterior pressure/BURP, cricoid pressure and Bougie  Endotracheal tube insertion site: oral  Blade: CMAC  Blade size: D  ETT size (mm): 7.0  Cormack-Lehane Classification: grade I - full view of glottis  Placement verified by: chest auscultation and capnometry   Cuff volume (mL): 8  Measured from: lips  ETT/EBT  to lips (cm): 21  Number of attempts at approach: 1  Assessment: lips, teeth, and gum same as pre-op and atraumatic intubation    Additional Comments  Pt preoxygenated, RSI with cricoid pressure d/t pt's n/v.  Eschmann used with CMAC, ATETI, dentition as before

## 2025-05-13 NOTE — ANESTHESIA PROCEDURE NOTES
Peripheral Block      Patient location during procedure: holding area  Start time: 5/13/2025 8:24 AM  Reason for block: at surgeon's request and post-op pain management  Performed by  Anesthesiologist: Mely Tran MD  Preanesthetic Checklist  Completed: patient identified, IV checked, site marked, risks and benefits discussed, surgical consent, monitors and equipment checked, pre-op evaluation and timeout performed  Prep:  Pt Position: supine  Sterile barriers:cap, gloves and mask  Prep: ChloraPrep  Patient monitoring: blood pressure monitoring, continuous pulse oximetry and EKG  Procedure    Sedation: yes  Performed under: general  Guidance:ultrasound guided    Laterality:Bilateral  Block Type:TAP  Injection Technique:single-shot  Needle Type:echogenic      Medications Used: bupivacaine liposome (EXPAREL) 1.3 % injection - Infiltration   10 mL - 5/13/2025 8:28:00 AM  bupivacaine PF (MARCAINE) 0.25 % injection - Injection   15 mL - 5/13/2025 8:28:00 AM      Post Assessment  Injection Assessment: negative aspiration for heme, no paresthesia on injection and incremental injection  Patient Tolerance:comfortable throughout block  Complications:no  Additional Notes  Divided dosing equal amts bilateral  Performed by: Mely Tran MD

## 2025-05-13 NOTE — ED NOTES
Spoke with patricia Mills, in pre/post - pt to be discharged from ER, mila to take to registration for surgery.  Will leave IV in place for surgery this am.

## 2025-05-13 NOTE — BRIEF OP NOTE
COLON RESECTION LOW ANTERIOR LAPAROSCOPIC WITH DAVINCI ROBOT  Progress Note    Jeanine Guzman  5/13/2025    Pre-op Diagnosis:   Cancer of sigmoid [C18.7]       Post-Op Diagnosis Codes:     * Cancer of sigmoid [C18.7]    Procedure(s):      Procedure(s):  COLON RESECTION LOW ANTERIOR LAPAROSCOPIC WITH DAVINCI ROBOT AND DIVERTING LOOP ILEOSTOMY              Surgeon(s):  Marcos Rowe MD    Anesthesia: General with Block    Staff:   Circulator: Maggie Gonzalez RN; Maddie Phelan RN  Scrub Person: Joslyn Vazquez; Maria Elena Anderson; Manny Almaguer  Assistant: Rosalia Mario PA-C  Assistant: Rosalia Mario PA-C    Estimated Blood Loss: 100ml    Urine Voided: 580 mL    Specimens:                Specimens       ID Source Type Tests Collected By Collected At Frozen?    A Lymph Node Tissue TISSUE PATHOLOGY EXAM   Marcos Rowe MD 5/13/25 0955 No    Description: DAVID AORTIC LYMPH NODE    B Large Intestine, Left / Descending Colon Tissue TISSUE PATHOLOGY EXAM   Marcos Rowe MD 5/13/25 1050 No    Description: PROXIMAL MARGIN DESCENDING COLON    C Large Intestine, Rectum Tissue TISSUE PATHOLOGY EXAM   Marcos Rowe MD 5/13/25 1051 No    Description: DESCENDING MARGIN RECTUM    D Large Intestine, Sigmoid Colon Tissue TISSUE PATHOLOGY EXAM   Marcos Rowe MD 5/13/25 1109 No    Description: UPPER RECTUM AND SIGMOID              Drains:   Ileostomy Loop RLQ (Active)       Urethral Catheter Temperature probe 16 Fr. (Active)       Findings:       Complications:   none    Marcos Rowe MD     Date: 5/13/2025  Time: 12:03 EDT

## 2025-05-13 NOTE — ANESTHESIA POSTPROCEDURE EVALUATION
Patient: Jeanine Guzman    Procedure Summary       Date: 05/13/25 Room / Location: [facility] OR [address] [place] [address] MAIN OR    Anesthesia Start: 0812 Anesthesia Stop: 1159    Procedure: COLON RESECTION LOW ANTERIOR LAPAROSCOPIC WITH DAVINCI ROBOT AND DIVERTING LOOP ILEOSTOMY (Abdomen) Diagnosis:       Cancer of sigmoid      (Cancer of sigmoid [C18.7])    Surgeons: Marcos Rowe MD Provider: Mely Tran MD    Anesthesia Type: general with block ASA Status: 3 - Emergent            Anesthesia Type: general with block    Vitals  Vitals Value Taken Time   /70 05/13/25 13:00   Temp 36.3 °C (97.4 °F) 05/13/25 11:55   Pulse 52 05/13/25 13:00   Resp 16 05/13/25 13:00   SpO2 100 % 05/13/25 13:00           Post Anesthesia Care and Evaluation    Anesthetic complications: No anesthetic complications

## 2025-05-13 NOTE — ANESTHESIA PREPROCEDURE EVALUATION
Anesthesia Evaluation     history of anesthetic complications:  PONV               Airway   Mallampati: III  TM distance: >3 FB  Neck ROM: full  Dental      Comment: Bridge across top    Pulmonary    (-) shortness of breath, recent URI, not a smoker  Cardiovascular     (+) hypertension, hyperlipidemia  (-) dysrhythmias    ROS comment: NSR, good EF, no ischemia    Neuro/Psych  (+) headaches  (-) TIA, no Parkinson's disease  GI/Hepatic/Renal/Endo    (+) GERD, GI bleeding   (-) no renal disease, diabetes    ROS Comment: Ischemic colitis, Colon Ca currently nauseated and vomiting    Musculoskeletal     Abdominal    Substance History      OB/GYN          Other                      Anesthesia Plan    ASA 3 - emergent     general with block     intravenous induction     Anesthetic plan, risks, benefits, and alternatives have been provided, discussed and informed consent has been obtained with: patient.    CODE STATUS:

## 2025-05-13 NOTE — ED PROVIDER NOTES
EMERGENCY DEPARTMENT ENCOUNTER  Room Number:  01/01  PCP: Kori Patel DO  Independent Historians: Patient      HPI:  Chief Complaint: had concerns including Vomiting, Nausea, and Diarrhea.     A complete HPI/ROS/PMH/PSH/SH/FH are unobtainable due to: None    Chronic or social conditions impacting patient care (Social Determinants of Health): None      Context: Jeanine Guzman is a 69 y.o. female with a medical history of adenocarcinoma of the sigmoid colon presents emergency department today with nausea and diarrhea that has been ongoing since 1 AM.  Patient has been prepping for her colon resection that is scheduled for 8:00 in the morning.  She is taking neomycin and erythromycin and is also doing a colonoscopy prep prior to her sigmoid colon surgery and says around 1 AM she just started dry heaving and has not been able to stop.  She is not actually vomiting because she has not eaten or drink anything.  She is having loose stools secondary to the prep.  She denies fever or chills.  She denies feeling unwell prior to starting the prep.  She denies urinary symptoms.      Review of prior external notes (non-ED) -and- Review of prior external test results outside of this encounter:   Patient is scheduled to have a laparoscopic sigmoid resection with possible diverting loop ileostomy due to cancer of the sigmoid colon performed by Dr. Marcos Rowe today.  I reviewed the office visit with Dr. Rowe on 5/5/2025, patient is prescribed neomycin and erythromycin to be taken the day prior to surgery.  She is also to do a bowel prep.    Patient was admitted to the hospital on 4/9/2025 and discharged on 4/16/2025 for abdominal pain and rectal bleeding.  She was found to have possible colitis on imaging was started on IV antibiotics.  She was seen in consultation by GI and colonoscopy was performed which did reveal colitis which looked ischemic in nature as well as a sigmoid colon mass.  Stool studies were positive for  C. difficile toxin but not for the antigen.  Initially oral vancomycin was started but given lack of pseudomembranes on endoscopy the vancomycin was discontinued.  Pathology from colonoscopy was found to be invasive adenocarcinoma.  Additional workup was completed for staging including CT of the chest which showed no obvious metastasis.    I reviewed labs from 5/9/2025, hemoglobin 13.4, creatinine 0.86      PAST MEDICAL HISTORY  Active Ambulatory Problems     Diagnosis Date Noted    Colitis 04/09/2025    BRBPR (bright red blood per rectum) 04/09/2025    Diarrhea 04/09/2025    Rectal mass 04/16/2025    Cancer of sigmoid 05/05/2025     Resolved Ambulatory Problems     Diagnosis Date Noted    No Resolved Ambulatory Problems     Past Medical History:   Diagnosis Date    Allergic     Anxiety     Arthritis     Colon cancer 04/2025    Colon polyp 11/11/2022    Diverticulitis of colon 2010    Diverticulosis 2010    GERD (gastroesophageal reflux disease) 2016    Glaucoma 2018    Headache     History of chest pain 04/2025    History of Clostridium difficile infection     History of COVID-19     History of kidney stones     History of migraine     History of UTI     Hyperlipidemia October 2022    Hypertension     Inflammatory bowel disease 04/11/25    Irritable bowel syndrome 2017    PONV (postoperative nausea and vomiting) 1990    Rectal bleeding 2024         PAST SURGICAL HISTORY  Past Surgical History:   Procedure Laterality Date    APPENDECTOMY  1958    COLONOSCOPY  11/11/22    COLONOSCOPY N/A 04/11/2025    Procedure: COLONOSCOPY TO CECUM WITH BIOPSY AND SPOT INK MARKER; POLYPECTOMY ( COLD SNARE);  Surgeon: Juan Jose Horner MD;  Location: [facility] ENDOSCOPY;  Service: Gastroenterology;  Laterality: N/A;  COLITIS; DIARRHEA  POST:HEMORRHOIDS; DIVERTICULOSIS; SIGMOID MASS; COLITIS; COLON POLYP    EYE SURGERY  2019    Cataracts    FRACTURE SURGERY  2010    Broken wrist    HYSTERECTOMY           FAMILY HISTORY  Family History    Problem Relation Age of Onset    Heart disease Mother     Cancer Father     Hyperlipidemia Sister     Kidney disease Sister     Malig Hyperthermia Neg Hx          SOCIAL HISTORY  Social History     Socioeconomic History    Marital status:      Spouse name: Bret    Number of children: 0   Tobacco Use    Smoking status: Never     Passive exposure: Never    Smokeless tobacco: Never   Vaping Use    Vaping status: Never Used   Substance and Sexual Activity    Alcohol use: Not Currently     Comment: RARELY    Drug use: Never    Sexual activity: Not Currently     Partners: Male     Comment: Not applicable         ALLERGIES  Latex, Statins, Morphine, and Sulfa antibiotics      REVIEW OF SYSTEMS  Included in HPI  All systems reviewed and negative except for those discussed in HPI.      PHYSICAL EXAM    I have reviewed the triage vital signs and nursing notes.    ED Triage Vitals   Temp Heart Rate Resp BP SpO2   05/13/25 0234 05/13/25 0234 05/13/25 0234 05/13/25 0236 05/13/25 0234   97.1 °F (36.2 °C) 111 24 120/60 100 %      Temp src Heart Rate Source Patient Position BP Location FiO2 (%)   -- -- 05/13/25 0236 05/13/25 0236 --     Sitting Right arm        Physical Exam  Constitutional:       General: She is not in acute distress.     Comments: Appears to feel unwell but nontoxic   HENT:      Head: Normocephalic and atraumatic.      Nose: Nose normal.      Mouth/Throat:      Mouth: Mucous membranes are moist.   Eyes:      Extraocular Movements: Extraocular movements intact.      Pupils: Pupils are equal, round, and reactive to light.   Cardiovascular:      Rate and Rhythm: Regular rhythm. Tachycardia present.      Pulses: Normal pulses.      Heart sounds: Normal heart sounds.   Pulmonary:      Effort: Pulmonary effort is normal. No respiratory distress.      Breath sounds: Normal breath sounds.   Abdominal:      General: Abdomen is flat. There is no distension.      Palpations: Abdomen is soft.      Tenderness:  There is no abdominal tenderness. There is no guarding or rebound.   Musculoskeletal:         General: Normal range of motion.      Cervical back: Normal range of motion and neck supple.   Skin:     General: Skin is warm and dry.      Capillary Refill: Capillary refill takes less than 2 seconds.   Neurological:      General: No focal deficit present.      Mental Status: She is alert and oriented to person, place, and time.   Psychiatric:         Mood and Affect: Mood normal.         Behavior: Behavior normal.         LAB RESULTS  Recent Results (from the past 24 hours)   Comprehensive Metabolic Panel    Collection Time: 05/13/25  3:05 AM    Specimen: Arm, Right; Blood   Result Value Ref Range    Glucose 105 (H) 65 - 99 mg/dL    BUN 18 8 - 23 mg/dL    Creatinine 0.86 0.57 - 1.00 mg/dL    Sodium 134 (L) 136 - 145 mmol/L    Potassium 4.1 3.5 - 5.2 mmol/L    Chloride 101 98 - 107 mmol/L    CO2 18.7 (L) 22.0 - 29.0 mmol/L    Calcium 10.0 8.6 - 10.5 mg/dL    Total Protein 7.9 6.0 - 8.5 g/dL    Albumin 4.8 3.5 - 5.2 g/dL    ALT (SGPT) 13 1 - 33 U/L    AST (SGOT) 19 1 - 32 U/L    Alkaline Phosphatase 99 39 - 117 U/L    Total Bilirubin 1.2 0.0 - 1.2 mg/dL    Globulin 3.1 gm/dL    A/G Ratio 1.5 g/dL    BUN/Creatinine Ratio 20.9 7.0 - 25.0    Anion Gap 14.3 5.0 - 15.0 mmol/L    eGFR 73.2 >60.0 mL/min/1.73   Lipase    Collection Time: 05/13/25  3:05 AM    Specimen: Arm, Right; Blood   Result Value Ref Range    Lipase 21 13 - 60 U/L   CBC Auto Differential    Collection Time: 05/13/25  3:05 AM    Specimen: Arm, Right; Blood   Result Value Ref Range    WBC 9.64 3.40 - 10.80 10*3/mm3    RBC 4.85 3.77 - 5.28 10*6/mm3    Hemoglobin 13.5 12.0 - 15.9 g/dL    Hematocrit 41.0 34.0 - 46.6 %    MCV 84.5 79.0 - 97.0 fL    MCH 27.8 26.6 - 33.0 pg    MCHC 32.9 31.5 - 35.7 g/dL    RDW 12.8 12.3 - 15.4 %    RDW-SD 39.5 37.0 - 54.0 fl    MPV 9.2 6.0 - 12.0 fL    Platelets 437 140 - 450 10*3/mm3    Neutrophil % 71.8 42.7 - 76.0 %    Lymphocyte %  17.4 (L) 19.6 - 45.3 %    Monocyte % 7.3 5.0 - 12.0 %    Eosinophil % 3.1 0.3 - 6.2 %    Basophil % 0.2 0.0 - 1.5 %    Immature Grans % 0.2 0.0 - 0.5 %    Neutrophils, Absolute 6.92 1.70 - 7.00 10*3/mm3    Lymphocytes, Absolute 1.68 0.70 - 3.10 10*3/mm3    Monocytes, Absolute 0.70 0.10 - 0.90 10*3/mm3    Eosinophils, Absolute 0.30 0.00 - 0.40 10*3/mm3    Basophils, Absolute 0.02 0.00 - 0.20 10*3/mm3    Immature Grans, Absolute 0.02 0.00 - 0.05 10*3/mm3    nRBC 0.0 0.0 - 0.2 /100 WBC           MEDICATIONS GIVEN IN ER  Medications   lactated ringers bolus 1,000 mL (1,000 mL Intravenous New Bag 5/13/25 9842)   ondansetron (ZOFRAN) injection 4 mg (4 mg Intravenous Given 5/13/25 9387)         OUTPATIENT MEDICATION MANAGEMENT:  No current Epic-ordered facility-administered medications on file.     Current Outpatient Medications Ordered in Epic   Medication Sig Dispense Refill    amLODIPine (NORVASC) 5 MG tablet Take 1 tablet by mouth Daily. 30 tablet 0    ASHWAGANDHA PO Take 300 mg by mouth Daily. PT HOLDING FOR SURGERY      B Complex Vitamins (vitamin b complex) capsule capsule Take 1 capsule by mouth Daily. PT HOLDING FOR SURGERY      benazepril (LOTENSIN) 40 MG tablet Take 1 tablet by mouth Daily. (Patient taking differently: Take 1 tablet by mouth Daily. HOLD 24 HR, HOLD DOS) 30 tablet 0    Chlorhexidine Gluconate 4 % solution Apply 1 Application topically to the appropriate area as directed 2 (Two) Times a Day. Shower With Hibiclens Solution Twice The Day Before Surgery 236 mL 0    Erythromycin 500 MG EC tablet Take 2 tablets by mouth See Admin Instructions. 2 pills by mouth @ 2 pm , 3 pm , and 10 pm the day before surgery 6 tablet 0    esomeprazole (nexIUM) 40 MG capsule Take 1 capsule by mouth Every Morning Before Breakfast. 90 capsule 1    hyoscyamine (ANASPAZ,LEVSIN) 0.125 MG tablet Take 1 tablet by mouth Every 6 (Six) Hours As Needed for Diarrhea. 90 tablet 0    lactobacillus acidophilus (RISAQUAD) capsule  capsule Take 1 capsule by mouth Daily.      neomycin (MYCIFRADIN) 500 MG tablet Take 2 tablets by mouth See Admin Instructions. 2 pills by mouth @ 2 pm , 3 pm , and 10 pm the day before surgery 6 tablet 0    Plant Sterols and Stanols (CHOLESTOFF PO) Take 1 tablet by mouth Daily. PT HOLDING FOR SURGERY      polycarbophil 625 MG tablet tablet Take 1 tablet by mouth 2 (Two) Times a Day. (Patient taking differently: Take 1 tablet by mouth 2 (Two) Times a Day As Needed.) 60 tablet 0    travoprost, BAK free, (TRAVATAN) 0.004 % solution ophthalmic solution Administer 0.004 drops to both eyes Every Night.      trimethoprim (TRIMPEX) 100 MG tablet TAKE 1 TABLET BY MOUTH DAILY. (Patient taking differently: Take 1 tablet by mouth Daily As Needed. ONLY TAKES IF HAS UTI) 30 tablet 6             PROGRESS, DATA ANALYSIS, CONSULTS, AND MEDICAL DECISION MAKING  ORDERS PLACED DURING THIS VISIT:  Orders Placed This Encounter   Procedures    Comprehensive Metabolic Panel    Lipase    CBC Auto Differential    Patient Isolation Contact Spore    CBC & Differential       All labs have been independently interpreted by me.  All radiology studies have been reviewed by me. All EKG's have been independently viewed and interpreted by me.  Discussion below represents my analysis of pertinent findings related to patient's condition, differential diagnosis, treatment plan and final disposition.    Differential diagnosis includes but is not limited to:   My differential diagnosis for nausea vomiting includes but is not limited to:  Medications, toxins, ethanol abuse, radiation, hypervitaminosis, jamacian vomiting sickness, gastroenteritis, otitis media, gastric outlet obstruction, SBO, function GI disorders, gastroparesis, chronic intestinal pseudoobstruction, nonulcer dyspepsia, irritable bowel syndrome, pancreatic adenocarcinoma, inflammatory intraperitoneal disease, peptic ulcer disease, cholecystitis, pancreatitis, hepatitis, Crohn's disease,  mesenteric ischemia, retroperitoneal fibrosis, mucosal metastases, migraine, increased intracranial pressure, seizures, demyelinating disorders, emotional response, psychiatric causes, motion sickness, labyrinthitis, brain tumors, Ménière's disease, pregnancy, uremia, diabetic ketoacidosis, hyperparathyroidism, hypoparathyroidism, hypothyroidism, Washakie's disease, acute intermittent porphyria, postoperative nausea vomiting, cyclic vomiting syndrome, cannabinol hyperemesis syndrome, MI, starvation      ED Course:  ED Course as of 05/13/25 0537   Tue May 13, 2025   0242 I discussed the case with Dr. Hoffmann and she agrees to evaluate the patient at the bedside.    [CC]   0318 WBC: 9.64 [CC]   0318 Hemoglobin: 13.5 [CC]   0340 Potassium: 4.1 [CC]   0340 Creatinine: 0.86 [CC]   0435 Rechecked on patient: She is feeling better.  She is due to be in preop at 0630 for a surgical procedure.  She is supposed to do a chlorhexidine shower prior to surgery.  Will provide bed bath for the patient prior to taking up to preop.  I did offer to discharge her home so she can go home and take a shower but she would prefer to just stay until surgery. [CC]      ED Course User Index  [CC] Susy Mota PA-C           AS OF 05:37 EDT VITALS:    BP - 120/60  HR - 111  TEMP - 97.1 °F (36.2 °C)  O2 SATS - 100%        MDM:  Patient is a 69-year-old female presents emergency department today with nausea, vomiting, diarrhea that started after taking her prep prior to surgery today.  Patient on arrival was mildly tachycardic with vitals otherwise reassuring, she is afebrile.  On my exam patient initially appeared to feel unwell but nontoxic patient improved while here in the emergency department with treatment of IV fluids and antiemetics.   She is taking a hefty dose of antibiotics as well as a GI prep for a colon resection secondary to a mass in the sigmoid colon.  Shortly after taking the medication she became sick to her stomach and  started having nausea, vomiting, diarrhea.  Diarrhea is to be expected given that she did take a colonoscopy prep. It  Is also not unusual for people to have nausea and vomiting after taking high doses of antibiotics and the colonoscopy prep.  Patient does have a history of a positive C. difficile toxin but the antigen was negative.  I did consider retesting today but given the diarrhea started after the prep we will hold on further testing.  Also considered CT of the abdomen pelvis but abdomen is generally soft and nontender.   given reassuring labs, vital signs, and exam we will hold on CT.  I do not suspect a surgical abdomen. White blood cell count is normal, potassium is normal, creatinine is normal.  On reevaluation patient is feeling much better and I do believe she is okay to proceed with surgery today.  Patient was given a chlorhexidine bath here in the ED as this was part of her preoperative instructions.  Patient will be taken up to the preoperative area to register for surgery.  She is stable at time of discharge.        DIAGNOSIS  Final diagnoses:   Nausea vomiting and diarrhea         DISPOSITION  ED Disposition       ED Disposition   Discharge    Condition   Stable    Comment   --                  Please note that portions of this document were completed with a voice recognition program.    Note Disclaimer: At [facility], we believe that sharing information builds trust and better relationships. You are receiving this note because you recently visited [facility]. It is possible you will see health information before a provider has talked with you about it. This kind of information can be easy to misunderstand. To help you fully understand what it means for your health, we urge you to discuss this note with your provider.     Susy Mota PA-C  05/13/25 0540       Susy Mota PA-C  05/13/25 0542

## 2025-05-13 NOTE — DISCHARGE PLACEMENT REQUEST
"Jeanine Guzman \"ELIZABETH\" (69 y.o. Female)       Date of Birth   1955    Social Security Number       Address   [address] [place] [address]    Home Phone   [phone]    MRN   [id number]       Congregation   None    Marital Status                               Admission Date   5/13/2025    Admission Type   Elective    Admitting Provider   Marcos Rowe MD    Attending Provider   Marcos Rowe MD    Department, Room/Bed   [address], E454/1       Discharge Date       Discharge Disposition       Discharge Destination                                 Attending Provider: Marcos Rowe MD    Allergies: Latex, Statins, Morphine, Sulfa Antibiotics    Isolation: None   Infection: None   Code Status: CPR    Ht: 162.6 cm (64\")   Wt: 69.9 kg (154 lb)    Admission Cmt: None   Principal Problem: Cancer of sigmoid [C18.7]                   Active Insurance as of 5/13/2025       Primary Coverage       Payor Plan Insurance Group Employer/Plan Group    MEDICARE MEDICARE A & B        Payor Plan Address Payor Plan Phone Number Payor Plan Fax Number Effective Dates    [address] [phone]  7/1/2020 - None Entered    [facility] [id number]         Subscriber Name Subscriber Birth Date Member ID       JEANINE GUZMAN 1955 [id number]               Secondary Coverage       Payor Plan Insurance Group Employer/Plan Group    LUMICO LUMICO [id number]       Payor Plan Address Payor Plan Phone Number Payor Plan Fax Number Effective Dates    [address] [address]   1/1/2025 - None Entered    [facility] [id number]         Subscriber Name Subscriber Birth Date Member ID       JEANINE GUZMAN 1955 [id number]                     Emergency Contacts        (Rel.) Home Phone Work Phone Mobile Phone    KASH GUZMAN (Spouse) -- -- [phone]                "

## 2025-05-14 LAB
ANION GAP SERPL CALCULATED.3IONS-SCNC: 8.1 MMOL/L (ref 5–15)
BASOPHILS # BLD AUTO: 0 10*3/MM3 (ref 0–0.2)
BASOPHILS NFR BLD AUTO: 0 % (ref 0–1.5)
BUN SERPL-MCNC: 11 MG/DL (ref 8–23)
BUN/CREAT SERPL: 13.3 (ref 7–25)
CALCIUM SPEC-SCNC: 9 MG/DL (ref 8.6–10.5)
CHLORIDE SERPL-SCNC: 103 MMOL/L (ref 98–107)
CO2 SERPL-SCNC: 23.9 MMOL/L (ref 22–29)
CREAT SERPL-MCNC: 0.83 MG/DL (ref 0.57–1)
CYTO UR: NORMAL
DEPRECATED RDW RBC AUTO: 38.2 FL (ref 37–54)
EGFRCR SERPLBLD CKD-EPI 2021: 76.4 ML/MIN/1.73
EOSINOPHIL # BLD AUTO: 0 10*3/MM3 (ref 0–0.4)
EOSINOPHIL NFR BLD AUTO: 0 % (ref 0.3–6.2)
ERYTHROCYTE [DISTWIDTH] IN BLOOD BY AUTOMATED COUNT: 12.5 % (ref 12.3–15.4)
GLUCOSE SERPL-MCNC: 120 MG/DL (ref 65–99)
HCT VFR BLD AUTO: 36.3 % (ref 34–46.6)
HGB BLD-MCNC: 12.2 G/DL (ref 12–15.9)
IMM GRANULOCYTES # BLD AUTO: 0.05 10*3/MM3 (ref 0–0.05)
IMM GRANULOCYTES NFR BLD AUTO: 0.5 % (ref 0–0.5)
INR PPP: 1.31 (ref 0.9–1.1)
LAB AP CASE REPORT: NORMAL
LAB AP DIAGNOSIS COMMENT: NORMAL
LAB AP SYNOPTIC CHECKLIST: NORMAL
LYMPHOCYTES # BLD AUTO: 0.72 10*3/MM3 (ref 0.7–3.1)
LYMPHOCYTES NFR BLD AUTO: 7.6 % (ref 19.6–45.3)
MAGNESIUM SERPL-MCNC: 2.6 MG/DL (ref 1.6–2.4)
MCH RBC QN AUTO: 28 PG (ref 26.6–33)
MCHC RBC AUTO-ENTMCNC: 33.6 G/DL (ref 31.5–35.7)
MCV RBC AUTO: 83.3 FL (ref 79–97)
MONOCYTES # BLD AUTO: 0.51 10*3/MM3 (ref 0.1–0.9)
MONOCYTES NFR BLD AUTO: 5.4 % (ref 5–12)
NEUTROPHILS NFR BLD AUTO: 8.22 10*3/MM3 (ref 1.7–7)
NEUTROPHILS NFR BLD AUTO: 86.5 % (ref 42.7–76)
NRBC BLD AUTO-RTO: 0 /100 WBC (ref 0–0.2)
PATH REPORT.FINAL DX SPEC: NORMAL
PATH REPORT.GROSS SPEC: NORMAL
PLATELET # BLD AUTO: 378 10*3/MM3 (ref 140–450)
PMV BLD AUTO: 9 FL (ref 6–12)
POTASSIUM SERPL-SCNC: 4.3 MMOL/L (ref 3.5–5.2)
PROTHROMBIN TIME: 16.2 SECONDS (ref 11.7–14.2)
RBC # BLD AUTO: 4.36 10*6/MM3 (ref 3.77–5.28)
SODIUM SERPL-SCNC: 135 MMOL/L (ref 136–145)
WBC NRBC COR # BLD AUTO: 9.5 10*3/MM3 (ref 3.4–10.8)

## 2025-05-14 PROCEDURE — 97530 THERAPEUTIC ACTIVITIES: CPT

## 2025-05-14 PROCEDURE — 25010000002 ENOXAPARIN PER 10 MG: Performed by: COLON & RECTAL SURGERY

## 2025-05-14 PROCEDURE — 99024 POSTOP FOLLOW-UP VISIT: CPT | Performed by: PHYSICIAN ASSISTANT

## 2025-05-14 PROCEDURE — 97162 PT EVAL MOD COMPLEX 30 MIN: CPT

## 2025-05-14 PROCEDURE — 25010000002 HYDROMORPHONE PER 4 MG: Performed by: COLON & RECTAL SURGERY

## 2025-05-14 PROCEDURE — 80048 BASIC METABOLIC PNL TOTAL CA: CPT | Performed by: COLON & RECTAL SURGERY

## 2025-05-14 PROCEDURE — 83735 ASSAY OF MAGNESIUM: CPT | Performed by: COLON & RECTAL SURGERY

## 2025-05-14 PROCEDURE — 25010000002 ACETAMINOPHEN 10 MG/ML SOLUTION: Performed by: COLON & RECTAL SURGERY

## 2025-05-14 PROCEDURE — 85025 COMPLETE CBC W/AUTO DIFF WBC: CPT | Performed by: COLON & RECTAL SURGERY

## 2025-05-14 PROCEDURE — 25010000002 KETOROLAC TROMETHAMINE PER 15 MG: Performed by: COLON & RECTAL SURGERY

## 2025-05-14 PROCEDURE — 85610 PROTHROMBIN TIME: CPT | Performed by: COLON & RECTAL SURGERY

## 2025-05-14 RX ORDER — CELECOXIB 200 MG/1
200 CAPSULE ORAL EVERY 12 HOURS SCHEDULED
Status: DISCONTINUED | OUTPATIENT
Start: 2025-05-14 | End: 2025-05-16 | Stop reason: HOSPADM

## 2025-05-14 RX ORDER — ACETAMINOPHEN 500 MG
1000 TABLET ORAL EVERY 6 HOURS
Status: DISCONTINUED | OUTPATIENT
Start: 2025-05-14 | End: 2025-05-16 | Stop reason: HOSPADM

## 2025-05-14 RX ADMIN — PANTOPRAZOLE SODIUM 40 MG: 40 TABLET, DELAYED RELEASE ORAL at 05:42

## 2025-05-14 RX ADMIN — ACETAMINOPHEN 1000 MG: 500 TABLET ORAL at 20:37

## 2025-05-14 RX ADMIN — ENOXAPARIN SODIUM 40 MG: 100 INJECTION SUBCUTANEOUS at 09:12

## 2025-05-14 RX ADMIN — KETOROLAC TROMETHAMINE 30 MG: 30 INJECTION, SOLUTION INTRAMUSCULAR at 11:43

## 2025-05-14 RX ADMIN — LATANOPROST 1 DROP: 50 SOLUTION/ DROPS OPHTHALMIC at 22:22

## 2025-05-14 RX ADMIN — ALVIMOPAN 12 MG: 12 CAPSULE ORAL at 20:37

## 2025-05-14 RX ADMIN — ALVIMOPAN 12 MG: 12 CAPSULE ORAL at 09:12

## 2025-05-14 RX ADMIN — GABAPENTIN 300 MG: 300 CAPSULE ORAL at 09:12

## 2025-05-14 RX ADMIN — HYDROMORPHONE HYDROCHLORIDE 0.25 MG: 1 INJECTION, SOLUTION INTRAMUSCULAR; INTRAVENOUS; SUBCUTANEOUS at 07:00

## 2025-05-14 RX ADMIN — ACETAMINOPHEN 1000 MG: 10 INJECTION INTRAVENOUS at 14:31

## 2025-05-14 RX ADMIN — HYDROMORPHONE HYDROCHLORIDE 0.25 MG: 1 INJECTION, SOLUTION INTRAMUSCULAR; INTRAVENOUS; SUBCUTANEOUS at 22:26

## 2025-05-14 RX ADMIN — KETOROLAC TROMETHAMINE 30 MG: 30 INJECTION, SOLUTION INTRAMUSCULAR at 02:35

## 2025-05-14 RX ADMIN — CELECOXIB 200 MG: 200 CAPSULE ORAL at 20:36

## 2025-05-14 RX ADMIN — GABAPENTIN 300 MG: 300 CAPSULE ORAL at 20:37

## 2025-05-14 RX ADMIN — ACETAMINOPHEN 1000 MG: 10 INJECTION INTRAVENOUS at 09:07

## 2025-05-14 RX ADMIN — LATANOPROST 1 DROP: 50 SOLUTION/ DROPS OPHTHALMIC at 20:38

## 2025-05-14 RX ADMIN — ACETAMINOPHEN 1000 MG: 10 INJECTION INTRAVENOUS at 02:35

## 2025-05-14 NOTE — PROGRESS NOTES
Crs attending   Pod 1  Patient feeling pretty good  Tolerated fulls  Hungry for mashed potatoes  Having ostomy output    Vital signs stable  Abd soft nd   Ostomy pink    Labs reviewed   Pathology reviewed with patient: T3 N0 M0 rectosigmoid adenocarcinoma     A/p status post low anterior resection with diverting loop ileostomy  Recent ischemic colitis admission  Discussed pathology with patient and   Will refer to medical oncology to review if a candidate for adjuvant chemotherapy  Advance diet  Encouraged to ambulate

## 2025-05-14 NOTE — PLAN OF CARE
Goal Outcome Evaluation:  Plan of Care Reviewed With: patient        Progress: improving  Outcome Evaluation: Patient c/o minimal pain, ERAS protocol continues, no PRN dilaudid given so far this shift. Up in chair and ambulated in hallway. WOCN in to see patient and ostomy bag changed. Will start regular diet with dinner. +void after george remove.

## 2025-05-14 NOTE — THERAPY EVALUATION
Patient Name: Jeanine Guzman  : 1955    MRN: [id number]                              Today's Date: 2025       Admit Date: 2025    Visit Dx:     ICD-10-CM ICD-9-CM   1. Cancer of sigmoid  C18.7 153.3     Patient Active Problem List   Diagnosis    Colitis    BRBPR (bright red blood per rectum)    Diarrhea    Rectal mass    Cancer of sigmoid    Colon cancer     Past Medical History:   Diagnosis Date    Allergic     Alergic to Morphine & latex    Anxiety     Arthritis     HANDS    Colon cancer 2025    Colon polyp 2022    Diverticulitis of colon 2010    Diverticulosis     GERD (gastroesophageal reflux disease) 2016    Glaucoma 2018    Headache     History of chest pain 2025    CARDIAC EVAL NEGATIVE    History of Clostridium difficile infection     History of COVID-19     History of kidney stones     History of migraine     History of UTI     Hyperlipidemia 2022    Hypertension     Inflammatory bowel disease 25    Ischemic colitis    Irritable bowel syndrome     PONV (postoperative nausea and vomiting)     Rectal bleeding      Past Surgical History:   Procedure Laterality Date    APPENDECTOMY      COLON RESECTION N/A 2025    Procedure: COLON RESECTION LOW ANTERIOR LAPAROSCOPIC WITH DAVINCI ROBOT AND DIVERTING LOOP ILEOSTOMY;  Surgeon: Marcos Rowe MD;  Location: [facility] MAIN OR;  Service: Robotics - DaVinci;  Laterality: N/A;    COLONOSCOPY  22    COLONOSCOPY N/A 2025    Procedure: COLONOSCOPY TO CECUM WITH BIOPSY AND SPOT INK MARKER; POLYPECTOMY ( COLD SNARE);  Surgeon: Juan Jose Horner MD;  Location: [facility] ENDOSCOPY;  Service: Gastroenterology;  Laterality: N/A;  COLITIS; DIARRHEA  POST:HEMORRHOIDS; DIVERTICULOSIS; SIGMOID MASS; COLITIS; COLON POLYP    EYE SURGERY      Cataracts    FRACTURE SURGERY  2010    Broken wrist    HYSTERECTOMY        General Information       Row Name 25 1205          Physical Therapy Time and  Intention    Document Type evaluation  -     Mode of Treatment individual therapy;physical therapy  -       Row Name 05/14/25 1204          General Information    Patient Profile Reviewed yes  -     Prior Level of Function independent:;ADL's;gait;community mobility;transfer;bed mobility  -     Existing Precautions/Restrictions fall  -     Barriers to Rehab medically complex;previous functional deficit  -       Row Name 05/14/25 1204          Living Environment    Current Living Arrangements home  -     People in Home spouse  -       Row Name 05/14/25 1204          Home Main Entrance    Number of Stairs, Main Entrance two;three  -     Stair Railings, Main Entrance railings safe and in good condition  -       Row Name 05/14/25 1204          Cognition    Orientation Status (Cognition) oriented x 3  -       Row Name 05/14/25 1204          Safety Issues/Impairments Affecting Functional Mobility    Safety Issues Affecting Function (Mobility) safety precaution awareness;safety precautions follow-through/compliance;sequencing abilities;insight into deficits/self-awareness;awareness of need for assistance;at risk behavior observed;problem-solving  -     Impairments Affecting Function (Mobility) balance;endurance/activity tolerance;shortness of breath;strength;pain;postural/trunk control  -     Comment, Safety Issues/Impairments (Mobility) Gait belt and slippers donned  for safety  -               User Key  (r) = Recorded By, (t) = Taken By, (c) = Cosigned By      Initials Name Provider Type     Freddie Walker, PT Physical Therapist                   Mobility       Row Name 05/14/25 1203          Bed Mobility    Bed Mobility scooting/bridging;supine-sit  -     Scooting/Bridging [place] (Bed Mobility) contact guard  -     Supine-Sit [place] (Bed Mobility) contact guard  -     Assistive Device (Bed Mobility) head of bed elevated;bed rails  -       Row Name 05/14/25 1207           Sit-Stand Transfer    Sit-Stand [place] (Transfers) minimum assist (75% patient effort);moderate assist (50% patient effort);1 person assist  -       Row Name 05/14/25 1205          Gait/Stairs (Locomotion)    [place] Level (Gait) minimum assist (75% patient effort);1 person assist  -     Patient was able to Ambulate yes  -     Distance in Feet (Gait) 20  -     Deviations/Abnormal Patterns (Gait) stride length decreased;weight shifting decreased;shira decreased;base of support, wide;gait speed decreased  -     Bilateral Gait Deviations forward flexed posture;weight shift ability decreased;heel strike decreased  -     Comment, (Gait/Stairs) Refused AD  -               User Key  (r) = Recorded By, (t) = Taken By, (c) = Cosigned By      Initials Name Provider Type     Freddie Walker, PT Physical Therapist                   Obj/Interventions       Row Name 05/14/25 1205          Range of Motion Comprehensive    General Range of Motion bilateral upper extremity ROM WFL;bilateral lower extremity ROM WFL  -       Row Name 05/14/25 1205          Strength Comprehensive (MMT)    Comment, General Manual Muscle Testing (MMT) Assessment Grossly 4-/5 BLE  -       Row Name 05/14/25 1205          Motor Skills    Motor Skills functional endurance  -     Functional Endurance Poor  -       Row Name 05/14/25 1205          Balance    Balance Assessment sitting static balance;sitting dynamic balance;standing static balance;standing dynamic balance  -     Static Sitting Balance contact guard  -     Dynamic Sitting Balance contact guard  -     Position, Sitting Balance sitting edge of bed  -     Static Standing Balance contact guard  -     Dynamic Standing Balance contact guard;minimal assist  -     Balance Interventions standing;sit to stand;sitting;static;minimal challenge;dynamic  -               User Key  (r) = Recorded By, (t) = Taken By, (c) = Cosigned By      Initials Name Provider  Type    MH Freddie Walker, PT Physical Therapist                   Goals/Plan       Row Name 05/14/25 1208          Bed Mobility Goal 1 (PT)    Activity/Assistive Device (Bed Mobility Goal 1, PT) bed mobility activities, all  -MH     [place] Level/Cues Needed (Bed Mobility Goal 1, PT) independent  -MH     Time Frame (Bed Mobility Goal 1, PT) short term goal (STG);1 week  -MH     Progress/Outcomes (Bed Mobility Goal 1, PT) new goal  -MH       Row Name 05/14/25 1208          Transfer Goal 1 (PT)    Activity/Assistive Device (Transfer Goal 1, PT) transfers, all  -MH     [place] Level/Cues Needed (Transfer Goal 1, PT) independent  -MH     Time Frame (Transfer Goal 1, PT) short term goal (STG);1 week  -MH     Progress/Outcome (Transfer Goal 1, PT) new goal  -       Row Name 05/14/25 1208          Gait Training Goal 1 (PT)    Activity/Assistive Device (Gait Training Goal 1, PT) gait (walking locomotion)  -MH     [place] Level (Gait Training Goal 1, PT) contact guard required  -MH     Distance (Gait Training Goal 1, PT) 100  -MH     Time Frame (Gait Training Goal 1, PT) short term goal (STG);1 week  -MH     Progress/Outcome (Gait Training Goal 1, PT) new goal  -       Row Name 05/14/25 1208          Stairs Goal 1 (PT)    Activity/Assistive Device (Stairs Goal 1, PT) stairs, all skills  -MH     [place] Level/Cues Needed (Stairs Goal 1, PT) contact guard required  -MH     Number of Stairs (Stairs Goal 1, PT) 3  -MH     Time Frame (Stairs Goal 1, PT) short term goal (STG)  -MH     Progress/Outcome (Stairs Goal 1, PT) new goal  -       Row Name 05/14/25 1208          Therapy Assessment/Plan (PT)    Planned Therapy Interventions (PT) balance training;bed mobility training;gait training;home exercise program;patient/family education;neuromuscular re-education;stretching;transfer training;postural re-education;ROM (range of motion);stair training;strengthening  -MH               User Key  (r) = Recorded  By, (t) = Taken By, (c) = Cosigned By      Initials Name Provider Type     Freddie Walker, PT Physical Therapist                   Clinical Impression       Row Name 05/14/25 5585          Plan of Care Review    Plan of Care Reviewed With patient  -     Progress improving  -     Outcome Evaluation Pt is a 69 y.o. female admitted to [facility] with c/o vomiting, nausea, diarrhea on 5/13/2025. Pt is s/p colon resection and ileostomy and is POD1. Throughout evaluation, patient needs increased time and also requests  helps her with dressing. Prior to hospital admission, pt reports residing in house with spouse and 2-3 LU. Pt has 2 LU to enter the front door and 3 LU when she goes through the garage. Prior to hospital stay, pt was  I with ADLs and utilized no AD at baseline. Pt required CGA for bed mobility, min A/mod A x 1 for STS transfers, and CGA/min A x 1 for ambulation with no AD but HHA at times for 20 ft. Pt required min A x 1 from the EOB and mod A x 1 from the bedside commode when completing STS. Pt is able to ambulate around the room and use the bedside commode and then complete ambulation to the bedside chair. Many instances of instability during walking. Pt refuses use of walker after education of benefits for safety and stability. Pt would benefit from using an AD in future sessions due to instability at times and requiring assistance during ambulation. Pt presents today with below baseline strength, dec endurance, and decreased functional mobility. Pt will benefit from skilled PT to address the previous deficits and improve overall safety with functional mobility. Anticipate pt will D/C to home with  or SNF pending progress.  -       Row Name 05/14/25 1773          Therapy Assessment/Plan (PT)    Patient/Family Therapy Goals Statement (PT) return to PLOF  -     Rehab Potential (PT) good  -     Criteria for Skilled Interventions Met (PT) yes  -     Therapy Frequency (PT) 5 times/wk  -        Row Name 05/14/25 1207          Vital Signs    O2 Delivery Pre Treatment room air  -     O2 Delivery Intra Treatment room air  -MH     O2 Delivery Post Treatment room air  -     Pre Patient Position Supine  -MH     Intra Patient Position Standing  -MH     Post Patient Position Sitting  -       Row Name 05/14/25 1207          Positioning and Restraints    Pre-Treatment Position in bed  -MH     Post Treatment Position chair  -MH     In Chair notified nsg;call light within reach;encouraged to call for assist;exit alarm on;sitting  -               User Key  (r) = Recorded By, (t) = Taken By, (c) = Cosigned By      Initials Name Provider Type    Freddie Car, PT Physical Therapist                   Outcome Measures       Row Name 05/14/25 1208          How much help from another person do you currently need...    Turning from your back to your side while in flat bed without using bedrails? 3  -MH     Moving from lying on back to sitting on the side of a flat bed without bedrails? 3  -MH     Moving to and from a bed to a chair (including a wheelchair)? 3  -MH     Standing up from a chair using your arms (e.g., wheelchair, bedside chair)? 3  -MH     Climbing 3-5 steps with a railing? 2  -MH     To walk in hospital room? 2  -     AM-PAC 6 Clicks Score (PT) 16  -     Highest Level of Mobility Goal Stand (1 or More Minutes)-5  -       Row Name 05/14/25 1208          Functional Assessment    Outcome Measure Options AM-PAC 6 Clicks Basic Mobility (PT)  -               User Key  (r) = Recorded By, (t) = Taken By, (c) = Cosigned By      Initials Name Provider Type    Freddie Car, JORDY Physical Therapist                                 Physical Therapy Education       Title: PT OT SLP Therapies (In Progress)       Topic: Physical Therapy (Done)       Point: Mobility training (Done)       Learning Progress Summary            Patient Acceptance, E, VU,NR by  at 5/14/2025 6278                       Point: Home exercise program (Done)       Learning Progress Summary            Patient Acceptance, E, VU,NR by  at 5/14/2025 1209                      Point: Body mechanics (Done)       Learning Progress Summary            Patient Acceptance, E, VU,NR by  at 5/14/2025 1209                      Point: Precautions (Done)       Learning Progress Summary            Patient Acceptance, E, VU,NR by  at 5/14/2025 1209                                      User Key       Initials Effective Dates Name Provider Type Discipline     09/11/24 -  Freddie Walker PT Physical Therapist PT                  PT Recommendation and Plan  Planned Therapy Interventions (PT): balance training, bed mobility training, gait training, home exercise program, patient/family education, neuromuscular re-education, stretching, transfer training, postural re-education, ROM (range of motion), stair training, strengthening  Progress: improving  Outcome Evaluation: Pt is a 69 y.o. female admitted to [facility] with c/o vomiting, nausea, diarrhea on 5/13/2025. Pt is s/p colon resection and ileostomy and is POD1. Throughout evaluation, patient needs increased time and also requests  helps her with dressing. Prior to hospital admission, pt reports residing in house with spouse and 2-3 LU. Pt has 2 LU to enter the front door and 3 LU when she goes through the garage. Prior to hospital stay, pt was  I with ADLs and utilized no AD at baseline. Pt required CGA for bed mobility, min A/mod A x 1 for STS transfers, and CGA/min A x 1 for ambulation with no AD but HHA at times for 20 ft. Pt required min A x 1 from the EOB and mod A x 1 from the bedside commode when completing STS. Pt is able to ambulate around the room and use the bedside commode and then complete ambulation to the bedside chair. Many instances of instability during walking. Pt refuses use of walker after education of benefits for safety and stability. Pt would benefit from using an AD  in future sessions due to instability at times and requiring assistance during ambulation. Pt presents today with below baseline strength, dec endurance, and decreased functional mobility. Pt will benefit from skilled PT to address the previous deficits and improve overall safety with functional mobility. Anticipate pt will D/C to home with HH or SNF pending progress.     Time Calculation:         PT Charges       Row Name 05/14/25 1203             Time Calculation    Start Time 1041  -      Stop Time 1109  -MH      Time Calculation (min) 28 min  -      PT Received On 05/14/25  -      PT - Next Appointment 05/15/25  -      PT Goal Re-Cert Due Date 05/21/25  -         Time Calculation- PT    Total Timed Code Minutes- PT 15 minute(s)  -MH         Timed Charges    64792 - PT Therapeutic Activity Minutes 15  -MH         Untimed Charges    PT Eval/Re-eval Minutes 13  -MH         Total Minutes    Timed Charges Total Minutes 15  -MH      Untimed Charges Total Minutes 13  -MH       Total Minutes 28  -MH                User Key  (r) = Recorded By, (t) = Taken By, (c) = Cosigned By      Initials Name Provider Type     Freddie Walker, PT Physical Therapist                  Therapy Charges for Today       Code Description Service Date Service Provider Modifiers Qty    63850696487 HC PT THERAPEUTIC ACT EA 15 MIN 5/14/2025 Freddie Walker, PT GP 1    25440988352 HC PT EVAL MOD COMPLEXITY 3 5/14/2025 Freddie Walker, PT GP 1            PT G-Codes  Outcome Measure Options: AM-PAC 6 Clicks Basic Mobility (PT)  AM-PAC 6 Clicks Score (PT): 16  PT Discharge Summary  Anticipated Discharge Disposition (PT): home with home health, home, skilled nursing facility    Freddie Walker, PT  5/14/2025

## 2025-05-14 NOTE — CASE MANAGEMENT/SOCIAL WORK
Continued Stay Note  [facility]     Patient Name: Jeanine Guzman  MRN: [id number]  Today's Date: 5/14/2025    Admit Date: 5/13/2025    Plan: Home with spouse and [facility]. Family will transport at D/C   Discharge Plan       Row Name 05/14/25 1330       Plan    Plan Home with spouse and [facility]. Family will transport at D/C    Patient/Family in Agreement with Plan yes    Plan Comments Met with pt and spouse at bedside. Explained roll of . Face sheet and pharmacy verified. Pt lives with her spouse in a single-story house with 2 steps to enter. Denies any home DME.  Pt is independent with ADLs. Pt has never been to Rehab or used HH. Discussed need for HH at D/C for new ostomy.  Pt requests [facility] see at D/C as they have a certified ostomy nurse. Referral placed in [facility] for [facility]. [facility] has accepted and will see at D/C.  Pt's PCP is Kori Patel DO. Enrolled in Meds to Bed. Pt normally drives. At discharge, family will transport. Explained that CCP would follow to assess for discharge needs.  Pino Mckenna RN-BC                   Discharge Codes    No documentation.                 Expected Discharge Date and Time       Expected Discharge Date Expected Discharge Time    May 16, 2025               Pino Mckenna RN

## 2025-05-14 NOTE — CASE MANAGEMENT/SOCIAL WORK
Discharge Planning Assessment  [facility]     Patient Name: Jeanine Guzman  MRN: [id number]  Today's Date: 5/14/2025    Admit Date: 5/13/2025    Plan: Home with spouse and [facility]. Family will transport at D/C   Discharge Needs Assessment       Row Name 05/14/25 1322       Living Environment    People in Home spouse    Current Living Arrangements home    Potentially Unsafe Housing Conditions none    In the past 12 months has the electric, gas, oil, or water company threatened to shut off services in your home? No    Primary Care Provided by self    Provides Primary Care For no one    Family Caregiver if Needed spouse;other relative(s)    Quality of Family Relationships involved    Able to Return to Prior Arrangements yes       Resource/Environmental Concerns    Resource/Environmental Concerns none    Transportation Concerns none       Transportation Needs    In the past 12 months, has lack of transportation kept you from medical appointments or from getting medications? no    In the past 12 months, has lack of transportation kept you from meetings, work, or from getting things needed for daily living? No       Food Insecurity    Within the past 12 months, you worried that your food would run out before you got the money to buy more. Never true    Within the past 12 months, the food you bought just didn't last and you didn't have money to get more. Never true       Transition Planning    Patient/Family Anticipates Transition to home with family    Patient/Family Anticipated Services at Transition home health care    Transportation Anticipated family or friend will provide       Discharge Needs Assessment    Readmission Within the Last 30 Days no previous admission in last 30 days    Equipment Currently Used at Home none    Concerns to be Addressed care coordination/care conferences;discharge planning    Do you want help finding or keeping work or a job? I do not need or want help    Do you want help with school or  training? For example, starting or completing job training or getting a high school diploma, GED or equivalent No    Anticipated Changes Related to Illness none    Equipment Needed After Discharge colostomy/ostomy supplies    Discharge Facility/Level of Care Needs home with home health    Provided Post Acute Provider List? Refused    Provided Post Acute Provider Quality & Resource List? Refused    Offered/Gave Vendor List no    Patient's Choice of Community Agency(s) Pt requests [facility] see at D/C as they have a certified ostomy nurse.    Current Discharge Risk chronically ill                   Discharge Plan       Row Name 05/14/25 4127       Plan    Plan Home with spouse and [facility]. Family will transport at D/C    Patient/Family in Agreement with Plan yes    Plan Comments Met with pt and spouse at bedside. Explained roll of . Face sheet and pharmacy verified. Pt lives with her spouse in a single-story house with 2 steps to enter. Denies any home DME.  Pt is independent with ADLs. Pt has never been to Rehab or used HH. Discussed need for HH at D/C for new ostomy.  Pt requests [facility] see at D/C as they have a certified ostomy nurse. Referral placed in [facility] for [facility]. [facility] has accepted and will see at D/C.  Pt's PCP is Kori Patel DO. Enrolled in Meds to Bed. Pt normally drives. At discharge, family will transport. Explained that CCP would follow to assess for discharge needs.  Pino Mckenna RN-BC                  Continued Care and Services - Admitted Since 5/13/2025       Home Medical Care Coordination complete.      Service Provider Request Status Services Address Phone Fax Patient Preferred    [facility] [address], [address], [place] [id number] [phone] --                  Expected Discharge Date and Time       Expected Discharge Date Expected Discharge Time    May 16, 2025            Demographic  Summary       Row Name 05/14/25 1320       General Information    Admission Type inpatient    Arrived From PACU/recovery room    Required Notices Provided Important Message from Medicare    Reason for Consult care coordination/care conference;discharge planning    Preferred Language English                   Functional Status       Row Name 05/14/25 1321       Functional Status    Usual Activity Tolerance good    Current Activity Tolerance moderate       Functional Status, IADL    Medications independent    Meal Preparation independent    Housekeeping independent    Laundry independent    Shopping independent    If for any reason you need help with day-to-day activities such as bathing, preparing meals, shopping, managing finances, etc., do you get the help you need? I don't need any help       Mental Status    General Appearance WDL WDL       Mental Status Summary    Recent Changes in Mental Status/Cognitive Functioning no changes       Employment/    Employment Status retired                         Pino Mckenna, RN

## 2025-05-14 NOTE — PLAN OF CARE
Goal Outcome Evaluation:  Plan of Care Reviewed With: patient             Problem: Adult Inpatient Plan of Care  Goal: Plan of Care Review  Outcome: Progressing  Flowsheets (Taken 5/14/2025 8010)  Outcome Evaluation: Vitals stable. tolerating RA. ERAS continued. Ice pack given for abd. Clear liquids. No N/V reported this shift. Plan of care ongoing.  Plan of Care Reviewed With: patient    F/C removed per order.  -5:58am   Pt d/t void.   IVF d/c per order

## 2025-05-14 NOTE — PLAN OF CARE
Goal Outcome Evaluation:  Plan of Care Reviewed With: patient        Progress: improving  Outcome Evaluation: Pt is a 69 y.o. female admitted to [facility] with c/o vomiting, nausea, diarrhea on 5/13/2025. Pt is s/p colon resection and ileostomy and is POD1. Throughout evaluation, patient needs increased time and also requests  helps her with dressing. Prior to hospital admission, pt reports residing in house with spouse and 2-3 LU. Pt has 2 LU to enter the front door and 3 LU when she goes through the garage. Prior to hospital stay, pt was  I with ADLs and utilized no AD at baseline. Pt required CGA for bed mobility, min A/mod A x 1 for STS transfers, and CGA/min A x 1 for ambulation with no AD but HHA at times for 20 ft. Pt required min A x 1 from the EOB and mod A x 1 from the bedside commode when completing STS. Pt is able to ambulate around the room and use the bedside commode and then complete ambulation to the bedside chair. Many instances of instability during walking. Pt refuses use of walker after education of benefits for safety and stability. Pt would benefit from using an AD in future sessions due to instability at times and requiring assistance during ambulation. Pt presents today with below baseline strength, dec endurance, and decreased functional mobility. Pt will benefit from skilled PT to address the previous deficits and improve overall safety with functional mobility. Anticipate pt will D/C to home with HH or SNF pending progress.    Anticipated Discharge Disposition (PT): home with home health, home, skilled nursing facility

## 2025-05-14 NOTE — NURSING NOTE
"   05/14/25 1341   Ileostomy Loop RLQ   Placement date: If unknown, DO NOT use \"Add Comment\" note/Placement time: If unknown, DO NOT use \"Add Comment\" note: 05/13/25 1129   Inserted by: DR RIVAS  Hand Hygiene Completed: Yes  Ileostomy Type: Loop  Location: RLQ   Stomal Appliance 2 piece;Changed  ([place] 2 1/4\")   Stoma Appearance round;swollen;pink;moist;protruding above skin level   Peristomal Assessment Clean;Intact   Accessories/Skin Care cleansed with water;skin barrier ring   Stoma Function flatus;stool   Stool Color green;brown   Stool Consistency liquid   Output (mL) 50 mL     Ostomy education provided to the patient/family: spouse at bedside, patient up in chair, patient reports feeling a little queasy when looking at the stoma and pouch, discussed ostomy care and management, patient is familiar with a variety of terms related to ostomy supplies and ostomy in general, she reports that her sister has a colostomy so she is familiar with some aspects. She has a good support system, neighbor is a RN and another neighbor is an EMT, spouse is helpful and willing to participate in ostomy care. Pouch changed today, provided verbal instruction during process and patient and spouse observed, asking multiple appropriate questions.     [x]Pouch changed   [x]Pt and spouse observed   []Pt participated    []CG participated            [x]Instructed in frequency of pouch change  [x]Pouch emptied and cleaned, demonstrated use of pouch closure    [x]Pt and spouse observed   []Pt participated    []CG participated    [x]Instructed to empty pouch when 1/3 to 1/2 full  []Teaching packet/handouts provided/reviewed  [x]Return to ADL's, showering, clothing  [x]Peristomal skin care, avoiding use of soaps with moisturizers  [x]Diet   [x]Adequate po fluid intake   []S/S of dehydration   []Foods to thicken stool   [x]Foods to avoid to prevent blockage  [x]Supplies at bedside  []Samples ordered    Current Supplies: [place] 2 1/4\" " with barrier ring    WOC Team follow up plan: will f/u daily for education and assessment, will change pouch again on Friday and encourage hands on from patient or spouse

## 2025-05-14 NOTE — CASE MANAGEMENT/SOCIAL WORK
Case Management Readmission Assessment Note    Case Management Readmission Assessment (all recorded)       Readmission Interview       [facility] Name 05/14/25 1307             Readmission Indications    Is the patient and/or family able to complete the readmission assessment questions? Yes      Is this hospitalization related to the prior hospital diagnosis? Yes        Row Name 05/14/25 1307             Recommendation for rehospitalization    Did you speak with your physician prior to coming to the hospital Yes      If yes, what physician did you speak with? Surgery was setup prior to admit        Row Name 05/14/25 1307             Follow-up Appointments    Do you have a PCP? Yes      Did you have an appointment with PCP after your hospitalization? Yes      When was your appointment scheduled? 04/24/25      Did you go to appointment? Yes      Did you have an appointment with a Specialist? Yes      When was your appointment scheduled? 04/15/25      Did you go to appointment? Yes      Are you current with the Pulmonary Clinic? No      Are you current with the CHF Clinic? No        Row Name 05/14/25 1307             Medications    Did you have newly prescribed medications at discharge? Yes      Did you understand the reasons for your medications at discharge and how to take them? Yes      Did you understand the side effects of your medications? Yes      Are you taking all of you prescribed medications? Yes      What pharmacy was used to fill prescription(s)? [facility]      Were medications picked up? Yes        Row Name 05/14/25 1307             Discharge Instructions    Did you understand your discharge instructions? Yes      Did your family/caregiver hear your instructions? No      Were you told to eat a special diet? Yes      Did you adhere to the diet? Yes      Were you given a number of someone to call if you had questions or concerns? Yes        Row Name 05/14/25 1307             Index discharge location/services    Where  did you go upon discharge? Home      Do you have supportive family or friends in the home? Yes        Row Name 05/14/25 1307             Discharge Readiness    On a scale of 1-5 (5 being well prepared), how ready were you for discharge 5      Recommendation based on interview Education on diagnosis/self management        Row Name 05/14/25 1307             Palliative Care/Hospice    Are you current with Palliative Care? No      Are you current with Hospice Care? No        Row Name 05/13/25 1646             Advance Directives (For Healthcare)    Pre-existing AND/MOST/POLST Order No      Advance Directive Status Patient has advance directive, copy requested      Type of Advance Directive Health care directive/Living will      Have you reviewed your Advance Directive and is it valid for this stay? No      Literature Provided on Advance Directives No      Patient Requests Assistance on Advance Directives Patient Declined        Row Name 05/14/25 1307             Readmission Assessment Final Comments    Final Comments Pt was admitted to [facility] 4/9/25 -4/16/25 with Abd pain, colitis; and rectal mass. Pt returns to [facility] on 5/13/25 to have COLON RESECTION LOW ANTERIOR LAPAROSCOPIC WITH DAVINCI ROBOT AND DIVERTING LOOP ILEOSTOMY for removal of rectal mass.

## 2025-05-14 NOTE — PROGRESS NOTES
Progress Note    Pod 1    S: Pt resting in bed. Sat up in the chair for a little bit yesterday. C/O abdominal pain, although states that this has improved after taking a dose of Dilaudid this morning. She is tolerating clear liquids and has ginger Ale and Sprite on her beside table. Does not like the appearance for her stoma and will take some getting use to this. Jeffery catheter removed earlier this morning.     O:  Temp:  [97.4 °F (36.3 °C)-98.6 °F (37 °C)] 98.1 °F (36.7 °C)  Heart Rate:  [52-83] 61  Resp:  [14-18] 16  BP: (120-153)/(50-80) 136/55      Intake/Output Summary (Last 24 hours) at 5/14/2025 0755  Last data filed at 5/14/2025 0549  Gross per 24 hour   Intake 2225 ml   Output 3205 ml   Net -980 ml       Abd: Soft, non-distended  Incision: clean, dry, intact  Ostomy: Stoma PPP with a small amount of bilious drainage in bag      Results from last 7 days   Lab Units 05/14/25  0416   WBC 10*3/mm3 9.50   HEMOGLOBIN g/dL 12.2   HEMATOCRIT % 36.3   PLATELETS 10*3/mm3 378     Results from last 7 days   Lab Units 05/14/25  0416   SODIUM mmol/L 135*   POTASSIUM mmol/L 4.3   CHLORIDE mmol/L 103   CO2 mmol/L 23.9   BUN mg/dL 11   CREATININE mg/dL 0.83   EGFR mL/min/1.73 76.4   GLUCOSE mg/dL 120*   CALCIUM mg/dL 9.0       Results from last 7 days   Lab Units 05/14/25  0416   MAGNESIUM mg/dL 2.6*         A/P: 69 y.o. female with s/p COLON RESECTION LOW ANTERIOR LAPAROSCOPIC WITH DAVINCI ROBOT AND DIVERTING LOOP ILEOSTOMY    - Pathology pending  - VSS  - Full liquid diet. No carbonated beverages. Advance as tolerated.   - Continue Entereg  - Jeffery catheter removed this morning. Awaiting bladder function.   - Encouraged frequent ambulation and sitting up in the chair most of the day

## 2025-05-15 LAB
GEN 5 1HR TROPONIN T REFLEX: 11 NG/L
GLUCOSE BLDC GLUCOMTR-MCNC: 98 MG/DL (ref 70–130)
QT INTERVAL: 408 MS
QT INTERVAL: 439 MS
QTC INTERVAL: 371 MS
QTC INTERVAL: 389 MS
TROPONIN T % DELTA: -27
TROPONIN T NUMERIC DELTA: -4 NG/L
TROPONIN T SERPL HS-MCNC: 15 NG/L

## 2025-05-15 PROCEDURE — 99024 POSTOP FOLLOW-UP VISIT: CPT | Performed by: PHYSICIAN ASSISTANT

## 2025-05-15 PROCEDURE — 93010 ELECTROCARDIOGRAM REPORT: CPT | Performed by: INTERNAL MEDICINE

## 2025-05-15 PROCEDURE — 25010000002 ENOXAPARIN PER 10 MG: Performed by: COLON & RECTAL SURGERY

## 2025-05-15 PROCEDURE — 93005 ELECTROCARDIOGRAM TRACING: CPT | Performed by: PHYSICIAN ASSISTANT

## 2025-05-15 PROCEDURE — 97530 THERAPEUTIC ACTIVITIES: CPT

## 2025-05-15 PROCEDURE — 93005 ELECTROCARDIOGRAM TRACING: CPT | Performed by: INTERNAL MEDICINE

## 2025-05-15 PROCEDURE — 84484 ASSAY OF TROPONIN QUANT: CPT | Performed by: PHYSICIAN ASSISTANT

## 2025-05-15 PROCEDURE — 82948 REAGENT STRIP/BLOOD GLUCOSE: CPT

## 2025-05-15 RX ADMIN — GABAPENTIN 300 MG: 300 CAPSULE ORAL at 20:17

## 2025-05-15 RX ADMIN — ENOXAPARIN SODIUM 40 MG: 100 INJECTION SUBCUTANEOUS at 08:22

## 2025-05-15 RX ADMIN — ACETAMINOPHEN 1000 MG: 500 TABLET ORAL at 03:25

## 2025-05-15 RX ADMIN — PANTOPRAZOLE SODIUM 40 MG: 40 TABLET, DELAYED RELEASE ORAL at 06:45

## 2025-05-15 RX ADMIN — CELECOXIB 200 MG: 200 CAPSULE ORAL at 20:17

## 2025-05-15 RX ADMIN — GABAPENTIN 300 MG: 300 CAPSULE ORAL at 08:22

## 2025-05-15 RX ADMIN — ACETAMINOPHEN 1000 MG: 500 TABLET ORAL at 15:49

## 2025-05-15 RX ADMIN — ACETAMINOPHEN 1000 MG: 500 TABLET ORAL at 10:01

## 2025-05-15 RX ADMIN — LATANOPROST 1 DROP: 50 SOLUTION/ DROPS OPHTHALMIC at 20:17

## 2025-05-15 RX ADMIN — CELECOXIB 200 MG: 200 CAPSULE ORAL at 08:22

## 2025-05-15 RX ADMIN — ACETAMINOPHEN 1000 MG: 500 TABLET ORAL at 20:17

## 2025-05-15 NOTE — PLAN OF CARE
Goal Outcome Evaluation:  Plan of Care Reviewed With: patient        Progress: improving  Outcome Evaluation: Pt supine in bed upon entry for PT treatment session on this date and is agreeable to session. Pt reports that she feels much better today than she did yesterday and she has been up and walking some today. Pt is able to complete all bed mobility on this date with [place] and is able to complete STS with independence as well. Pt is able to ambulate 500' today with SBA and no AD. No overt LOB or instabilities are noted during ambulation and patient demonstrates good overall gait mechanics. Pt is able to complete 10 x STS from the edge of the chair without any increase in symptoms or dizziness/nausea. No further skilled PT services are warranted at this time. Educated patient on continuing to walk around her room every waking hour and to continue walking around the nurses station with staff as able. PT will sign off for now. Anticipate d/c to home when leaving the hospital.    Anticipated Discharge Disposition (PT): home, home with assist

## 2025-05-15 NOTE — THERAPY DISCHARGE NOTE
Patient Name: Jeanine Guzman  : 1955    MRN: [id number]                              Today's Date: 5/15/2025       Admit Date: 2025    Visit Dx:     ICD-10-CM ICD-9-CM   1. Cancer of sigmoid  C18.7 153.3     Patient Active Problem List   Diagnosis    Colitis    BRBPR (bright red blood per rectum)    Diarrhea    Rectal mass    Cancer of sigmoid    Colon cancer     Past Medical History:   Diagnosis Date    Allergic     Alergic to Morphine & latex    Anxiety     Arthritis     HANDS    Colon cancer 2025    Colon polyp 2022    Diverticulitis of colon 2010    Diverticulosis     GERD (gastroesophageal reflux disease) 2016    Glaucoma 2018    Headache     History of chest pain 2025    CARDIAC EVAL NEGATIVE    History of Clostridium difficile infection     History of COVID-19     History of kidney stones     History of migraine     History of UTI     Hyperlipidemia 2022    Hypertension     Inflammatory bowel disease 25    Ischemic colitis    Irritable bowel syndrome     PONV (postoperative nausea and vomiting)     Rectal bleeding      Past Surgical History:   Procedure Laterality Date    APPENDECTOMY      COLON RESECTION N/A 2025    Procedure: COLON RESECTION LOW ANTERIOR LAPAROSCOPIC WITH DAVINCI ROBOT AND DIVERTING LOOP ILEOSTOMY;  Surgeon: Marcos Rowe MD;  Location: [facility] MAIN OR;  Service: Robotics - DaVinci;  Laterality: N/A;    COLONOSCOPY  22    COLONOSCOPY N/A 2025    Procedure: COLONOSCOPY TO CECUM WITH BIOPSY AND SPOT INK MARKER; POLYPECTOMY ( COLD SNARE);  Surgeon: Juan Jose Horner MD;  Location: [facility] ENDOSCOPY;  Service: Gastroenterology;  Laterality: N/A;  COLITIS; DIARRHEA  POST:HEMORRHOIDS; DIVERTICULOSIS; SIGMOID MASS; COLITIS; COLON POLYP    EYE SURGERY      Cataracts    FRACTURE SURGERY  2010    Broken wrist    HYSTERECTOMY        General Information       Row Name 05/15/25 3348          Physical Therapy Time and  Intention    Document Type discharge treatment  -     Mode of Treatment individual therapy;physical therapy  -       Row Name 05/15/25 1628          General Information    Patient Profile Reviewed yes  -     Prior Level of Function independent:;ADL's  -     Existing Precautions/Restrictions no known precautions/restrictions  -     Barriers to Rehab none identified  -       Row Name 05/15/25 1628          Cognition    Orientation Status (Cognition) oriented x 4  -       Row Name 05/15/25 1628          Safety Issues/Impairments Affecting Functional Mobility    Comment, Safety Issues/Impairments (Mobility) Non skid socks for safety  -               User Key  (r) = Recorded By, (t) = Taken By, (c) = Cosigned By      Initials Name Provider Type     Freddie Walker, PT Physical Therapist                   Mobility       Row Name 05/15/25 1628          Bed Mobility    Bed Mobility scooting/bridging;supine-sit;sit-supine  -     Scooting/Bridging [place] (Bed Mobility) independent  -     Supine-Sit [place] (Bed Mobility) independent  -     Sit-Supine [place] (Bed Mobility) independent  -       Row Name 05/15/25 1628          Sit-Stand Transfer    Sit-Stand [place] (Transfers) independent  -       Row Name 05/15/25 1628          Gait/Stairs (Locomotion)    [place] Level (Gait) standby assist  -     Patient was able to Ambulate yes  -     Distance in Feet (Gait) 500  -     Bilateral Gait Deviations forward flexed posture  -               User Key  (r) = Recorded By, (t) = Taken By, (c) = Cosigned By      Initials Name Provider Type     Freddie Walker, PT Physical Therapist                   Obj/Interventions       Row Name 05/15/25 1629          Motor Skills    Therapeutic Exercise other (see comments)  10 x STS  -       Row Name 05/15/25 1629          Balance    Balance Assessment sitting static balance;sitting dynamic balance;standing static balance;standing  dynamic balance  -MH     Static Sitting Balance independent  -MH     Dynamic Sitting Balance independent  -MH     Static Standing Balance independent  -MH     Dynamic Standing Balance standby assist  -MH     Balance Interventions sitting;standing;sit to stand;static;dynamic;minimal challenge  -MH               User Key  (r) = Recorded By, (t) = Taken By, (c) = Cosigned By      Initials Name Provider Type     Freddie Walker, PT Physical Therapist                   Goals/Plan       Row Name 05/15/25 1627          Bed Mobility Goal 1 (PT)    Activity/Assistive Device (Bed Mobility Goal 1, PT) bed mobility activities, all  -MH     [place] Level/Cues Needed (Bed Mobility Goal 1, PT) independent  -MH     Time Frame (Bed Mobility Goal 1, PT) short term goal (STG);1 week  -MH     Progress/Outcomes (Bed Mobility Goal 1, PT) goal met  -       Row Name 05/15/25 1627          Transfer Goal 1 (PT)    Activity/Assistive Device (Transfer Goal 1, PT) transfers, all  -MH     [place] Level/Cues Needed (Transfer Goal 1, PT) independent  -MH     Time Frame (Transfer Goal 1, PT) short term goal (STG);1 week  -MH     Progress/Outcome (Transfer Goal 1, PT) goal met  -[facility] Name 05/15/25 1627          Gait Training Goal 1 (PT)    Activity/Assistive Device (Gait Training Goal 1, PT) gait (walking locomotion)  -MH     [place] Level (Gait Training Goal 1, PT) contact guard required  -MH     Distance (Gait Training Goal 1, PT) 100  -MH     Time Frame (Gait Training Goal 1, PT) short term goal (STG);1 week  -MH     Progress/Outcome (Gait Training Goal 1, PT) goal met  -       Row Name 05/15/25 1627          Stairs Goal 1 (PT)    Activity/Assistive Device (Stairs Goal 1, PT) stairs, all skills  -MH     [place] Level/Cues Needed (Stairs Goal 1, PT) contact guard required  -MH     Number of Stairs (Stairs Goal 1, PT) 3  -MH     Time Frame (Stairs Goal 1, PT) short term goal (STG)  -MH     Progress/Outcome (Stairs  Goal 1, PT) good progress toward goal  -               User Key  (r) = Recorded By, (t) = Taken By, (c) = Cosigned By      Initials Name Provider Type     Freddie Walker, PT Physical Therapist                   Clinical Impression       Row Name 05/15/25 1623          Pain    Pretreatment Pain Rating 2/10  -     Posttreatment Pain Rating 2/10  -     Pain Location abdomen  -     Pain Side/Orientation generalized  -       Row Name 05/15/25 1622          Plan of Care Review    Plan of Care Reviewed With patient  -     Progress improving  -     Outcome Evaluation Pt supine in bed upon entry for PT treatment session on this date and is agreeable to session. Pt reports that she feels much better today than she did yesterday and she has been up and walking some today. Pt is able to complete all bed mobility on this date with [place] and is able to complete STS with independence as well. Pt is able to ambulate 500' today with SBA and no AD. No overt LOB or instabilities are noted during ambulation and patient demonstrates good overall gait mechanics. Pt is able to complete 10 x STS from the edge of the chair without any increase in symptoms or dizziness/nausea. No further skilled PT services are warranted at this time. Educated patient on continuing to walk around her room every waking hour and to continue walking around the nurses station with staff as able. PT will sign off for now. Anticipate d/c to home when leaving the hospital.  -       Row Name 05/15/25 1620          Therapy Assessment/Plan (PT)    Patient/Family Therapy Goals Statement (PT) Go home  -     Criteria for Skilled Interventions Met (PT) no;no problems identified which require skilled intervention  -       Row Name 05/15/25 1627          Vital Signs    O2 Delivery Pre Treatment room air  -     O2 Delivery Intra Treatment room air  -     O2 Delivery Post Treatment room air  -     Pre Patient Position Supine  -     Intra  Patient Position Standing  -     Post Patient Position Supine  -       Row Name 05/15/25 1629          Positioning and Restraints    Pre-Treatment Position in bed  -MH     Post Treatment Position bed  -MH     In Bed notified nsg;call light within reach;encouraged to call for assist;katie  -               User Key  (r) = Recorded By, (t) = Taken By, (c) = Cosigned By      Initials Name Provider Type    Freddie Car, JORDY Physical Therapist                   Outcome Measures       Row Name 05/15/25 1630          How much help from another person do you currently need...    Turning from your back to your side while in flat bed without using bedrails? 4  -MH     Moving from lying on back to sitting on the side of a flat bed without bedrails? 4  -MH     Moving to and from a bed to a chair (including a wheelchair)? 4  -MH     Standing up from a chair using your arms (e.g., wheelchair, bedside chair)? 4  -MH     Climbing 3-5 steps with a railing? 4  -MH     To walk in hospital room? 4  -     AM-PAC 6 Clicks Score (PT) 24  -     Highest Level of Mobility Goal Walk 250 Feet or More - 8  -       Row Name 05/15/25 1630          Functional Assessment    Outcome Measure Options AM-PAC 6 Clicks Basic Mobility (PT)  -               User Key  (r) = Recorded By, (t) = Taken By, (c) = Cosigned By      Initials Name Provider Type    Freddie Car, JORDY Physical Therapist                  Physical Therapy Education       Title: PT OT SLP Therapies (In Progress)       Topic: Physical Therapy (Done)       Point: Mobility training (Done)       Learning Progress Summary            Patient Acceptance, E, VU,NR by  at 5/15/2025 1630    Acceptance, E, VU,NR by  at 5/14/2025 1209                      Point: Home exercise program (Done)       Learning Progress Summary            Patient Acceptance, E, VU,NR by  at 5/15/2025 1630    Acceptance, E, VU,NR by  at 5/14/2025 1209                      Point: Body mechanics  (Done)       Learning Progress Summary            Patient Acceptance, E, VU,NR by  at 5/15/2025 1630    Acceptance, E, VU,NR by  at 5/14/2025 1209                      Point: Precautions (Done)       Learning Progress Summary            Patient Acceptance, E, VU,NR by  at 5/15/2025 1630    Acceptance, E, VU,NR by  at 5/14/2025 1209                                      User Key       Initials Effective Dates Name Provider Type Discipline     09/11/24 -  Freddie Walker, JORDY Physical Therapist PT                  PT Recommendation and Plan  Planned Therapy Interventions (PT): balance training, bed mobility training, gait training, home exercise program, patient/family education, neuromuscular re-education, stretching, transfer training, postural re-education, ROM (range of motion), stair training, strengthening  Progress: improving  Outcome Evaluation: Pt supine in bed upon entry for PT treatment session on this date and is agreeable to session. Pt reports that she feels much better today than she did yesterday and she has been up and walking some today. Pt is able to complete all bed mobility on this date with [place] and is able to complete STS with independence as well. Pt is able to ambulate 500' today with SBA and no AD. No overt LOB or instabilities are noted during ambulation and patient demonstrates good overall gait mechanics. Pt is able to complete 10 x STS from the edge of the chair without any increase in symptoms or dizziness/nausea. No further skilled PT services are warranted at this time. Educated patient on continuing to walk around her room every waking hour and to continue walking around the nurses station with staff as able. PT will sign off for now. Anticipate d/c to home when leaving the hospital.     Time Calculation:         PT Charges       Row Name 05/15/25 1626             Time Calculation    Start Time 1446  -      Stop Time 1459  -      Time Calculation (min) 13 min  -       PT Received On 05/15/25  -         Time Calculation- PT    Total Timed Code Minutes- PT 13 minute(s)  -MH         Timed Charges    87395 - PT Therapeutic Activity Minutes 13  -MH         Total Minutes    Timed Charges Total Minutes 13  -MH       Total Minutes 13  -MH                User Key  (r) = Recorded By, (t) = Taken By, (c) = Cosigned By      Initials Name Provider Type     Freddie Walker, PT Physical Therapist                  Therapy Charges for Today       Code Description Service Date Service Provider Modifiers Qty    68367100058 HC PT THERAPEUTIC ACT EA 15 MIN 5/14/2025 Freddie Walker, PT GP 1    93625086705 HC PT EVAL MOD COMPLEXITY 3 5/14/2025 Freddie Walker, PT GP 1    37272654340 HC PT THERAPEUTIC ACT EA 15 MIN 5/15/2025 Freddie Walker, PT GP 1            PT G-Codes  Outcome Measure Options: AM-PAC 6 Clicks Basic Mobility (PT)  AM-PAC 6 Clicks Score (PT): 24    PT Discharge Summary  Anticipated Discharge Disposition (PT): home, home with assist    Freddie Walker, PT  5/15/2025

## 2025-05-15 NOTE — PROGRESS NOTES
CRS attending  Patient eating without difficulty  Having good ostomy output  Feels insecure with the ostomy    Afebrile vital signs are stable  Ostomy pink and productive    Getting ostomy teaching tomorrow  Likely DC in the afternoon    1655 Saw that EKG was read and shows mild ST elevation -will ask cardiology to review

## 2025-05-15 NOTE — PLAN OF CARE
Goal Outcome Evaluation:           Progress: improving  Outcome Evaluation: VSS, colostomy draining liquid brown stool, pt up to BR to void. 1 prn dose of dilaudid given at bedtime, otherwise following ERAS. No questions, plan of care ongoing.

## 2025-05-15 NOTE — PLAN OF CARE
Problem: Adult Inpatient Plan of Care  Goal: Plan of Care Review  Outcome: Progressing  Flowsheets (Taken 5/15/2025 1820)  Progress: improving  Outcome Evaluation: Pt vital stable. Pt positive for ambulation and up and chair. Tolerating diet. Right neck and shoulder pain this am resolved quickly. Cardiology consulted. No pain the rest of the next of the day. Pt safety maintained  Plan of Care Reviewed With: patient

## 2025-05-15 NOTE — PROGRESS NOTES
Progress Note    Pod 2    S: Pt resting in bed. States that she started experiencing right-sided jaw pain, back pain, and left-sided CP approximately 10 minutes prior to my arrival. Had a similar episode during her admission last month. Cardiac workup including EKG, high-sensitivity troponin x2, and stress test all WNL. Echo also performed which showed persevered LVEF with no regional wall motion abnormality or significant valve disease.     Pt tolerating PO intake and having ostomy output. Has voided spontaneously since george catheter removal yesterday.     O:  Temp:  [97.5 °F (36.4 °C)-98.4 °F (36.9 °C)] 97.5 °F (36.4 °C)  Heart Rate:  [52-59] 52  Resp:  [16-18] 18  BP: (134-162)/(55-59) 162/59      Intake/Output Summary (Last 24 hours) at 5/15/2025 0757  Last data filed at 5/15/2025 0005  Gross per 24 hour   Intake 480 ml   Output 370 ml   Net 110 ml       Abd: Soft, non-distended  Incision: clean, dry, intact  Ostomy: PPP with applesauce consistency stool in bag.       A/P: 69 y.o. female s/p COLON RESECTION LOW ANTERIOR LAPAROSCOPIC WITH DAVINCI ROBOT AND DIVERTING LOOP ILEOSTOMY    - Pathology consistent with pT3N0 disease. Pt to follow-up with medical oncology to evaluate need for adjuvant chemotherapy.   - Pt c/o pain in her jaw, back, and chest. Had a similar episode during her admission last month. Cardiac workup at that time was negative. Will check STAT EKG this morning.   - Regular diet  - Anticipate D/C home this afternoon or tomorrow depending on Pt progress.

## 2025-05-15 NOTE — NURSING NOTE
"   05/15/25 1536   Ileostomy Loop RLQ   Placement date: If unknown, DO NOT use \"Add Comment\" note/Placement time: If unknown, DO NOT use \"Add Comment\" note: 05/13/25 1129   Inserted by: DR RIVAS  Hand Hygiene Completed: Yes  Ileostomy Type: Loop  Location: RLQ   Stomal Appliance 2 piece;Intact   Stoma Function flatus;stool   Stool Color brown   Stool Consistency liquid     Wound/ostomy - patient awake, on the phone sitting in bed, reports is feeling better than yesterday, tolerating food but not over doing it on portions. Producing brown ostomy output, pouch has been emptied by staff and patient observes, she sits on EOB for emptying, she feels comfortable with being able to do the task but she is just still queasy when looking at the stoma. She feels confident that her spouse will be able to empty as he does not have a weak stomach. Discussed that they need to be able to empty before leaving because they will need to do this themselves, home health will be available to teach with changing the pouch bit not there every time pouch needs to be emptied, she understands this, \"with my  and I working together we will be able to take care of it\".     Will see patient again tomorrow when spouse is present, will change pouch and would like for them to do some hands on. Spoke to nurse Christianson and asked her to message [employer] nurse tomorrow when spouse is there so we can coordinate time.   "

## 2025-05-16 ENCOUNTER — READMISSION MANAGEMENT (OUTPATIENT)
Dept: CALL CENTER | Facility: HOSPITAL | Age: 70
End: 2025-05-16
Payer: MEDICARE

## 2025-05-16 VITALS
HEIGHT: 64 IN | HEART RATE: 62 BPM | RESPIRATION RATE: 16 BRPM | WEIGHT: 157.63 LBS | OXYGEN SATURATION: 99 % | DIASTOLIC BLOOD PRESSURE: 65 MMHG | TEMPERATURE: 98.4 F | BODY MASS INDEX: 26.91 KG/M2 | SYSTOLIC BLOOD PRESSURE: 142 MMHG

## 2025-05-16 PROCEDURE — 25010000002 ENOXAPARIN PER 10 MG: Performed by: COLON & RECTAL SURGERY

## 2025-05-16 PROCEDURE — 99222 1ST HOSP IP/OBS MODERATE 55: CPT | Performed by: NURSE PRACTITIONER

## 2025-05-16 PROCEDURE — 99024 POSTOP FOLLOW-UP VISIT: CPT | Performed by: PHYSICIAN ASSISTANT

## 2025-05-16 RX ORDER — GABAPENTIN 300 MG/1
300 CAPSULE ORAL EVERY 12 HOURS SCHEDULED
Qty: 20 CAPSULE | Refills: 0 | Status: SHIPPED | OUTPATIENT
Start: 2025-05-16 | End: 2025-05-26

## 2025-05-16 RX ORDER — ACETAMINOPHEN 500 MG
1000 TABLET ORAL EVERY 6 HOURS
Start: 2025-05-16

## 2025-05-16 RX ADMIN — ACETAMINOPHEN 1000 MG: 500 TABLET ORAL at 02:55

## 2025-05-16 RX ADMIN — CELECOXIB 200 MG: 200 CAPSULE ORAL at 08:07

## 2025-05-16 RX ADMIN — GABAPENTIN 300 MG: 300 CAPSULE ORAL at 08:07

## 2025-05-16 RX ADMIN — ACETAMINOPHEN 1000 MG: 500 TABLET ORAL at 10:20

## 2025-05-16 RX ADMIN — ENOXAPARIN SODIUM 40 MG: 100 INJECTION SUBCUTANEOUS at 08:07

## 2025-05-16 RX ADMIN — PANTOPRAZOLE SODIUM 40 MG: 40 TABLET, DELAYED RELEASE ORAL at 06:35

## 2025-05-16 RX ADMIN — ACETAMINOPHEN 1000 MG: 500 TABLET ORAL at 16:09

## 2025-05-16 NOTE — DISCHARGE SUMMARY
Date of Admission: 5/13/2025  Date of Discharge:  05/16/25      Presenting Problem/History of Present Illness  Cancer of sigmoid [C18.7]  Colon cancer [C18.9]     Discharge Diagnosis:  Active Hospital Problems    Diagnosis  POA    **Cancer of sigmoid [C18.7]  Unknown    Colon cancer [C18.9]  Yes      Resolved Hospital Problems   No resolved problems to display.        Procedures Performed  Procedure(s):  COLON RESECTION LOW ANTERIOR LAPAROSCOPIC WITH DAVINCI ROBOT AND DIVERTING LOOP ILEOSTOMY     Hospital Course  Patient is a 69 y.o. female is S/P robot-assisted laparoscopic LAR with diverting loop ileostomy 05/13/2025 for adenocarcinoma. Final pathology consistent with pT3N0 disease. Hospital recovery was uneventful. Pt was initially started on a clear liquid and gradually advanced to a regular diet. She is tolerating PO intake and having ostomy output. Jeffery catheter was removed POD 1 and she is voiding spontaneously without difficulty. Pain well controlled with ERAS and PO medications. Pt comfortable being D/C home today.     Consults:   Consults       Date and Time Order Name Status Description    5/15/2025  4:55 PM Inpatient Cardiology Consult Completed     5/13/2025  7:02 AM Inpatient Anesthesiology Consult      4/12/2025 11:12 AM Inpatient Cardiology Consult Completed     4/11/2025 11:55 AM Inpatient General Surgery Consult Completed             Discharge Disposition  Home-Health Care [facility]    Discharge Medications     Discharge Medications        New Medications        Instructions Start Date   acetaminophen 500 MG tablet  Commonly known as: TYLENOL   1,000 mg, Oral, Every 6 Hours      gabapentin 300 MG capsule  Commonly known as: NEURONTIN   300 mg, Oral, Every 12 Hours Scheduled             Continue These Medications        Instructions Start Date   amLODIPine 5 MG tablet  Commonly known as: NORVASC   5 mg, Oral, Daily      ASHWAGANDHA PO   300 mg, Daily      CHOLESTOFF PO   1 tablet, Oral, Daily, PT HOLDING  FOR SURGERY       esomeprazole 40 MG capsule  Commonly known as: nexIUM   40 mg, Oral, Every Morning Before Breakfast      hyoscyamine 0.125 MG tablet  Commonly known as: ANASPAZ,LEVSIN   0.125 mg, Oral, Every 6 Hours PRN      lactobacillus acidophilus capsule capsule   1 capsule, Oral, Daily      travoprost (BAK free) 0.004 % solution ophthalmic solution  Commonly known as: TRAVATAN   0.004 drops, Nightly      trimethoprim 100 MG tablet  Commonly known as: TRIMPEX   100 mg, Oral, Daily      vitamin b complex capsule capsule   1 capsule, Oral, Daily, PT HOLDING FOR SURGERY              Stop These Medications      benazepril 40 MG tablet  Commonly known as: LOTENSIN     polycarbophil 625 MG tablet tablet              Activity at Discharge:   Activity Instructions       Driving Restrictions      Type of Restriction: Driving    Driving Restrictions: No Driving Until Next Appointment    Gradually Increase Activity Until at Pre-Hospitalization Level      Lifting Restrictions      Type of Restriction: Lifting    Lifting Restrictions: Lifting Restriction (Indicate Limit)    Weight Limit (Pounds): 15    Length of Lifting Restriction: until office appt            Follow-up Appointments  Future Appointments   Date Time Provider Department Center   6/3/2025 11:30 AM Rosalia Mario PA-C [facility]   6/17/2025 12:20 PM Jeni Cifuentes MD [facility]     Additional Instructions for the Follow-ups that You Need to Schedule       Ambulatory Referral to Home Health   As directed      Face to Face Visit Date: 5/16/2025   Follow-up provider for Plan of Care?: I will be treating the patient on an ongoing basis.  Please send me the Plan of Care for signature.   Follow-up provider: ANTONI RIVAS [[id number]]   Reason/Clinical Findings: ischemic colitis, S/P ileostomy, ileostomy care and education   Describe mobility limitations that make leaving home difficult: Assist of one plus device as needed to safely leave home    Nursing/Therapeutic Services Requested: Skilled Nursing   Skilled nursing orders: Medication education (Educate in new ileostomy, appliance last changed 5/16 with family participating in care.) Ostomy instruction   Frequency: 1 Week 1 (OK for SOC on Mon, 5/19)        Call MD With Problems / Concerns   As directed      Instructions: temp >101. Drainage from wound. vomitting.  Measure daily ostomy output and call office 06/19/2025 with totals.    Order Comments: Instructions: temp >101. Drainage from wound. vomitting. Measure daily ostomy output and call office 06/19/2025 with totals.         Discharge Follow-up with Specified Provider: Jae; 2 Weeks   As directed      To: Jae   Follow Up: 2 Weeks                Discharge Diet:   As tolerated  Activity at Discharge:   As tolerated  Follow-up Appointments  2 wks

## 2025-05-16 NOTE — PROGRESS NOTES
"Progress Note    Pod 3    S: Pt sitting up in bed and just finished breakfast. Tolerating a regular diet, but eating smaller meals. Have ostomy output and named her stoma \"PIB\".    O:  Temp:  [97 °F (36.1 °C)-98.6 °F (37 °C)] 97.7 °F (36.5 °C)  Heart Rate:  [46-59] 46  Resp:  [16-18] 16  BP: (118-147)/(60-69) 147/66      Intake/Output Summary (Last 24 hours) at 5/16/2025 0845  Last data filed at 5/16/2025 0813  Gross per 24 hour   Intake 120 ml   Output 775 ml   Net -655 ml       Abd: Soft, non-distended  Incision: clean, dry  Stoma: PPP with custard consistency stool in bag    A/P: 69 y.o. female s/p COLON RESECTION LOW ANTERIOR LAPAROSCOPIC WITH DAVINCI ROBOT AND DIVERTING LOOP ILEOSTOMY    - Pathology consistent with pT3N0 disease. Pt to follow-up with medical oncology to evaluate need for adjuvant chemotherapy.   - WOCN to change ostomy appliance today and provide additional ostomy education.   - Pt C/O jaw, back, and CP yesterday. She was elevated by cardiology last month for a similar episode. Cardiac work-up was negative at that time. EKG performed yesterday showed mild ST-elevated in the inferior leads. High sensitivity troponins x2 performed, which were 15 and then 11. Symptoms have since resolved and Pt currently asymptomatic. Will ask cardiology to reevaluate Pt today.   - Anticipate D/C home this afternoon depending on cardiology evaluation.   "

## 2025-05-16 NOTE — PROGRESS NOTES
Start PACC Note    Home Health Referral    Evaluated patient and  on Home Care and services available. Patient offered choice of available HHC and agreeable to SN services with [facility].    Isolation Precautions: No active isolations    START PATIENT REGISTRATION INFORMATION  Order Information  Order Signing Physician: Marcos Rowe MD  Service Ordered RN?: Yes  Service Ordered PT?: No  Service Ordered OT?: No  Service Ordered ST?: No  Service Ordered MSW?: No  Service Ordered HHA?: No  Following Physician: Marcos Rowe MD  Following Physician Phone: [phone]  Overseeing Physician: Kori Patel DO  (Required for Residents Only)  Agreeable to Follow ? Yes  Date/Time of Call 05/16/25 14:50 EDT, Spoke with: Verbal auth for home health for ileostomy care given per surgeon.    Care Coordination  Same Day SOC?: No  Primary Care Physician: Kori Patel DO  Primary Care Physician Phone: [phone]  Primary Care Physician Address: [address] [place], [place] [id number]  Visit Instructions: N/A  Service Discharge Location Type: Home  Service Facility Name: N/A  Service Floor Facility: N/A  Service Room No: N/A    Demographics  Patient Last Name: Thomas  Patient First Name: Jeanine  Language/Communication Barrier: none  Service Address: [address] [place] [address] City: [place]  Service State: [place]  Service Zip: [address]  Service Home Phone: [phone]  Other Phone Numbers:   Telephone Information:   Mobile [phone]     Emergency Contact:   Extended Emergency Contact Information  Primary Emergency Contact: KASH PAUL  Mobile Phone: [phone]  Relation: Spouse    Admission Information  Admit Date: 5/13/2025  Patient Status at Discharge: Inpatient  Admitting Diagnosis: Cancer of sigmoid [C18.7]  Colon cancer [C18.9]    Caregiver Information  Caregiver First Name:   Caregiver Last Name:   Caregiver Relationship to Patient:   Caregiver Phone Number:   Caregiver Notes:      [employer]  [facility]: HI TECH - NEW OSTOMY  Order: Educate in new ileostomy care/appliance changes, last appliance change done on 5/16/25.  Date of procedure: 05/13/2025  Physician/Surgeon: Dr Marcos Rowe  Ostomy teaching done?: Yes      END PATIENT REGISTRATION INFORMATION    Start PACC Summary    Additional Comments: Call patient's mobile to set up visits.  Patient is requesting women only for her visits please.    END PACC Summary    Discharge Date: Pending    Referral Source: [facility]    Signed By: Sonia Brooke RN, 5/16/2025, 14:50 EDT     Date/Time: 05/16/25 14:50 EDT    End PACC Note

## 2025-05-16 NOTE — PLAN OF CARE
Goal Outcome Evaluation:           Progress: improving  Outcome Evaluation: VSS afebrile. No complaints of CP nor SOA. Pt needs assistance emptying colostomy, Only discomfort pt has discussed was soreness to surgical sight, controlled with tylenol. Up to BR independently with difficulty. Plan of care ongoing.

## 2025-05-16 NOTE — CONSULTS
Patient Name: Jeanine Guzman  :1955  69 y.o.    Date of Admission: 2025  Date of Consultation:  25  Encounter Provider: FREDDIE Dawn  Place of Service: [facility] CARDIOLOGY  Referring Provider: Marcos Rowe MD  Patient Care Team:  Kori Patel DO as PCP - General (Family Medicine)  Sukhi Land RN as Registered Nurse  Marcos Rowe MD as Consulting Physician (Colon and Rectal Surgery)      Chief complaint: CP     History of Present Illness: Ms. Guzman is a 69 year old woman followed by [facility] in the past for chest pain . She has had several negative stress tests. She was admitted to [facility] in April for abdominal pain and rectal bleeding. She was admitted for colitis but was ultimately diagnosed with sigmoid colon cancer. Following endoscopy she had a rapid response called for mid sternal chest pain radiating to shoulder, back and jaw. Improved with GI cocktail. She had a stress test 25 with no ischemia. Echocardiogram was also unremarkable.     She was admitted  for colon resection and ileostomy. She has been recovering well. Yesterday - she complained of similar discomfort to what she had in April. Pain starting in her neck and jaw then radiated into her chest and shoulders. She has this intermittently at home and usually takes baby aspirin. She does not exercise but is active around her house. She has no exertional symptoms.     She is currently resting in bed without complaints.     Echo 25       Left ventricular systolic function is normal. Left ventricular ejection fraction appears to be 61 - 65%.    Left ventricular wall thickness is consistent with concentric hypertrophy. Sigmoid-shaped ventricular septum is present.    Left ventricular diastolic function is consistent with (grade Ia w/high LAP) impaired relaxation.    Normal right ventricular cavity size and systolic function  noted.    The left atrial cavity is mildly dilated.    Calculated right ventricular systolic pressure from tricuspid regurgitation is 20 mmHg.    There is no evidence of pericardial effusion.     Stress 4/13/25       Myocardial perfusion imaging indicates a normal myocardial perfusion study with no evidence of ischemia. Impressions are consistent with a low risk study.    Left ventricular ejection fraction is hyperdynamic (Calculated EF > 70%).    Findings consistent with a normal ECG stress test.    There is no prior study available for comparison.          Past Medical History:   Diagnosis Date    Allergic     Alergic to Morphine & latex    Anxiety     Arthritis     HANDS    Colon cancer 04/2025    Colon polyp 11/11/2022    Diverticulitis of colon 2010    Diverticulosis 2010    GERD (gastroesophageal reflux disease) 2016    Glaucoma 2018    Headache     History of chest pain 04/2025    CARDIAC EVAL NEGATIVE    History of Clostridium difficile infection     History of COVID-19     History of kidney stones     History of migraine     History of UTI     Hyperlipidemia October 2022    Hypertension     Inflammatory bowel disease 04/11/25    Ischemic colitis    Irritable bowel syndrome 2017    PONV (postoperative nausea and vomiting) 1990    Rectal bleeding 2024       Past Surgical History:   Procedure Laterality Date    APPENDECTOMY  1958    COLON RESECTION N/A 5/13/2025    Procedure: COLON RESECTION LOW ANTERIOR LAPAROSCOPIC WITH DAVINCI ROBOT AND DIVERTING LOOP ILEOSTOMY;  Surgeon: Marcos Rowe MD;  Location: [facility] OR;  Service: Robotics - DaVinci;  Laterality: N/A;    COLONOSCOPY  11/11/22    COLONOSCOPY N/A 04/11/2025    Procedure: COLONOSCOPY TO CECUM WITH BIOPSY AND SPOT INK MARKER; POLYPECTOMY ( COLD SNARE);  Surgeon: Juan Jose Horner MD;  [facility] ENDOSCOPY;  Service: Gastroenterology;  Laterality: N/A;  COLITIS; DIARRHEA  POST:HEMORRHOIDS; DIVERTICULOSIS; SIGMOID MASS; COLITIS; COLON  POLYP    EYE SURGERY  2019    Cataracts    FRACTURE SURGERY  2010    Broken wrist    HYSTERECTOMY           Prior to Admission medications    Medication Sig Start Date End Date Taking? Authorizing Provider   amLODIPine (NORVASC) 5 MG tablet Take 1 tablet by mouth Daily. 4/8/25  Yes Kori Patel DO   ASHWAGANDHA PO Take 300 mg by mouth Daily. PT HOLDING FOR SURGERY   Yes Dexter Dumas MD   benazepril (LOTENSIN) 40 MG tablet Take 1 tablet by mouth Daily.  Patient taking differently: Take 1 tablet by mouth Daily. HOLD 24 HR, HOLD DOS 4/8/25  Yes Kori Patel DO   esomeprazole (nexIUM) 40 MG capsule Take 1 capsule by mouth Every Morning Before Breakfast. 5/1/25  Yes Kori Patel DO   lactobacillus acidophilus (RISAQUAD) capsule capsule Take 1 capsule by mouth Daily. 4/17/25  Yes Freddie Eric MD   polycarbophil 625 MG tablet tablet Take 1 tablet by mouth 2 (Two) Times a Day.  Patient taking differently: Take 1 tablet by mouth 2 (Two) Times a Day As Needed. 4/16/25  Yes Freddie Eric MD   travoprost, BAK free, (TRAVATAN) 0.004 % solution ophthalmic solution Administer 0.004 drops to both eyes Every Night. 10/7/22  Yes Dexter Dumas MD   B Complex Vitamins (vitamin b complex) capsule capsule Take 1 capsule by mouth Daily. PT HOLDING FOR SURGERY    Dexter Dumas MD   hyoscyamine (ANASPAZ,LEVSIN) 0.125 MG tablet Take 1 tablet by mouth Every 6 (Six) Hours As Needed for Diarrhea. 7/10/24   Kori Patel DO   Plant Sterols and Stanols (CHOLESTOFF PO) Take 1 tablet by mouth Daily. PT HOLDING FOR SURGERY    Dexter Dumas MD   trimethoprim (TRIMPEX) 100 MG tablet TAKE 1 TABLET BY MOUTH DAILY.  Patient taking differently: Take 1 tablet by mouth Daily As Needed. ONLY TAKES IF HAS UTI 1/9/24   Kori Patel DO       Allergies   Allergen Reactions    Latex Hives     Serious rash    Statins Myalgia    Morphine Unknown (See Comments) and Other (See  Comments)     shaking    Sulfa Antibiotics Rash       Social History     Socioeconomic History    Marital status:      Spouse name: Bret    Number of children: 0   Tobacco Use    Smoking status: Never     Passive exposure: Never    Smokeless tobacco: Never   Vaping Use    Vaping status: Never Used   Substance and Sexual Activity    Alcohol use: Not Currently     Comment: RARELY    Drug use: Never    Sexual activity: Not Currently     Partners: Male     Comment: Not applicable       Family History   Problem Relation Age of Onset    Heart disease Mother     Cancer Father     Hyperlipidemia Sister     Kidney disease Sister     Malig Hyperthermia Neg Hx        REVIEW OF SYSTEMS:   All systems reviewed.  Pertinent positives identified in HPI.  All other systems are negative.      Objective:     Vitals:    05/15/25 1311 05/15/25 2000 05/15/25 2330 05/16/25 0737   BP: 136/69 128/66 118/60 147/66   BP Location: Left arm Right arm Right arm Right arm   Patient Position: Lying Lying Lying Lying   Pulse: 59   (!) 46   Resp: 18 16 16 16   Temp: 98.6 °F (37 °C) 98.1 °F (36.7 °C) 97 °F (36.1 °C) 97.7 °F (36.5 °C)   TempSrc: Oral Oral Oral Oral   SpO2: 97% 98% 98% 99%   Weight:       Height:         Body mass index is 27.06 kg/m².    Physical Exam:  Constitutional: She is oriented to person, place, and time. She appears well-developed. She does not appear ill.   HENT:   Head: Normocephalic and atraumatic. Head is without contusion.   Right Ear: Hearing normal. No drainage.   Left Ear: Hearing normal. No drainage.   Nose: No nasal deformity. No epistaxis.   Eyes: Lids are normal. Right eye exhibits no exudate. Left eye exhibits no exudate.  Neck: No JVD present.   Cardiovascular: Normal rate, regular rhythm and normal heart sounds.    Pulses:       Posterior tibial pulses are 2+ on the right side, and 2+ on the left side.   Pulmonary/Chest: Effort normal and breath sounds normal.   Abdominal: Soft. Normal appearance and  bowel sounds are normal. There is no tenderness.   Musculoskeletal: Normal range of motion.        Right shoulder: She exhibits no deformity.        Left shoulder: She exhibits no deformity.   Neurological: She is alert and oriented to person, place, and time. She has normal strength.   Skin: Skin is warm, dry and intact. No rash noted.   Psychiatric: She has a normal mood and affect. Her behavior is normal. Thought content normal.   Vitals reviewed      Lab Review:     Results from last 7 days   Lab Units 05/14/25  0416 05/13/25  0305   SODIUM mmol/L 135* 134*   POTASSIUM mmol/L 4.3 4.1   CHLORIDE mmol/L 103 101   CO2 mmol/L 23.9 18.7*   BUN mg/dL 11 18   CREATININE mg/dL 0.83 0.86   CALCIUM mg/dL 9.0 10.0   BILIRUBIN mg/dL  --  1.2   ALK PHOS U/L  --  99   ALT (SGPT) U/L  --  13   AST (SGOT) U/L  --  19   GLUCOSE mg/dL 120* 105*     Results from last 7 days   Lab Units 05/15/25  1152 05/15/25  1020   HSTROP T ng/L 11 15*     Results from last 7 days   Lab Units 05/14/25  0416   WBC 10*3/mm3 9.50   HEMOGLOBIN g/dL 12.2   HEMATOCRIT % 36.3   PLATELETS 10*3/mm3 378     Results from last 7 days   Lab Units 05/14/25  0416   INR  1.31*     Results from last 7 days   Lab Units 05/14/25  0416   MAGNESIUM mg/dL 2.6*                   Current Facility-Administered Medications:     acetaminophen (TYLENOL) tablet 1,000 mg, 1,000 mg, Oral, Q6H, Marcos Rowe MD, 1,000 mg at 05/16/25 0255    celecoxib (CeleBREX) capsule 200 mg, 200 mg, Oral, Q12H, Marcos Rowe MD, 200 mg at 05/16/25 0807    enoxaparin sodium (LOVENOX) syringe 40 mg, 40 mg, Subcutaneous, Daily, Marcos Rowe MD, 40 mg at 05/16/25 0807    gabapentin (NEURONTIN) capsule 300 mg, 300 mg, Oral, Q12H, Marcos Rowe MD, 300 mg at 05/16/25 0807    HYDROmorphone (DILAUDID) injection 0.25 mg, 0.25 mg, Intravenous, Q3H PRN, 0.25 mg at 05/14/25 2226 **AND** naloxone (NARCAN) injection 0.4 mg, 0.4 mg, Intravenous, Q5 Min PRN, Marcos Rowe MD    latanoprost  (XALATAN) 0.005 % ophthalmic solution 1 drop, 1 drop, Both Eyes, Nightly, Marcos Rowe MD, 1 drop at 05/15/25 2017    LORazepam (ATIVAN) tablet 0.5 mg, 0.5 mg, Oral, Q8H PRN, Marcos Rowe MD    nitroglycerin (NITROSTAT) SL tablet 0.4 mg, 0.4 mg, Sublingual, Q5 Min PRN, Marcos Rowe MD    ondansetron (ZOFRAN) injection 4 mg, 4 mg, Intravenous, Q6H PRN, Marcos Rowe MD    pantoprazole (PROTONIX) EC tablet 40 mg, 40 mg, Oral, Q AM, Marcos Rowe MD, 40 mg at 05/16/25 0635    Assessment and Plan:       Sigmoid colon cancer status post resection and diverting loop ileostomy 5/13/25  Jaw/neck/back/chest pain - similar complaints last month with negative work up. EKG unchanged. HS troponin essentially normal.  No indication for additional cardiac testing. Will arrange follow up with Dr. Cifuentes in [place]. Needs risk factor modification and could consider coronary CTA if symptoms persistent.   Hypertension   GERD   HLD - allergy to statins. Consider other options as outpatient.    Ok for discharge.     Justine Lema, FREDDIE  05/16/25  09:32 EDT

## 2025-05-16 NOTE — PROGRESS NOTES
CRS attending  Made good progress  Doing well  A regular diet  Empty her ostomy bag  Abdomen soft  Okay for discharge

## 2025-05-16 NOTE — NURSING NOTE
"   05/16/25 1311   Ileostomy Loop RLQ   Placement date: If unknown, DO NOT use \"Add Comment\" note/Placement time: If unknown, DO NOT use \"Add Comment\" note: 05/13/25 1129   Inserted by: DR RIVAS  Hand Hygiene Completed: Yes  Ileostomy Type: Loop  Location: RLQ   Stomal Appliance 2 piece;Changed  (Melisa 2 1/4\", cut slightly oval)   Stoma Appearance round;moist;red;protruding above skin level  (oval)   Peristomal Assessment Clean;Intact   Accessories/Skin Care skin barrier ring;cleansed with water   Stoma Function stool;flatus   Stool Color brown   Stool Consistency loose   Treatment Bag change;Site care   Output (mL) 50 mL     Ostomy education provided to the patient/family: spouse present, pouch emptied and changed with hands on from patient and spouse and verbal prompts when needed, WOC nurse cut the barrier and placed template in with ostomy supplies, discussed to use template as a guide for next change and cut opening accordingly, stoma becoming more oval so opening will not be cut completely round. They did well and feel comfortable with emptying and if had to change due to leakage could manage but would like more reinforcement from home health nurse. Patient still reports a little queasy when looking at the stoma but it is getting better. She describes herself as having a \"weak stomach\" but her  does not so he can provide assist when needed. Discussed will request samples and patient agreeable to providing information to [employer] and [employer].     [x]Pouch changed   []Pt observed   [x]Pt participated    [x]CG participated            [x]Instructed in frequency of pouch change  [x]Pouch emptied and cleaned, demonstrated use of pouch closure    []Pt observed   [x]Pt participated    [x]CG participated    [x]Instructed to empty pouch when 1/3 to 1/2 full  [x]Teaching packet/handouts provided/reviewed  [x]Return to ADL's, showering, clothing  [x]Peristomal skin care, avoiding use of soaps with " "moisturizers  [x]Diet   [x]Adequate po fluid intake   [x]S/S of dehydration   []Foods to thicken stool   [x]Foods to avoid to prevent blockage  [x]Supplies at bedside  [x]Samples ordered: [employer]isa and [employer]    Current Supplies: Melisa 2 1/4\", barrier ring    WOC Team follow up plan: patient is to d/c home today    "

## 2025-05-16 NOTE — OUTREACH NOTE
Prep Survey      Flowsheet Row Responses   [facility] facility patient discharged from? [place]   Is LACE score < 7 ? No   Eligibility [facility]   Date of Admission 05/13/25   Date of Discharge 05/16/25   Discharge Disposition Home or Self Care   Discharge diagnosis Cancer of sigmoid-Colon resection lap   Does the patient have one of the following disease processes/diagnoses(primary or secondary)? General Surgery   Does the patient have Home health ordered? Yes   What is the Home health agency?  [facility]   Is there a DME ordered? No   Prep survey completed? Yes            JOVITA LOERA - Registered Nurse

## 2025-05-16 NOTE — CASE MANAGEMENT/SOCIAL WORK
Case Management Discharge Note      Final Note: Home with spouse and [facility]. Family will transport at D/C    Provided Post Acute Provider List?: Refused  Provided Post Acute Provider Quality & Resource List?: Refused    Selected Continued Care - Admitted Since 5/13/2025       Destination    No services have been selected for the patient.                Durable Medical Equipment    No services have been selected for the patient.                Dialysis/Infusion    No services have been selected for the patient.                Home Medical Care Coordination complete.      Service Provider Services Address Phone Fax Patient Preferred    [facility] Home Rehabilitation [address], [address], [place] [address] [phone] --              Therapy    No services have been selected for the patient.                Community Resources    No services have been selected for the patient.                Community & DME    No services have been selected for the patient.                    Transportation Services  Private: Car    Final Discharge Disposition Code: 06 - home with home health care

## 2025-05-19 ENCOUNTER — TRANSITIONAL CARE MANAGEMENT TELEPHONE ENCOUNTER (OUTPATIENT)
Dept: CALL CENTER | Facility: HOSPITAL | Age: 70
End: 2025-05-19
Payer: MEDICARE

## 2025-05-19 ENCOUNTER — TELEPHONE (OUTPATIENT)
Dept: SURGERY | Facility: CLINIC | Age: 70
End: 2025-05-19
Payer: MEDICARE

## 2025-05-19 NOTE — TELEPHONE ENCOUNTER
Spoke with pt spouse, reported output.   Saturday- 370 mL  Sunday- 500 mL    200 mL so far this morning as of 9:10am

## 2025-05-19 NOTE — OUTREACH NOTE
Call Center TCM Note      Flowsheet Row Responses   [facility] patient discharged from? [place]   Does the patient have one of the following disease processes/diagnoses(primary or secondary)? General Surgery   TCM attempt successful? Yes   Call start time 0852   Call end time 0859   Discharge diagnosis Cancer of sigmoid-Colon resection lap, DIVERTING LOOP ILEOSTOMY   Is patient permission given to speak with other caregiver? Yes   Person spoke with today (if not patient) and relationship  and patient   Meds reviewed with patient/caregiver? Yes   Does the patient have all medications related to this admission filled (includes all antibiotics, pain medications, etc.) Yes   Is the patient taking all medications as directed (includes completed medication regime)? Yes   Medication comments Benazepril dc'd at discharge. Patient to monitor her blood pressure at home.   Comments PCP DR Patel. Patient declined to schedule PCP appt with call today. She will call the office back after her  nurse visits and she knows the  schedule to get her PCP appt in place.   Does the patient have an appointment with their PCP within 7-14 days of discharge? No   Nursing Interventions Patient declined scheduling/rescheduling appointment at this time, Routed TCM call to PCP office   What is the Home health agency?  [facility]   Has home health visited the patient within 72 hours of discharge? Call prior to 72 hours  [Reports  nurse is planned to come today]   Psychosocial issues? No   Comments Patient to monitor home blood pressure and keep log.   Did the patient receive a copy of their discharge instructions? Yes   Nursing interventions Reviewed instructions with patient   What is the patient's perception of their health status since discharge? Improving   Is the patient/caregiver able to teach back steps to recovery at home? Set small, achievable goals for return to baseline health, Rest and rebuild strength, gradually  increase activity   If the patient is a current smoker, are they able to teach back resources for cessation? Not a smoker   Is the patient/caregiver able to teach back the hierarchy of who to call/visit for symptoms/problems? PCP, Specialist, Home health nurse, Urgent Care, ED, 911 Yes   TCM call completed? Yes   Call end time 0859   Would this patient benefit from a Referral to [facility] Social Work? No   Is the patient interested in additional calls from an ambulatory ? No            CHELA TRIPP - Registered Nurse    5/19/2025, 09:03 EDT

## 2025-05-22 ENCOUNTER — OFFICE VISIT (OUTPATIENT)
Dept: FAMILY MEDICINE CLINIC | Facility: CLINIC | Age: 70
End: 2025-05-22
Payer: MEDICARE

## 2025-05-22 VITALS
OXYGEN SATURATION: 99 % | DIASTOLIC BLOOD PRESSURE: 79 MMHG | HEIGHT: 64 IN | HEART RATE: 82 BPM | SYSTOLIC BLOOD PRESSURE: 130 MMHG | WEIGHT: 142 LBS | BODY MASS INDEX: 24.24 KG/M2

## 2025-05-22 DIAGNOSIS — C18.7 ADENOCARCINOMA OF SIGMOID COLON: Primary | ICD-10-CM

## 2025-05-22 DIAGNOSIS — I10 PRIMARY HYPERTENSION: ICD-10-CM

## 2025-05-22 NOTE — PROGRESS NOTES
"Chief Complaint  Transitional Care Management    Subjective        Jeanine Guzman presents to [facility] PRIMARY [facility]ARE  History of Present Illness  Patient is here today to follow-up from her recent colon resection for adenocarcinoma by Dr. Stacey Rowe.  Cancer is stage II.  After the surgery the patient did end up with a colostomy bag and has been doing well with that for the most part.  She was taken off benazepril 40 mg daily during her hospital stay.  She is still taking amlodipine 5 mg daily.  Blood pressures are good today and she states that they have been good at home as well.      Objective   Vital Signs:  /79   Pulse 82   Ht 162.6 cm (64\")   Wt 64.4 kg (142 lb)   SpO2 99%   BMI 24.37 kg/m²   Estimated body mass index is 24.37 kg/m² as calculated from the following:    Height as of this encounter: 162.6 cm (64\").    Weight as of this encounter: 64.4 kg (142 lb).         Physical Exam  Vitals and nursing note reviewed.   Constitutional:       Appearance: She is well-developed.   HENT:      Head: Normocephalic and atraumatic.      Right Ear: External ear normal.      Left Ear: External ear normal.      Nose: Nose normal.   Eyes:      General: No scleral icterus.     Conjunctiva/sclera: Conjunctivae normal.   Cardiovascular:      Rate and Rhythm: Normal rate and regular rhythm.      Heart sounds: Normal heart sounds.   Pulmonary:      Effort: Pulmonary effort is normal.      Breath sounds: Normal breath sounds.   Musculoskeletal:      Cervical back: Normal range of motion and neck supple.      Right lower leg: No edema.      Left lower leg: No edema.   Lymphadenopathy:      Cervical: No cervical adenopathy.   Skin:     General: Skin is warm and dry.      Findings: No rash.   Neurological:      Mental Status: She is alert and oriented to person, place, and time.   Psychiatric:         Mood and Affect: Mood normal.         Behavior: Behavior normal.         Thought Content: Thought " content normal.         Judgment: Judgment normal.        Result Review :  The following data was reviewed by: Kori Patel DO on 05/22/2025:  Common labs          5/9/2025    14:33 5/13/2025    03:05 5/14/2025    04:16   Common Labs   Glucose 77  105  120    BUN 35  18  11    Creatinine 0.86  0.86  0.83    Sodium 138  134  135    Potassium 4.4  4.1  4.3    Chloride 102  101  103    Calcium 9.7  10.0  9.0    Albumin  4.8     Total Bilirubin  1.2     Alkaline Phosphatase  99     AST (SGOT)  19     ALT (SGPT)  13     WBC 6.97  9.64  9.50    Hemoglobin 13.4  13.5  12.2    Hematocrit 41.3  41.0  36.3    Platelets 415  437  378      TSH          8/13/2024    08:29   TSH   TSH 2.040                Assessment and Plan   Diagnoses and all orders for this visit:    1. Adenocarcinoma of sigmoid colon (Primary)    2. Primary hypertension    Patient is here today to follow-up from her recent hospitalization for a sigmoid resection with colostomy due to adenocarcinoma of the sigmoid colon.  Patient is doing well and healing well.  She is having normal amounts of stool in her bag.  She denies any fevers or chills.  Her blood pressures have been good at home.  Patient was advised to continue on the amlodipine 5 mg daily and follow-up in 4 weeks for recheck.         Follow Up   Return in about 26 days (around 6/17/2025) for HTN.  Patient was given instructions and counseling regarding her condition or for health maintenance advice. Please see specific information pulled into the AVS if appropriate.

## 2025-05-23 ENCOUNTER — TELEPHONE (OUTPATIENT)
Dept: SURGERY | Facility: CLINIC | Age: 70
End: 2025-05-23
Payer: MEDICARE

## 2025-05-23 RX ORDER — ONDANSETRON 4 MG/1
4 TABLET, FILM COATED ORAL EVERY 8 HOURS PRN
Qty: 15 TABLET | Refills: 0 | Status: SHIPPED | OUTPATIENT
Start: 2025-05-23

## 2025-05-23 NOTE — TELEPHONE ENCOUNTER
"Pt called, she has not received an rx for zofran  And there is a message in clarus \"Yes, Dr. Rowe was supposed to call in a prescription to [facility] for Jeanine Guzman and the drugstore says they have not got that yet. Thank you.\"  Dr. Pascal completed call but I do not see where this was sent in through epic.   " No

## 2025-05-27 ENCOUNTER — READMISSION MANAGEMENT (OUTPATIENT)
Dept: CALL CENTER | Facility: HOSPITAL | Age: 70
End: 2025-05-27
Payer: MEDICARE

## 2025-05-27 NOTE — OUTREACH NOTE
General Surgery Week 2 Survey      Flowsheet Row Responses   [facility] patient discharged from? [place]   Does the patient have one of the following disease processes/diagnoses(primary or secondary)? General Surgery   Week 2 attempt successful? No   Unsuccessful attempts Attempt 1   Revoke Eva HANCOCK - Registered Nurse

## 2025-05-28 ENCOUNTER — TELEPHONE (OUTPATIENT)
Dept: SURGERY | Facility: CLINIC | Age: 70
End: 2025-05-28
Payer: MEDICARE

## 2025-05-28 DIAGNOSIS — I10 PRIMARY HYPERTENSION: ICD-10-CM

## 2025-05-28 DIAGNOSIS — C18.7 MALIGNANT NEOPLASM OF SIGMOID COLON: ICD-10-CM

## 2025-05-28 RX ORDER — GABAPENTIN 300 MG/1
300 CAPSULE ORAL EVERY 12 HOURS SCHEDULED
Qty: 20 CAPSULE | Refills: 0 | Status: SHIPPED | OUTPATIENT
Start: 2025-05-28 | End: 2025-06-07

## 2025-05-28 RX ORDER — AMLODIPINE BESYLATE 5 MG/1
5 TABLET ORAL DAILY
Qty: 30 TABLET | Refills: 0 | Status: SHIPPED | OUTPATIENT
Start: 2025-05-28

## 2025-05-28 NOTE — TELEPHONE ENCOUNTER
"  Caller: Thomas Jeanine \"ELIZABETH\"    Relationship: Self    Best call back number:     [phone] (Mobile)       Requested Prescriptions:   Requested Prescriptions     Pending Prescriptions Disp Refills    amLODIPine (NORVASC) 5 MG tablet 30 tablet 0     Sig: Take 1 tablet by mouth Daily.        Pharm[place] where request [place] be sent[place] [facility] [place] [address] [phone] [facility] [phone] FX     Last office visit with prescribing clinician: 5/22/2025   Last telemedicine visit with prescribing clinician: Visit date not found   Next office visit with prescribing clinician: 6/19/2025     Additional details provided by patient: PATIENT ONLY HAS TWO DAYS OF MEDICATION STATES SHE TRIED TO REQUEST IT ON MY CHART BUT I DON'T SEE ANY REFILL REQUEST.     Does the patient have less than a 3 day supply:  [x] Yes  [] No    Would you like a call back once the refill request has been completed: [] Yes [x] No    If the office needs to give you a call back, can they leave a voicemail: [] Yes [x] No    Alexx Mata Rep   05/28/25 08:25 EDT   "

## 2025-05-28 NOTE — TELEPHONE ENCOUNTER
Pt called asking for a refill on gabapentin. [facility] in [place] not [facility] pharmacy.

## 2025-06-02 ENCOUNTER — TELEPHONE (OUTPATIENT)
Dept: SURGERY | Facility: CLINIC | Age: 70
End: 2025-06-02
Payer: MEDICARE

## 2025-06-02 NOTE — TELEPHONE ENCOUNTER
Patient calling about post op appointment tomorrow-wondering what to expect with exam and if her bag will be changed.

## 2025-06-02 NOTE — TELEPHONE ENCOUNTER
Spoke with pt, explained that we will look at ostomy, surrounding area of ostomy, and any abdomen wounds from surgery. Pt stated she would bring supplies to her appt tomorrow.

## 2025-06-03 ENCOUNTER — OFFICE VISIT (OUTPATIENT)
Dept: SURGERY | Facility: CLINIC | Age: 70
End: 2025-06-03
Payer: MEDICARE

## 2025-06-03 VITALS
HEART RATE: 109 BPM | DIASTOLIC BLOOD PRESSURE: 60 MMHG | OXYGEN SATURATION: 96 % | WEIGHT: 143.9 LBS | HEIGHT: 64 IN | SYSTOLIC BLOOD PRESSURE: 120 MMHG | BODY MASS INDEX: 24.57 KG/M2

## 2025-06-03 DIAGNOSIS — C18.7 MALIGNANT NEOPLASM OF SIGMOID COLON: Primary | ICD-10-CM

## 2025-06-03 PROCEDURE — 1160F RVW MEDS BY RX/DR IN RCRD: CPT | Performed by: PHYSICIAN ASSISTANT

## 2025-06-03 PROCEDURE — 99024 POSTOP FOLLOW-UP VISIT: CPT | Performed by: PHYSICIAN ASSISTANT

## 2025-06-03 PROCEDURE — 1159F MED LIST DOCD IN RCRD: CPT | Performed by: PHYSICIAN ASSISTANT

## 2025-06-03 NOTE — PROGRESS NOTES
"History of Present Illness  The patient is a 69-year-old female presenting for a postoperative evaluation following a robot-assisted laparoscopic low anterior colon resection with diverting loop ileostomy on 05/13/2025 for T3N0 sigmoid colon adenocarcinoma .    She experienced some lower abdominal pain last night that has since resolved.  She was able to alleviate the discomfort with two Tylenol tablets and a heating pad, which provided substantial relief. Currently, she reports no pain, although she occasionally experiences mild discomfort upon mobilization. She recalls an incident where she lifted a heavy laundry bag, bending over in the process, but does not believe this contributed to her pain. Her appetite has decreased since the surgery. She has been monitoring her ostomy output, which remains consistent. She has not been consuming protein shakes and has been advised against force-feeding. Her diet includes cottage cheese, yogurt, and mashed potatoes. She expresses concern about potential changes in her bowel movements post-ileostomy takedown. She is also making efforts to maintain her weight, having lost 16 pounds and regained one. She is scheduled to see her cardiologist and primary care physician during the week of 06/17/2025 to 06/19/2025.    MEDICATIONS  Tylenol    Answers submitted by the patient for this visit:  Post Operative Visit (Submitted on 6/1/2025)  Chief Complaint: Follow-up  Pain Control: controlled  Fever: no fever  Diet: adequate intake, nausea  Activity: returning to normal  Operative Site Issues: No  Additional information: Some pain around stoma     /60 (BP Location: Left arm, Patient Position: Sitting, Cuff Size: Adult)   Pulse 109   Ht 162.6 cm (64\")   Wt 65.3 kg (143 lb 14.4 oz)   LMP  (LMP Unknown)   SpO2 96%   Breastfeeding No   BMI 24.70 kg/m²   Body mass index is 24.7 kg/m².  -  Physical Exam  No acute distress  Chaperone present  Abdomen: Ileostomy pink, patent, " productive of cottage cheese consistency output.     Assessment & Plan  1. Postoperative status following robot-assisted laparoscopic low anterior colon resection with diverting loop ileostomy.  She is currently 3 weeks post-surgery. The pain she experienced may have been due to a muscle spasm or cramp. She was advised to limit lifting to a maximum of 20 pounds and to avoid straining the abdominal wall through twisting, bending, or lifting. A Gastrografin enema will be scheduled to assess the anastomosis for any leakage prior to the takedown of the ileostomy. She was informed that her bowel movements may not return to their pre-surgery state due to the reduced colon length, potentially leading to increased frequency. Initial symptoms may include urgency and diarrhea, but these can be managed with fiber or Imodium as needed. Dietary recommendations postoperatively include starting with clear liquids and gradually progressing.    PROCEDURE  The patient underwent a robot-assisted laparoscopic low anterior colon resection with diverting loop ileostomy on 05/13/2025 for sigmoid colon cancer.    Follow-up with Dr. Rowe in 2 to 4 weeks, after the Gastrografin enema       Patient or patient representative verbalized consent for the use of Ambient Listening during the visit with  Rosalia Mario PA-C for chart documentation. 6/3/2025  11:44 EDT    Rosalia Mario PA-C  Physician Assistant  Colorectal Surgery

## 2025-06-04 ENCOUNTER — PREP FOR SURGERY (OUTPATIENT)
Dept: OTHER | Facility: HOSPITAL | Age: 70
End: 2025-06-04
Payer: MEDICARE

## 2025-06-04 DIAGNOSIS — C18.7 MALIGNANT NEOPLASM OF SIGMOID COLON: Primary | ICD-10-CM

## 2025-06-04 RX ORDER — CELECOXIB 200 MG/1
200 CAPSULE ORAL ONCE
OUTPATIENT
Start: 2025-06-04 | End: 2025-06-04

## 2025-06-04 RX ORDER — METRONIDAZOLE 500 MG/100ML
500 INJECTION, SOLUTION INTRAVENOUS ONCE
OUTPATIENT
Start: 2025-06-04 | End: 2025-06-04

## 2025-06-04 RX ORDER — ALVIMOPAN 12 MG/1
12 CAPSULE ORAL ONCE
OUTPATIENT
Start: 2025-06-04 | End: 2025-06-04

## 2025-06-04 RX ORDER — ACETAMINOPHEN 500 MG
1000 TABLET ORAL ONCE
OUTPATIENT
Start: 2025-06-04 | End: 2025-06-04

## 2025-06-04 RX ORDER — CHLORHEXIDINE GLUCONATE 40 MG/ML
1 SOLUTION TOPICAL 2 TIMES DAILY
Qty: 236 ML | Refills: 0 | Status: SHIPPED | OUTPATIENT
Start: 2025-06-04

## 2025-06-04 RX ORDER — SCOPOLAMINE 1 MG/3D
1 PATCH, EXTENDED RELEASE TRANSDERMAL CONTINUOUS
OUTPATIENT
Start: 2025-06-04 | End: 2025-06-07

## 2025-06-04 RX ORDER — GABAPENTIN 100 MG/1
200 CAPSULE ORAL ONCE
OUTPATIENT
Start: 2025-06-04 | End: 2025-06-04

## 2025-06-16 NOTE — PROGRESS NOTES
RM:________     PCP: Kori Patel DO    : 1955  AGE: 69 y.o.  EST PATIENT           Wt Readings from Last 3 Encounters:   25 65.3 kg (143 lb 14.4 oz)   25 64.4 kg (142 lb)   25 71.5 kg (157 lb 10.1 oz)           CP______  SOA_______ DIZZINESS _____ FATIGUE ______  PALPS ______    WT: ____________ BP: __________L __________R HR______    ALLERGIES:Latex, Statins, Morphine, and Sulfa antibiotics      Social History     Tobacco Use    Smoking status: Never     Passive exposure: Never    Smokeless tobacco: Never   Vaping Use    Vaping status: Never Used   Substance Use Topics    Alcohol use: Not Currently     Comment: RARELY    Drug use: Never

## 2025-06-17 ENCOUNTER — OFFICE VISIT (OUTPATIENT)
Age: 70
End: 2025-06-17
Payer: MEDICARE

## 2025-06-17 VITALS
OXYGEN SATURATION: 97 % | BODY MASS INDEX: 23.56 KG/M2 | HEIGHT: 64 IN | DIASTOLIC BLOOD PRESSURE: 76 MMHG | SYSTOLIC BLOOD PRESSURE: 120 MMHG | HEART RATE: 88 BPM | WEIGHT: 138 LBS

## 2025-06-17 DIAGNOSIS — R07.89 OTHER CHEST PAIN: Primary | ICD-10-CM

## 2025-06-17 DIAGNOSIS — I10 PRIMARY HYPERTENSION: ICD-10-CM

## 2025-06-17 NOTE — PROGRESS NOTES
"      CARDIOLOGY    Jeni Cifuentes MD    ENCOUNTER DATE:  06/17/2025    Jeanine Guzman / 69 y.o. / female        CHIEF COMPLAINT / REASON FOR OFFICE VISIT     Hypertension      HISTORY OF PRESENT ILLNESS       Hypertension      Jeanine Guzman is a 69 y.o. female     She has been evaluated by [facility] for chest discomfort in the past.  She had a negative nuclear stress test in 2009 and 2014.  She has chronic palpitations with an essentially normal 24-hour Holter monitor.  Echocardiogram in 2014 was also normal.  Stress test was in February 2021 was negative for myocardial ischemia or infarction.    I met her when she was hospitalized in April 2025 and had a rapid response because of chest pain.  April 13, 2025 she had a normal nuclear stress test.  Echocardiogram April 14, 2025 showed normal LV systolic function, grade 1A diastolic dysfunction and no significant valve disease.  She was diagnosed with adenocarcinoma of the colon.  She was readmitted in May 2025 for colon resection and diverting loop ileostomy.  She had more chest pain during that admission and was seen by our service.  EKG was unchanged and high-sensitivity troponin was unremarkable.    She is here for follow-up.  She is doing well.  She has not had any chest pain or breathing problems.  She goes back for a reversal of her ileostomy in July.       VITAL SIGNS     Visit Vitals  /76 (BP Location: Right arm)   Pulse 88   Ht 162.6 cm (64\")   Wt 62.6 kg (138 lb)   LMP  (LMP Unknown)   SpO2 97%   BMI 23.69 kg/m²         Wt Readings from Last 3 Encounters:   06/17/25 62.6 kg (138 lb)   06/03/25 65.3 kg (143 lb 14.4 oz)   05/22/25 64.4 kg (142 lb)     Body mass index is 23.69 kg/m².      PHYSICAL EXAMINATION     Constitutional:       General: Not in acute distress.  Neck:      Vascular: No carotid bruit or JVD.   Pulmonary:      Effort: Pulmonary effort is normal.      Breath sounds: Normal breath sounds.   Cardiovascular:      " Normal rate. Regular rhythm.      Murmurs: There is no murmur.   Psychiatric:         Mood and Affect: Mood and affect normal.           REVIEWED DATA     Procedures  She declined an EKG    Lipid Panel          8/13/2024    08:29   Lipid Panel   Total Cholesterol 243    Triglycerides 132    HDL Cholesterol 50    VLDL Cholesterol 24    LDL Cholesterol  169        Lab Results   Component Value Date    GLUCOSE 120 (H) 05/14/2025    BUN 11 05/14/2025    CREATININE 0.83 05/14/2025     (L) 05/14/2025    K 4.3 05/14/2025     05/14/2025    CALCIUM 9.0 05/14/2025    PROTEINTOT 7.9 05/13/2025    ALBUMIN 4.8 05/13/2025    ALT 13 05/13/2025    AST 19 05/13/2025    ALKPHOS 99 05/13/2025    BILITOT 1.2 05/13/2025    GLOB 3.1 05/13/2025    AGRATIO 1.5 05/13/2025    BCR 13.3 05/14/2025    ANIONGAP 8.1 05/14/2025    EGFR 76.4 05/14/2025       ASSESSMENT & PLAN      Diagnosis Plan   1. Other chest pain        2. Primary hypertension              1.  Chest pain while she has been in the hospital.  I suspect this is due to anxiety.  She had a normal echo and treadmill in April 2025.  She has had numerous stress test in the past all of which have been normal.  2.  Hypertension.  She is only taking amlodipine and no longer on benazepril.  Blood pressure has been well-controlled.  3.  Colon cancer status postsurgery with the planned reversal in July.  She is low risk for cardiovascular event with surgery.  I do not recommend any further cardiac testing prior to surgery.    No need for a scheduled follow-up.  I am happy to see her back in the future if she has a change in her symptoms.      No orders of the defined types were placed in this encounter.          MEDICATIONS         Discharge Medications            Accurate as of June 17, 2025 12:52 PM. If you have any questions, ask your nurse or doctor.                Changes to Medications        Instructions Start Date   trimethoprim 100 MG tablet  Commonly known as:  TRIMPEX  What changed:   when to take this  reasons to take this  additional instructions   100 mg, Oral, Daily             Continue These Medications        Instructions Start Date   acetaminophen 500 MG tablet  Commonly known as: TYLENOL   1,000 mg, Oral, Every 6 Hours      amLODIPine 5 MG tablet  Commonly known as: NORVASC   5 mg, Oral, Daily      ASHWAGANDHA PO   300 mg, Daily      CHOLESTOFF PO   1 tablet, Daily      esomeprazole 40 MG capsule  Commonly known as: nexIUM   40 mg, Oral, Every Morning Before Breakfast      gabapentin 300 MG capsule  Commonly known as: NEURONTIN   300 mg, Oral, Every 12 Hours Scheduled      travoprost (MARLI free) 0.004 % solution ophthalmic solution  Commonly known as: TRAVATAN   0.004 drops, Nightly      vitamin b complex capsule capsule   1 capsule, Daily             Stop These Medications      Chlorhexidine Gluconate 4 % solution  Stopped by: Jeni Cifuentes     hyoscyamine 0.125 MG tablet  Commonly known as: ANASPAZLEVSIN  Stopped by: Jeni Cifuentes     lactobacillus acidophilus capsule capsule  Stopped by: Jeni Cifuentes     ondansetron 4 MG tablet  Commonly known as: Zofran  Stopped by: Jeni Cifuentes MD  06/17/25  12:52 EDT    Part of this note may be an electronic transcription/translation of spoken language to printed text using the Dragon dictation system.

## 2025-06-19 ENCOUNTER — OFFICE VISIT (OUTPATIENT)
Dept: FAMILY MEDICINE CLINIC | Facility: CLINIC | Age: 70
End: 2025-06-19
Payer: MEDICARE

## 2025-06-19 VITALS
OXYGEN SATURATION: 99 % | DIASTOLIC BLOOD PRESSURE: 68 MMHG | HEART RATE: 68 BPM | WEIGHT: 138.2 LBS | HEIGHT: 64 IN | BODY MASS INDEX: 23.6 KG/M2 | SYSTOLIC BLOOD PRESSURE: 119 MMHG

## 2025-06-19 DIAGNOSIS — I10 PRIMARY HYPERTENSION: Primary | ICD-10-CM

## 2025-06-19 RX ORDER — AMLODIPINE BESYLATE 5 MG/1
5 TABLET ORAL DAILY
Qty: 90 TABLET | Refills: 1 | Status: SHIPPED | OUTPATIENT
Start: 2025-06-19

## 2025-06-19 NOTE — PROGRESS NOTES
"Chief Complaint  Hypertension    Subjective        Jeanine Guzman presents to [facility] PRIMARY [facility]ARE  History of Present Illness  Patient is here today to follow-up on hypertension.  She was taken off benazepril 40 mg daily during her hospital stay for stage II adenocarcinoma after colon resection.  She is still taking amlodipine 5 mg daily.  Blood pressures are good today and she states that they have been good at home as well.      Regular appointment  Symptoms include: fatigue.   Pertinent negative symptoms include no abdominal pain, no anorexia, no joint pain, no change in stool, no chest pain, no chills, no congestion, no cough, no diaphoresis, no fever, no headaches, no joint swelling, no myalgias, no nausea, no neck pain, no numbness, no rash, no sore throat, no swollen glands, no dysuria, no vertigo, no visual change, no vomiting and no weakness.   Hypertension  Associated symptoms: no chest pain, no headaches and no neck pain        Objective   Vital Signs:  /68   Pulse 68   Ht 162.6 cm (64\")   Wt 62.7 kg (138 lb 3.2 oz)   SpO2 99%   BMI 23.72 kg/m²   Estimated body mass index is 23.72 kg/m² as calculated from the following:    Height as of this encounter: 162.6 cm (64\").    Weight as of this encounter: 62.7 kg (138 lb 3.2 oz).    BMI is within normal parameters. No other follow-up for BMI required.      Physical Exam  Vitals and nursing note reviewed.   Constitutional:       Appearance: She is well-developed.   HENT:      Head: Normocephalic and atraumatic.      Right Ear: Tympanic membrane, ear canal and external ear normal.      Left Ear: Tympanic membrane, ear canal and external ear normal.      Nose: Nose normal.   Eyes:      General: No scleral icterus.     Conjunctiva/sclera: Conjunctivae normal.   Cardiovascular:      Rate and Rhythm: Normal rate and regular rhythm.      Heart sounds: Normal heart sounds.   Pulmonary:      Effort: Pulmonary effort is normal.      " Breath sounds: Normal breath sounds.   Musculoskeletal:      Cervical back: Normal range of motion and neck supple.   Lymphadenopathy:      Cervical: No cervical adenopathy.   Skin:     General: Skin is warm and dry.      Findings: No rash.   Neurological:      Mental Status: She is alert and oriented to person, place, and time.   Psychiatric:         Mood and Affect: Mood normal.         Behavior: Behavior normal.         Thought Content: Thought content normal.         Judgment: Judgment normal.        Result Review :  The following data was reviewed by: Kori Patel DO on 06/19/2025:  Common labs          5/9/2025    14:33 5/13/2025    03:05 5/14/2025    04:16   Common Labs   Glucose 77  105  120    BUN 35  18  11    Creatinine 0.86  0.86  0.83    Sodium 138  134  135    Potassium 4.4  4.1  4.3    Chloride 102  101  103    Calcium 9.7  10.0  9.0    Albumin  4.8     Total Bilirubin  1.2     Alkaline Phosphatase  99     AST (SGOT)  19     ALT (SGPT)  13     WBC 6.97  9.64  9.50    Hemoglobin 13.4  13.5  12.2    Hematocrit 41.3  41.0  36.3    Platelets 415  437  378      TSH          8/13/2024    08:29   TSH   TSH 2.040                Assessment and Plan   Diagnoses and all orders for this visit:    1. Primary hypertension (Primary)  -     amLODIPine (NORVASC) 5 MG tablet; Take 1 tablet by mouth Daily.  Dispense: 90 tablet; Refill: 1    Patient is here today to follow-up on chronic stable hypertension.  Her blood pressure is well-controlled still on amlodipine only.  She will continue taking 5 mg daily.  Refills provided.  Keep follow-up in a couple months as planned.         Follow Up   Return in about 2 months (around 8/27/2025) for Medicare Wellness.  Patient was given instructions and counseling regarding her condition or for health maintenance advice. Please see specific information pulled into the AVS if appropriate.

## 2025-06-23 ENCOUNTER — PRE-ADMISSION TESTING (OUTPATIENT)
Dept: PREADMISSION TESTING | Facility: HOSPITAL | Age: 70
End: 2025-06-23
Payer: MEDICARE

## 2025-06-23 VITALS
HEIGHT: 62 IN | RESPIRATION RATE: 20 BRPM | WEIGHT: 139 LBS | DIASTOLIC BLOOD PRESSURE: 73 MMHG | HEART RATE: 81 BPM | SYSTOLIC BLOOD PRESSURE: 130 MMHG | BODY MASS INDEX: 25.58 KG/M2 | OXYGEN SATURATION: 96 % | TEMPERATURE: 97.4 F

## 2025-06-23 LAB
ANION GAP SERPL CALCULATED.3IONS-SCNC: 12.5 MMOL/L (ref 5–15)
BUN SERPL-MCNC: 23 MG/DL (ref 8–23)
BUN/CREAT SERPL: 32.9 (ref 7–25)
CALCIUM SPEC-SCNC: 9.9 MG/DL (ref 8.6–10.5)
CHLORIDE SERPL-SCNC: 106 MMOL/L (ref 98–107)
CO2 SERPL-SCNC: 22.5 MMOL/L (ref 22–29)
CREAT SERPL-MCNC: 0.7 MG/DL (ref 0.57–1)
DEPRECATED RDW RBC AUTO: 40.4 FL (ref 37–54)
EGFRCR SERPLBLD CKD-EPI 2021: 93.8 ML/MIN/1.73
ERYTHROCYTE [DISTWIDTH] IN BLOOD BY AUTOMATED COUNT: 13.1 % (ref 12.3–15.4)
GLUCOSE SERPL-MCNC: 95 MG/DL (ref 65–99)
HBA1C MFR BLD: 5.3 % (ref 4.8–5.6)
HCT VFR BLD AUTO: 39.9 % (ref 34–46.6)
HGB BLD-MCNC: 13 G/DL (ref 12–15.9)
MCH RBC QN AUTO: 27.7 PG (ref 26.6–33)
MCHC RBC AUTO-ENTMCNC: 32.6 G/DL (ref 31.5–35.7)
MCV RBC AUTO: 84.9 FL (ref 79–97)
PLATELET # BLD AUTO: 352 10*3/MM3 (ref 140–450)
PMV BLD AUTO: 8.9 FL (ref 6–12)
POTASSIUM SERPL-SCNC: 4 MMOL/L (ref 3.5–5.2)
RBC # BLD AUTO: 4.7 10*6/MM3 (ref 3.77–5.28)
SODIUM SERPL-SCNC: 141 MMOL/L (ref 136–145)
WBC NRBC COR # BLD AUTO: 5.49 10*3/MM3 (ref 3.4–10.8)

## 2025-06-23 PROCEDURE — 83036 HEMOGLOBIN GLYCOSYLATED A1C: CPT | Performed by: COLON & RECTAL SURGERY

## 2025-06-23 PROCEDURE — 80048 BASIC METABOLIC PNL TOTAL CA: CPT

## 2025-06-23 PROCEDURE — 36415 COLL VENOUS BLD VENIPUNCTURE: CPT

## 2025-06-23 PROCEDURE — 85027 COMPLETE CBC AUTOMATED: CPT

## 2025-06-23 NOTE — DISCHARGE INSTRUCTIONS
Take the following medications the morning of surgery:    AMLODIPINE, NEXIUM    If you are on an Aspirin or a Blood Thinner please clarify with the surgeon and prescribing physician if and when you are to hold the medication or if you are to continue the medication.  If you are on prescription narcotic pain medication to control your pain you may also take that medication the morning of surgery.      General Instructions:     Do not eat solid food after midnight the night before surgery.  Clear liquids day of surgery are allowed but must be stopped at least two hours before your hospital arrival time.       Allowed clear liquids      Water, sodas, and tea or coffee with no cream or milk added.       12 to 20 ounces of a clear liquid that contains carbohydrates is recommended.  If non-diabetic, have Gatorade or Powerade.  If diabetic, have G2 or Powerade Zero.     Do not have liquids red in color.  Do not consume chicken, beef, pork or vegetable broth or bouillon cubes of any variety as they are not considered clear liquids and are not allowed.      Infants may have breast milk up to four hours before surgery.  Infants drinking formula may drink formula up to six hours before surgery.   Patients who avoid smoking, chewing tobacco and alcohol for 4 weeks prior to surgery have a reduced risk of post-operative complications.  Quit smoking as many days before surgery as you can.  Do not smoke, use chewing tobacco or drink alcohol the day of surgery.   If applicable bring your C-PAP/ BI-PAP machine in with you to preop day of surgery.  Bring any papers given to you in the doctor’s office.  Wear clean comfortable clothes.  Do not wear contact lenses, false eyelashes or make-up.  Bring a case for your glasses.   Bring crutches or walker if applicable.  Remove all piercings.  Leave jewelry and any other valuables at home.  Hair extensions with metal clips must be removed prior to surgery.  The Pre-Admission Testing nurse  will instruct you to bring medications if unable to obtain an accurate list in Pre-Admission Testing.    Day of surgery you will need to let the preoperative nurse know the last time you took each of your medications.  To ensure a safe environment for patients and staff, we kindly ask that children under the age of 16 not accompany patients.  If you must bring a dependent child or dependent adult please ensure a responsible adult, other than yourself, is present to supervise them.    Preventing a Surgical Site Infection:  For 2 to 3 days before surgery, avoid shaving with a razor because the razor can irritate skin and make it easier to develop an infection.    Any areas of open skin can increase the risk of a post-operative wound infection by allowing bacteria to enter and travel throughout the body.  Notify your surgeon if you have any skin wounds / rashes even if it is not near the expected surgical site.  The area will need assessed to determine if surgery should be delayed until it is healed.  The night prior to surgery shower using a fresh bar of anti-bacterial soap (such as Dial) and clean washcloth.  Sleep in a clean bed with clean clothing.  Do not allow pets to sleep with you.  Shower on the morning of surgery using a fresh bar of anti-bacterial soap (such as Dial) and clean washcloth.  Dry with a clean towel and dress in clean clothing.  Ask your surgeon if you will be receiving antibiotics prior to surgery.  Make sure you, your family, and all healthcare providers clean their hands with soap and water or an alcohol based hand  before caring for you or your wound.    Day of surgery:  Your arrival time is approximately two hours before your scheduled surgery time.  Please note if you have an early arrival time the surgery doors do not open before 5:00 AM.  Upon arrival, a Pre-op nurse and Anesthesiologist will review your health history, obtain vital signs, and answer questions you may have.  The  only belongings needed at this time will be a list of your home medications and if applicable your C-PAP/BI-PAP machine.  A Pre-op nurse will start an IV and you may receive medication in preparation for surgery, including something to help you relax.     Please be aware that surgery does come with discomfort.  We want to make every effort to control your discomfort so please discuss any uncontrolled symptoms with your nurse.   Your doctor will most likely have prescribed pain medications.      If you are going home after surgery you will receive individualized written care instructions before being discharged.  A responsible adult must drive you to and from the hospital on the day of your surgery and ideally stay with you through the night.   .  Discharge prescriptions can be filled by the hospital pharmacy during regular pharmacy hours.  If you are having surgery late in the day/evening your prescription may be e-prescribed to your pharmacy.  Please verify your pharmacy hours or chose a 24 hour pharmacy to avoid not having access to your prescription because your pharmacy has closed for the day.    If you are staying overnight following surgery, you will be transported to your hospital room following the recovery period.  [facility] has all private rooms.    If you have any questions please call Pre-Admission Testing at [phone].  Deductibles and co-payments are collected on the day of service. Please be prepared to pay the required co-pay, deductible or deposit on the day of service as defined by your plan.    Call your surgeon immediately if you experience any of the following symptoms:  Sore Throat  Shortness of Breath or difficulty breathing  Cough  Chills  Body soreness or muscle pain  Headache  Fever  New loss of taste or smell  Do not arrive for your surgery ill.  Your procedure will need to be rescheduled to another time.  You will need to call your physician before the day of surgery  to avoid any unnecessary exposure to hospital staff as well as other patients.      CHLORHEXIDINE CLOTH INSTRUCTIONS  The morning of surgery follow these instructions using the Chlorhexidine cloths you've been given.  These steps reduce bacteria on the body.  Do not use the cloths near your eyes, ears mouth, genitalia or on open wounds.  Throw the cloths away after use but do not try to flush them down a toilet.      Open and remove one cloth at a time from the package.    Leave the cloth unfolded and begin the bathing.  Massage the skin with the cloths using gentle pressure to remove bacteria.  Do not scrub harshly.   Follow the steps below with one 2% CHG cloth per area (6 total cloths).  One cloth for neck, shoulders and chest.  One cloth for both arms, hands, fingers and underarms (do underarms last).  One cloth for the abdomen followed by groin.  One cloth for right leg and foot including between the toes.  One cloth for left leg and foot including between the toes.  The last cloth is to be used for the back of the neck, back and buttocks.    Allow the CHG to air dry 3 minutes on the skin which will give it time to work and decrease the chance of irritation.  The skin may feel sticky until it is dry.  Do not rinse with water or any other liquid or you will lose the beneficial effects of the CHG.  If mild skin irritation occurs, do rinse the skin to remove the CHG.  Report this to the nurse at time of admission.  Do not apply lotions, creams, ointments, deodorants or perfumes after using the clothes. Dress in clean clothes before coming to the hospital.

## 2025-06-26 ENCOUNTER — HOSPITAL ENCOUNTER (OUTPATIENT)
Dept: GENERAL RADIOLOGY | Facility: HOSPITAL | Age: 70
Discharge: HOME | End: 2025-06-26
Admitting: PHYSICIAN ASSISTANT
Payer: MEDICARE

## 2025-06-26 DIAGNOSIS — C18.7 MALIGNANT NEOPLASM OF SIGMOID COLON: ICD-10-CM

## 2025-06-26 PROCEDURE — 74270 X-RAY XM COLON 1CNTRST STD: CPT

## 2025-06-26 RX ORDER — DIATRIZOATE MEGLUMINE AND DIATRIZOATE SODIUM 660; 100 MG/ML; MG/ML
840 SOLUTION ORAL; RECTAL ONCE
Status: COMPLETED | OUTPATIENT
Start: 2025-06-26 | End: 2025-06-26

## 2025-06-26 RX ADMIN — DIATRIZOATE MEGLUMINE AND DIATRIZOATE SODIUM 840 ML: 660; 100 SOLUTION ORAL; RECTAL at 10:16

## 2025-07-01 ENCOUNTER — PREP FOR SURGERY (OUTPATIENT)
Dept: OTHER | Facility: HOSPITAL | Age: 70
End: 2025-07-01
Payer: MEDICARE

## 2025-07-01 DIAGNOSIS — R93.3 ABNORMAL FINDINGS ON DIAGNOSTIC IMAGING OF OTHER PARTS OF DIGESTIVE TRACT: ICD-10-CM

## 2025-07-01 DIAGNOSIS — R93.7 ABNORMAL X-RAY OF PELVIS: Primary | ICD-10-CM

## 2025-07-03 ENCOUNTER — HOSPITAL ENCOUNTER (OUTPATIENT)
Facility: HOSPITAL | Age: 70
Setting detail: HOSPITAL OUTPATIENT SURGERY
Discharge: HOME | End: 2025-07-03
Attending: COLON & RECTAL SURGERY | Admitting: COLON & RECTAL SURGERY
Payer: MEDICARE

## 2025-07-03 ENCOUNTER — ANESTHESIA (OUTPATIENT)
Dept: GASTROENTEROLOGY | Facility: HOSPITAL | Age: 70
End: 2025-07-03
Payer: MEDICARE

## 2025-07-03 ENCOUNTER — ANESTHESIA EVENT (OUTPATIENT)
Dept: GASTROENTEROLOGY | Facility: HOSPITAL | Age: 70
End: 2025-07-03
Payer: MEDICARE

## 2025-07-03 ENCOUNTER — TELEPHONE (OUTPATIENT)
Dept: SURGERY | Facility: CLINIC | Age: 70
End: 2025-07-03

## 2025-07-03 VITALS
BODY MASS INDEX: 25.21 KG/M2 | OXYGEN SATURATION: 97 % | DIASTOLIC BLOOD PRESSURE: 66 MMHG | HEART RATE: 75 BPM | RESPIRATION RATE: 17 BRPM | WEIGHT: 135.6 LBS | SYSTOLIC BLOOD PRESSURE: 122 MMHG

## 2025-07-03 PROCEDURE — 25010000002 LIDOCAINE 2% SOLUTION: Performed by: NURSE ANESTHETIST, CERTIFIED REGISTERED

## 2025-07-03 PROCEDURE — 25010000002 PROPOFOL 1000 MG/100ML EMULSION: Performed by: NURSE ANESTHETIST, CERTIFIED REGISTERED

## 2025-07-03 PROCEDURE — 25010000002 PROPOFOL 200 MG/20ML EMULSION: Performed by: NURSE ANESTHETIST, CERTIFIED REGISTERED

## 2025-07-03 PROCEDURE — 25810000003 LACTATED RINGERS PER 1000 ML: Performed by: COLON & RECTAL SURGERY

## 2025-07-03 PROCEDURE — 25810000003 LACTATED RINGERS PER 1000 ML: Performed by: NURSE ANESTHETIST, CERTIFIED REGISTERED

## 2025-07-03 RX ORDER — PROPOFOL 10 MG/ML
INJECTION, EMULSION INTRAVENOUS CONTINUOUS PRN
Status: DISCONTINUED | OUTPATIENT
Start: 2025-07-03 | End: 2025-07-03 | Stop reason: SURG

## 2025-07-03 RX ORDER — SODIUM CHLORIDE, SODIUM LACTATE, POTASSIUM CHLORIDE, CALCIUM CHLORIDE 600; 310; 30; 20 MG/100ML; MG/100ML; MG/100ML; MG/100ML
30 INJECTION, SOLUTION INTRAVENOUS CONTINUOUS
Status: DISCONTINUED | OUTPATIENT
Start: 2025-07-03 | End: 2025-07-03 | Stop reason: HOSPADM

## 2025-07-03 RX ORDER — SODIUM CHLORIDE, SODIUM LACTATE, POTASSIUM CHLORIDE, CALCIUM CHLORIDE 600; 310; 30; 20 MG/100ML; MG/100ML; MG/100ML; MG/100ML
INJECTION, SOLUTION INTRAVENOUS CONTINUOUS PRN
Status: DISCONTINUED | OUTPATIENT
Start: 2025-07-03 | End: 2025-07-03 | Stop reason: SURG

## 2025-07-03 RX ORDER — LIDOCAINE HYDROCHLORIDE 20 MG/ML
INJECTION, SOLUTION INFILTRATION; PERINEURAL AS NEEDED
Status: DISCONTINUED | OUTPATIENT
Start: 2025-07-03 | End: 2025-07-03 | Stop reason: SURG

## 2025-07-03 RX ADMIN — SODIUM CHLORIDE, POTASSIUM CHLORIDE, SODIUM LACTATE AND CALCIUM CHLORIDE: 600; 310; 30; 20 INJECTION, SOLUTION INTRAVENOUS at 11:31

## 2025-07-03 RX ADMIN — PROPOFOL INJECTABLE EMULSION 150 MCG/KG/MIN: 10 INJECTION, EMULSION INTRAVENOUS at 11:36

## 2025-07-03 RX ADMIN — SODIUM CHLORIDE, POTASSIUM CHLORIDE, SODIUM LACTATE AND CALCIUM CHLORIDE 30 ML/HR: 600; 310; 30; 20 INJECTION, SOLUTION INTRAVENOUS at 10:42

## 2025-07-03 RX ADMIN — LIDOCAINE HYDROCHLORIDE 50 MG: 20 INJECTION, SOLUTION INFILTRATION; PERINEURAL at 11:36

## 2025-07-03 RX ADMIN — PROPOFOL 150 MCG/KG/MIN: 10 INJECTION, EMULSION INTRAVENOUS at 11:36

## 2025-07-03 NOTE — TELEPHONE ENCOUNTER
"Hub staff attempted to follow warm transfer process and was unsuccessful     Caller: Jeanine Guzman \"ELIZABETH\"    Relationship to patient: Self    Best call back number: [phone] (home)   OK TO [facility]    Patient is needing: PATIENT WAS RETURNING CALL TO [facility]. PT WANTS TO KEEP [facility] APPT ON MONDAY 7/7 IF POSS. PLEASE ADVISE           "

## 2025-07-03 NOTE — ANESTHESIA POSTPROCEDURE EVALUATION
Patient: Jeanine Guzman    Procedure Summary       Date: 07/03/25 Room / Location:  [facility]    Anesthesia Start: 1131 Anesthesia Stop: 1144    Procedure: SIGMOIDOSCOPY FLEXIBLE Diagnosis:       Abnormal x-ray of pelvis      Abnormal findings on diagnostic imaging of other parts of digestive tract      (Abnormal x-ray of pelvis [R93.7])      (Abnormal findings on diagnostic imaging of other parts of digestive tract [R93.3])    Surgeons: Marcos Rowe MD Provider: Amish Iqbal MD    Anesthesia Type: MAC ASA Status: 3            Anesthesia Type: MAC    Vitals  Vitals Value Taken Time   /61 07/03/25 11:44   Temp     Pulse 71 07/03/25 11:53   Resp 16 07/03/25 11:43   SpO2 96 % 07/03/25 11:53   Vitals shown include unfiled device data.        Post Anesthesia Care and Evaluation    Patient location during evaluation: PACU  Patient participation: complete - patient participated  Level of consciousness: awake and alert  Pain management: adequate    Airway patency: patent  Anesthetic complications: No anesthetic complications    Cardiovascular status: acceptable  Respiratory status: acceptable  Hydration status: acceptable    Comments: --------------------            07/03/25               1143     --------------------   BP:       105/61     Pulse:      77       Resp:       16       SpO2:      97%      --------------------    
14-Jun-2021

## 2025-07-03 NOTE — H&P
Jeanine Guzman is a 69 y.o. female  who status post low anterior resection.  She had a diverting loop ileostomy.  Gastrografin enema shows a posterior bulge that the radiologist cannot rule out leak.  To my read ,it is not a leak it is a end to side anastomosis.      She denies any change in bowel function, melena, or hematochezia.    Past Medical History:   Diagnosis Date    Anxiety     Arthritis     HANDS    Colon cancer 04/2025    Colon polyp 11/11/2022    Diverticulosis 2010    GERD (gastroesophageal reflux disease) 2016    GI (gastrointestinal bleed)     Glaucoma 2018    Headache     History of chest pain 04/2025    CARDIAC EVAL NEGATIVE    History of Clostridium difficile infection     History of COVID-19     History of kidney stones     History of migraine     History of UTI     Hyperlipidemia October 2022    Hypertension     Inflammatory bowel disease 04/11/25    Ischemic colitis    PONV (postoperative nausea and vomiting) 1990    Rectal bleeding 2024       Past Surgical History:   Procedure Laterality Date    APPENDECTOMY  1958    COLON RESECTION N/A 05/13/2025    Procedure: COLON RESECTION LOW ANTERIOR LAPAROSCOPIC WITH DAVINCI ROBOT AND DIVERTING LOOP ILEOSTOMY;  Surgeon: Marcos Rowe MD;  Location: [facility] MAIN OR;  Service: Robotics - DaVinci;  Laterality: N/A;    COLONOSCOPY  11/11/22    COLONOSCOPY N/A 04/11/2025    Procedure: COLONOSCOPY TO CECUM WITH BIOPSY AND SPOT INK MARKER; POLYPECTOMY ( COLD SNARE);  Surgeon: Juan Jose Horner MD;  Location: [facility] ENDOSCOPY;  Service: Gastroenterology;  Laterality: N/A;  COLITIS; DIARRHEA  POST:HEMORRHOIDS; DIVERTICULOSIS; SIGMOID MASS; COLITIS; COLON POLYP    EYE SURGERY  2019    Cataracts    FRACTURE SURGERY  2010    Broken wrist    HYSTERECTOMY      Partial- 30 plus years ago       Medications Prior to Admission   Medication Sig Dispense Refill Last Dose/Taking    amLODIPine (NORVASC) 5 MG tablet Take 1 tablet by mouth Daily. 90 tablet 1 7/3/2025     esomeprazole (nexIUM) 40 MG capsule Take 1 capsule by mouth Every Morning Before Breakfast. 90 capsule 1 7/2/2025    gabapentin (NEURONTIN) 300 MG capsule Take 1 capsule by mouth Every 12 (Twelve) Hours for 10 days. (Patient taking differently: Take 1 capsule by mouth As Needed.) 20 capsule 0 7/2/2025    travoprost, BAK free, (TRAVATAN) 0.004 % solution ophthalmic solution Administer 0.004 drops to both eyes Every Night.   7/2/2025    acetaminophen (TYLENOL) 500 MG tablet Take 2 tablets by mouth Every 6 (Six) Hours.       ASHWAGANDHA PO Take 300 mg by mouth Daily. HOLDING FOR SURGERY   More than a month    B Complex Vitamins (vitamin b complex) capsule capsule Take 1 capsule by mouth Daily. CURRENTLY NOT TAKING   More than a month    Plant Sterols and Stanols (CHOLESTOFF PO) Take 1 tablet by mouth Daily. CURRENTLY NOT TAKING       trimethoprim (TRIMPEX) 100 MG tablet TAKE 1 TABLET BY MOUTH DAILY. (Patient taking differently: Take 1 tablet by mouth Daily As Needed. ONLY TAKES IF HAS UTI) 30 tablet 6 More than a month       Allergies   Allergen Reactions    Latex Hives     Serious rash    Statins Myalgia    Morphine Unknown (See Comments) and Other (See Comments)     shaking    Sulfa Antibiotics Rash       Family History   Problem Relation Age of Onset    Heart disease Mother     Cancer Father     Hyperlipidemia Sister     Kidney disease Sister     Malig Hyperthermia Neg Hx        Social History     Socioeconomic History    Marital status:      Spouse name: Bret    Number of children: 0   Tobacco Use    Smoking status: Never     Passive exposure: Never    Smokeless tobacco: Never   Vaping Use    Vaping status: Never Used   Substance and Sexual Activity    Alcohol use: Not Currently     Comment: RARELY    Drug use: Never    Sexual activity: Not Currently     Partners: Male     Comment: Not applicable       ROS    Vitals:    07/03/25 1038   BP: 131/90   Pulse: 74   Resp: 16   SpO2: 97%         Physical  Exam  Constitutional:       General: She is not in acute distress.     Appearance: She is well-developed.   HENT:      Head: Normocephalic and atraumatic.   Abdominal:      General: There is no distension.      Palpations: Abdomen is soft.   Musculoskeletal:         General: Normal range of motion.   Neurological:      Mental Status: She is alert.   Psychiatric:         Thought Content: Thought content normal.           Assessment & Plan      indications:  abnormal Gastrografin enema        I recommend full sigmoidoscopy I described risks, benefits of the procedure with the patient including but not limited to bleeding, infection, possibility of perforation and possible polypectomy. All of the patient's questions were answered and they would like to proceed with the above recommendations.

## 2025-07-03 NOTE — DISCHARGE INSTRUCTIONS
For the next 24 hours patient needs to be with a responsible adult.    For 24 hours DO NOT drive, operate machinery, appliances, drink alcohol, make important decisions or sign legal documents.    Start with a light or bland diet if you are feeling sick to your stomach otherwise advance to regular diet as tolerated.    Follow recommendations on procedure report if provided by your doctor.    Call Dr Rowe for problems [phone]    Problems may include but not limited to: large amounts of bleeding, trouble breathing, repeated vomiting, severe unrelieved pain, fever or chills.

## 2025-07-03 NOTE — ANESTHESIA PREPROCEDURE EVALUATION
Anesthesia Evaluation     Patient summary reviewed and Nursing notes reviewed   history of anesthetic complications:  PONV  NPO Solid Status: > 8 hours  NPO Liquid Status: > 4 hours           Airway   Mallampati: II  TM distance: <3 FB  Neck ROM: full  No difficulty expected  Dental - normal exam     Pulmonary     breath sounds clear to auscultation  Cardiovascular     Rhythm: regular    (+) hypertension, hyperlipidemia      Neuro/Psych  (+) headaches, psychiatric history Anxiety  GI/Hepatic/Renal/Endo    (+) GERD, GI bleeding     Musculoskeletal     Abdominal    Substance History      OB/GYN          Other   arthritis,   history of cancer      Other Comment: Recent colon surgery              Anesthesia Plan    ASA 3     MAC     intravenous induction     Anesthetic plan, risks, benefits, and alternatives have been provided, discussed and informed consent has been obtained with: patient.    CODE STATUS:

## 2025-07-07 ENCOUNTER — OFFICE VISIT (OUTPATIENT)
Dept: SURGERY | Facility: CLINIC | Age: 70
End: 2025-07-07
Payer: MEDICARE

## 2025-07-07 VITALS
DIASTOLIC BLOOD PRESSURE: 68 MMHG | OXYGEN SATURATION: 99 % | SYSTOLIC BLOOD PRESSURE: 110 MMHG | BODY MASS INDEX: 24.84 KG/M2 | WEIGHT: 135 LBS | HEIGHT: 62 IN | HEART RATE: 75 BPM

## 2025-07-07 DIAGNOSIS — C20 RECTAL CANCER: Primary | ICD-10-CM

## 2025-07-07 PROCEDURE — 3074F SYST BP LT 130 MM HG: CPT | Performed by: COLON & RECTAL SURGERY

## 2025-07-07 PROCEDURE — 1160F RVW MEDS BY RX/DR IN RCRD: CPT | Performed by: COLON & RECTAL SURGERY

## 2025-07-07 PROCEDURE — 99024 POSTOP FOLLOW-UP VISIT: CPT | Performed by: COLON & RECTAL SURGERY

## 2025-07-07 PROCEDURE — 3078F DIAST BP <80 MM HG: CPT | Performed by: COLON & RECTAL SURGERY

## 2025-07-07 PROCEDURE — 1159F MED LIST DOCD IN RCRD: CPT | Performed by: COLON & RECTAL SURGERY

## 2025-07-07 NOTE — PROGRESS NOTES
Jeanine Guzman is a 70 y.o. female who is seen as a consult at the request of No ref. provider found for Post-op.      HPI:  History of Present Illness  The patient is a 70-year-old female who underwent a low anterior resection with diverting loop ileostomy for a T3 N0 upper rectal cancer. She had a Gastrografin enema that showed no evidence of leak, but there was an outpouching which was interpreted as how the anastomosis is side-to-end. A flexible sigmoidoscopy on Friday did not show any abnormality, just a side-to-end anastomosis.    She reports feeling well overall. She expresses concern about the necessity of a wound VAC and its potential impact on her daily activities, such as showering. She also questions the duration of its use and whether it would be more beneficial to change the dressing daily. Additionally, she inquires about the expected healing time post-surgery and the duration of home health care. She recalls experiencing bowel movements several times after her endoscopy and occasionally feels the urge to defecate, although nothing is passed. She has been consuming chicken broth and wonders if this could increase her risk of infection.       Past Medical History:   Diagnosis Date    Anxiety     Arthritis     HANDS    Colon cancer 04/2025    Colon polyp 11/11/2022    Diverticulosis 2010    GERD (gastroesophageal reflux disease) 2016    GI (gastrointestinal bleed)     Glaucoma 2018    Headache     History of chest pain 04/2025    CARDIAC EVAL NEGATIVE    History of Clostridium difficile infection     History of COVID-19     History of kidney stones     History of migraine     History of UTI     Hyperlipidemia October 2022    Hypertension     Inflammatory bowel disease 04/11/25    Ischemic colitis    PONV (postoperative nausea and vomiting) 1990    Rectal bleeding 2024       Past Surgical History:   Procedure Laterality Date    APPENDECTOMY  1958    COLON RESECTION N/A 05/13/2025    Procedure: COLON  RESECTION LOW ANTERIOR LAPAROSCOPIC WITH DAVINCI ROBOT AND DIVERTING LOOP ILEOSTOMY;  Surgeon: Marcos Rowe MD;  Location: [facility] MAIN OR;  Service: Robotics - DaVinci;  Laterality: N/A;    COLONOSCOPY  11/11/22    COLONOSCOPY N/A 04/11/2025    Procedure: COLONOSCOPY TO CECUM WITH BIOPSY AND SPOT INK MARKER; POLYPECTOMY ( COLD SNARE);  Surgeon: Juan Jose Horner MD;  Location:  [facility];  Service: Gastroenterology;  Laterality: N/A;  COLITIS; DIARRHEA  POST:HEMORRHOIDS; DIVERTICULOSIS; SIGMOID MASS; COLITIS; COLON POLYP    EYE SURGERY  2019    Cataracts    FRACTURE SURGERY  2010    Broken wrist    HYSTERECTOMY      Partial- 30 plus years ago    SIGMOIDOSCOPY N/A 7/3/2025    Procedure: SIGMOIDOSCOPY FLEXIBLE;  Surgeon: Marcos Rowe MD;  Location:  [facility];  Service: General;  Laterality: N/A;  HX OF COLON CANCER  POST:NORMAL       Social History:   reports that she has never smoked. She has never been exposed to tobacco smoke. She has never used smokeless tobacco. She reports that she does not currently use alcohol. She reports that she does not use drugs.      Marriage status:     Family History   Problem Relation Age of Onset    Heart disease Mother     Cancer Father     Hyperlipidemia Sister     Kidney disease Sister     Malig Hyperthermia Neg Hx          Current Outpatient Medications:     acetaminophen (TYLENOL) 500 MG tablet, Take 2 tablets by mouth Every 6 (Six) Hours., Disp: , Rfl:     amLODIPine (NORVASC) 5 MG tablet, Take 1 tablet by mouth Daily., Disp: 90 tablet, Rfl: 1    ASHWAGANDHA PO, Take 300 mg by mouth Daily. HOLDING FOR SURGERY, Disp: , Rfl:     B Complex Vitamins (vitamin b complex) capsule capsule, Take 1 capsule by mouth Daily. CURRENTLY NOT TAKING, Disp: , Rfl:     esomeprazole (nexIUM) 40 MG capsule, Take 1 capsule by mouth Every Morning Before Breakfast., Disp: 90 capsule, Rfl: 1    gabapentin (NEURONTIN) 300 MG capsule, Take 1 capsule by mouth Every 12  (Twelve) Hours for 10 days. (Patient taking differently: Take 1 capsule by mouth As Needed.), Disp: 20 capsule, Rfl: 0    Plant Sterols and Stanols (CHOLESTOFF PO), Take 1 tablet by mouth Daily. CURRENTLY NOT TAKING, Disp: , Rfl:     travoprost, BAK free, (TRAVATAN) 0.004 % solution ophthalmic solution, Administer 0.004 drops to both eyes Every Night., Disp: , Rfl:     trimethoprim (TRIMPEX) 100 MG tablet, TAKE 1 TABLET BY MOUTH DAILY. (Patient taking differently: Take 1 tablet by mouth Daily As Needed. ONLY TAKES IF HAS UTI), Disp: 30 tablet, Rfl: 6    Allergy  Latex, Statins, Morphine, and Sulfa antibiotics      Vitals:    07/07/25 0808   BP: 110/68   Pulse: 75   SpO2: 99%     Body mass index is 25.09 kg/m².      Physical Exam    Physical Exam  Constitutional:       General: She is not in acute distress.     Appearance: She is well-developed.   HENT:      Head: Normocephalic and atraumatic.   Abdominal:      General: There is no distension.      Palpations: Abdomen is soft.      Comments: incisions have healed well. Ostomy is pink and productive.   Musculoskeletal:         General: Normal range of motion.   Neurological:      Mental Status: She is alert.   Psychiatric:         Thought Content: Thought content normal.         Review of Medical Record:  I reviewed medical records as detailed in HPI.     Assessment & Plan  1. Postoperative status following low anterior resection with diverting loop ileostomy.  She is scheduled for ileostomy take down surgery tomorrow. The procedure will involve detaching the ileostomy from the abdominal wall, closing it, and reinserting it into the abdominal cavity. The muscle layer will be closed, but the skin will remain open due to the risk of infection from fecal matter. A gauze or wound VAC will be applied to aid in healing. The wound VAC is not mandatory but can expedite healing by 2 to 3 times compared to self-care. It is recommended to try the wound VAC during her hospital  stay to assess comfort and effectiveness. The wound VAC typically requires 2 to 3 weeks of use, whereas gauze changes may take several months. Home health visits will be scheduled three times a week if the wound VAC is used. Initially, bowel movements may be liquid, leading to potential accidents in the first few days. A Depend brief or similar product is suggested for this period. Once a regular diet is resumed, bowel movements should solidify, providing more control. The total healing time for the incision varies depending on whether the wound VAC is used, ranging from a couple of months without it to less than a month with it. Home health visits will continue as long as the wound VAC is in use. If not used, she will be taught how to change the dressing within a week and then manage it independently.     1. Rectal cancer    upper      Patient or patient representative verbalized consent for the use of Ambient Listening during the visit with  Marcos Rowe MD for chart documentation. 7/7/2025  10:40 EDT

## 2025-07-07 NOTE — H&P (VIEW-ONLY)
Jeanine Guzman is a 70 y.o. female who is seen as a consult at the request of No ref. provider found for Post-op.      HPI:  History of Present Illness  The patient is a 70-year-old female who underwent a low anterior resection with diverting loop ileostomy for a T3 N0 upper rectal cancer. She had a Gastrografin enema that showed no evidence of leak, but there was an outpouching which was interpreted as how the anastomosis is side-to-end. A flexible sigmoidoscopy on Friday did not show any abnormality, just a side-to-end anastomosis.    She reports feeling well overall. She expresses concern about the necessity of a wound VAC and its potential impact on her daily activities, such as showering. She also questions the duration of its use and whether it would be more beneficial to change the dressing daily. Additionally, she inquires about the expected healing time post-surgery and the duration of home health care. She recalls experiencing bowel movements several times after her endoscopy and occasionally feels the urge to defecate, although nothing is passed. She has been consuming chicken broth and wonders if this could increase her risk of infection.       Past Medical History:   Diagnosis Date    Anxiety     Arthritis     HANDS    Colon cancer 04/2025    Colon polyp 11/11/2022    Diverticulosis 2010    GERD (gastroesophageal reflux disease) 2016    GI (gastrointestinal bleed)     Glaucoma 2018    Headache     History of chest pain 04/2025    CARDIAC EVAL NEGATIVE    History of Clostridium difficile infection     History of COVID-19     History of kidney stones     History of migraine     History of UTI     Hyperlipidemia October 2022    Hypertension     Inflammatory bowel disease 04/11/25    Ischemic colitis    PONV (postoperative nausea and vomiting) 1990    Rectal bleeding 2024       Past Surgical History:   Procedure Laterality Date    APPENDECTOMY  1958    COLON RESECTION N/A 05/13/2025    Procedure: COLON  RESECTION LOW ANTERIOR LAPAROSCOPIC WITH DAVINCI ROBOT AND DIVERTING LOOP ILEOSTOMY;  Surgeon: Marcos Rowe MD;  Location: [facility] MAIN OR;  Service: Robotics - DaVinci;  Laterality: N/A;    COLONOSCOPY  11/11/22    COLONOSCOPY N/A 04/11/2025    Procedure: COLONOSCOPY TO CECUM WITH BIOPSY AND SPOT INK MARKER; POLYPECTOMY ( COLD SNARE);  Surgeon: Juan Jose Horner MD;  Location:  [facility];  Service: Gastroenterology;  Laterality: N/A;  COLITIS; DIARRHEA  POST:HEMORRHOIDS; DIVERTICULOSIS; SIGMOID MASS; COLITIS; COLON POLYP    EYE SURGERY  2019    Cataracts    FRACTURE SURGERY  2010    Broken wrist    HYSTERECTOMY      Partial- 30 plus years ago    SIGMOIDOSCOPY N/A 7/3/2025    Procedure: SIGMOIDOSCOPY FLEXIBLE;  Surgeon: Marcos Rowe MD;  Location:  [facility];  Service: General;  Laterality: N/A;  HX OF COLON CANCER  POST:NORMAL       Social History:   reports that she has never smoked. She has never been exposed to tobacco smoke. She has never used smokeless tobacco. She reports that she does not currently use alcohol. She reports that she does not use drugs.      Marriage status:     Family History   Problem Relation Age of Onset    Heart disease Mother     Cancer Father     Hyperlipidemia Sister     Kidney disease Sister     Malig Hyperthermia Neg Hx          Current Outpatient Medications:     acetaminophen (TYLENOL) 500 MG tablet, Take 2 tablets by mouth Every 6 (Six) Hours., Disp: , Rfl:     amLODIPine (NORVASC) 5 MG tablet, Take 1 tablet by mouth Daily., Disp: 90 tablet, Rfl: 1    ASHWAGANDHA PO, Take 300 mg by mouth Daily. HOLDING FOR SURGERY, Disp: , Rfl:     B Complex Vitamins (vitamin b complex) capsule capsule, Take 1 capsule by mouth Daily. CURRENTLY NOT TAKING, Disp: , Rfl:     esomeprazole (nexIUM) 40 MG capsule, Take 1 capsule by mouth Every Morning Before Breakfast., Disp: 90 capsule, Rfl: 1    gabapentin (NEURONTIN) 300 MG capsule, Take 1 capsule by mouth Every 12  (Twelve) Hours for 10 days. (Patient taking differently: Take 1 capsule by mouth As Needed.), Disp: 20 capsule, Rfl: 0    Plant Sterols and Stanols (CHOLESTOFF PO), Take 1 tablet by mouth Daily. CURRENTLY NOT TAKING, Disp: , Rfl:     travoprost, BAK free, (TRAVATAN) 0.004 % solution ophthalmic solution, Administer 0.004 drops to both eyes Every Night., Disp: , Rfl:     trimethoprim (TRIMPEX) 100 MG tablet, TAKE 1 TABLET BY MOUTH DAILY. (Patient taking differently: Take 1 tablet by mouth Daily As Needed. ONLY TAKES IF HAS UTI), Disp: 30 tablet, Rfl: 6    Allergy  Latex, Statins, Morphine, and Sulfa antibiotics      Vitals:    07/07/25 0808   BP: 110/68   Pulse: 75   SpO2: 99%     Body mass index is 25.09 kg/m².      Physical Exam    Physical Exam  Constitutional:       General: She is not in acute distress.     Appearance: She is well-developed.   HENT:      Head: Normocephalic and atraumatic.   Abdominal:      General: There is no distension.      Palpations: Abdomen is soft.      Comments: incisions have healed well. Ostomy is pink and productive.   Musculoskeletal:         General: Normal range of motion.   Neurological:      Mental Status: She is alert.   Psychiatric:         Thought Content: Thought content normal.         Review of Medical Record:  I reviewed medical records as detailed in HPI.     Assessment & Plan  1. Postoperative status following low anterior resection with diverting loop ileostomy.  She is scheduled for ileostomy take down surgery tomorrow. The procedure will involve detaching the ileostomy from the abdominal wall, closing it, and reinserting it into the abdominal cavity. The muscle layer will be closed, but the skin will remain open due to the risk of infection from fecal matter. A gauze or wound VAC will be applied to aid in healing. The wound VAC is not mandatory but can expedite healing by 2 to 3 times compared to self-care. It is recommended to try the wound VAC during her hospital  stay to assess comfort and effectiveness. The wound VAC typically requires 2 to 3 weeks of use, whereas gauze changes may take several months. Home health visits will be scheduled three times a week if the wound VAC is used. Initially, bowel movements may be liquid, leading to potential accidents in the first few days. A Depend brief or similar product is suggested for this period. Once a regular diet is resumed, bowel movements should solidify, providing more control. The total healing time for the incision varies depending on whether the wound VAC is used, ranging from a couple of months without it to less than a month with it. Home health visits will continue as long as the wound VAC is in use. If not used, she will be taught how to change the dressing within a week and then manage it independently.     1. Rectal cancer    upper      Patient or patient representative verbalized consent for the use of Ambient Listening during the visit with  Marcos Rowe MD for chart documentation. 7/7/2025  10:40 EDT

## 2025-07-08 ENCOUNTER — ANESTHESIA (OUTPATIENT)
Dept: PERIOP | Facility: HOSPITAL | Age: 70
End: 2025-07-08
Payer: MEDICARE

## 2025-07-08 ENCOUNTER — HOSPITAL ENCOUNTER (INPATIENT)
Facility: HOSPITAL | Age: 70
LOS: 2 days | Discharge: HOME HEALTH | End: 2025-07-10
Attending: COLON & RECTAL SURGERY | Admitting: COLON & RECTAL SURGERY
Payer: MEDICARE

## 2025-07-08 ENCOUNTER — ANESTHESIA EVENT (OUTPATIENT)
Dept: PERIOP | Facility: HOSPITAL | Age: 70
End: 2025-07-08
Payer: MEDICARE

## 2025-07-08 DIAGNOSIS — C20 RECTAL CANCER: Primary | ICD-10-CM

## 2025-07-08 DIAGNOSIS — C18.7 MALIGNANT NEOPLASM OF SIGMOID COLON: ICD-10-CM

## 2025-07-08 PROCEDURE — 25810000003 SODIUM CHLORIDE 0.9 % SOLUTION: Performed by: COLON & RECTAL SURGERY

## 2025-07-08 PROCEDURE — 25010000002 CEFAZOLIN PER 500 MG: Performed by: COLON & RECTAL SURGERY

## 2025-07-08 PROCEDURE — 88304 TISSUE EXAM BY PATHOLOGIST: CPT | Performed by: COLON & RECTAL SURGERY

## 2025-07-08 PROCEDURE — 25010000002 BUPIVACAINE (PF) 0.25 % SOLUTION: Performed by: ANESTHESIOLOGY

## 2025-07-08 PROCEDURE — 25010000002 ONDANSETRON PER 1 MG: Performed by: NURSE ANESTHETIST, CERTIFIED REGISTERED

## 2025-07-08 PROCEDURE — 44625 REPAIR BOWEL OPENING: CPT | Performed by: PHYSICIAN ASSISTANT

## 2025-07-08 PROCEDURE — 0DBB0ZZ EXCISION OF ILEUM, OPEN APPROACH: ICD-10-PCS | Performed by: COLON & RECTAL SURGERY

## 2025-07-08 PROCEDURE — 25010000002 PROPOFOL 10 MG/ML EMULSION: Performed by: NURSE ANESTHETIST, CERTIFIED REGISTERED

## 2025-07-08 PROCEDURE — 25010000002 DEXAMETHASONE SODIUM PHOSPHATE 20 MG/5ML SOLUTION: Performed by: NURSE ANESTHETIST, CERTIFIED REGISTERED

## 2025-07-08 PROCEDURE — 25010000002 BUPIVACAINE LIPOSOME 1.3 % SUSPENSION: Performed by: ANESTHESIOLOGY

## 2025-07-08 PROCEDURE — 25010000002 SUGAMMADEX 200 MG/2ML SOLUTION: Performed by: NURSE ANESTHETIST, CERTIFIED REGISTERED

## 2025-07-08 PROCEDURE — 44625 REPAIR BOWEL OPENING: CPT | Performed by: COLON & RECTAL SURGERY

## 2025-07-08 PROCEDURE — 25010000002 LIDOCAINE 2% SOLUTION: Performed by: NURSE ANESTHETIST, CERTIFIED REGISTERED

## 2025-07-08 PROCEDURE — 25810000003 LACTATED RINGERS PER 1000 ML: Performed by: ANESTHESIOLOGY

## 2025-07-08 PROCEDURE — 25010000002 METRONIDAZOLE 500 MG/100ML SOLUTION: Performed by: COLON & RECTAL SURGERY

## 2025-07-08 PROCEDURE — 25010000002 FENTANYL CITRATE (PF) 50 MCG/ML SOLUTION: Performed by: NURSE ANESTHETIST, CERTIFIED REGISTERED

## 2025-07-08 DEVICE — PROXIMATE RELOADABLE LINEAR CUTTER WITH SAFETY LOCK-OUT, 75MM
Type: IMPLANTABLE DEVICE | Site: ILEUM | Status: FUNCTIONAL
Brand: PROXIMATE

## 2025-07-08 DEVICE — PROXIMATE LINEAR CUTTER RELOAD, BLUE, 75MM
Type: IMPLANTABLE DEVICE | Site: ILEUM | Status: FUNCTIONAL
Brand: PROXIMATE

## 2025-07-08 DEVICE — PROXIMATE RELOADABLE LINEAR STAPLER
Type: IMPLANTABLE DEVICE | Site: ILEUM | Status: FUNCTIONAL
Brand: PROXIMATE

## 2025-07-08 RX ORDER — GABAPENTIN 100 MG/1
200 CAPSULE ORAL ONCE
Status: COMPLETED | OUTPATIENT
Start: 2025-07-08 | End: 2025-07-08

## 2025-07-08 RX ORDER — LIDOCAINE HYDROCHLORIDE 20 MG/ML
INJECTION, SOLUTION INFILTRATION; PERINEURAL AS NEEDED
Status: DISCONTINUED | OUTPATIENT
Start: 2025-07-08 | End: 2025-07-08 | Stop reason: SURG

## 2025-07-08 RX ORDER — SODIUM CHLORIDE 0.9 % (FLUSH) 0.9 %
3-10 SYRINGE (ML) INJECTION AS NEEDED
Status: DISCONTINUED | OUTPATIENT
Start: 2025-07-08 | End: 2025-07-08 | Stop reason: HOSPADM

## 2025-07-08 RX ORDER — NITROGLYCERIN 0.4 MG/1
0.4 TABLET SUBLINGUAL
Status: DISCONTINUED | OUTPATIENT
Start: 2025-07-08 | End: 2025-07-10 | Stop reason: HOSPADM

## 2025-07-08 RX ORDER — ONDANSETRON 2 MG/ML
INJECTION INTRAMUSCULAR; INTRAVENOUS AS NEEDED
Status: DISCONTINUED | OUTPATIENT
Start: 2025-07-08 | End: 2025-07-08 | Stop reason: SURG

## 2025-07-08 RX ORDER — ALVIMOPAN 12 MG/1
12 CAPSULE ORAL ONCE
Status: COMPLETED | OUTPATIENT
Start: 2025-07-08 | End: 2025-07-08

## 2025-07-08 RX ORDER — LABETALOL HYDROCHLORIDE 5 MG/ML
5 INJECTION, SOLUTION INTRAVENOUS
Status: DISCONTINUED | OUTPATIENT
Start: 2025-07-08 | End: 2025-07-08 | Stop reason: HOSPADM

## 2025-07-08 RX ORDER — PANTOPRAZOLE SODIUM 40 MG/1
40 TABLET, DELAYED RELEASE ORAL
Status: DISCONTINUED | OUTPATIENT
Start: 2025-07-09 | End: 2025-07-10 | Stop reason: HOSPADM

## 2025-07-08 RX ORDER — HYDROCODONE BITARTRATE AND ACETAMINOPHEN 7.5; 325 MG/1; MG/1
1 TABLET ORAL EVERY 4 HOURS PRN
Status: DISCONTINUED | OUTPATIENT
Start: 2025-07-08 | End: 2025-07-08 | Stop reason: HOSPADM

## 2025-07-08 RX ORDER — EPHEDRINE SULFATE 50 MG/ML
5 INJECTION, SOLUTION INTRAVENOUS ONCE AS NEEDED
Status: DISCONTINUED | OUTPATIENT
Start: 2025-07-08 | End: 2025-07-08 | Stop reason: HOSPADM

## 2025-07-08 RX ORDER — GABAPENTIN 300 MG/1
300 CAPSULE ORAL 2 TIMES DAILY
Status: DISCONTINUED | OUTPATIENT
Start: 2025-07-08 | End: 2025-07-10 | Stop reason: HOSPADM

## 2025-07-08 RX ORDER — FENTANYL CITRATE 50 UG/ML
25 INJECTION, SOLUTION INTRAMUSCULAR; INTRAVENOUS
Status: DISCONTINUED | OUTPATIENT
Start: 2025-07-08 | End: 2025-07-08 | Stop reason: HOSPADM

## 2025-07-08 RX ORDER — FENTANYL CITRATE 50 UG/ML
INJECTION, SOLUTION INTRAMUSCULAR; INTRAVENOUS AS NEEDED
Status: DISCONTINUED | OUTPATIENT
Start: 2025-07-08 | End: 2025-07-08 | Stop reason: SURG

## 2025-07-08 RX ORDER — CELECOXIB 200 MG/1
200 CAPSULE ORAL EVERY 12 HOURS SCHEDULED
Status: DISCONTINUED | OUTPATIENT
Start: 2025-07-08 | End: 2025-07-10 | Stop reason: HOSPADM

## 2025-07-08 RX ORDER — HYDROMORPHONE HYDROCHLORIDE 1 MG/ML
0.25 INJECTION, SOLUTION INTRAMUSCULAR; INTRAVENOUS; SUBCUTANEOUS
Status: DISCONTINUED | OUTPATIENT
Start: 2025-07-08 | End: 2025-07-10 | Stop reason: HOSPADM

## 2025-07-08 RX ORDER — SODIUM CHLORIDE, SODIUM LACTATE, POTASSIUM CHLORIDE, CALCIUM CHLORIDE 600; 310; 30; 20 MG/100ML; MG/100ML; MG/100ML; MG/100ML
9 INJECTION, SOLUTION INTRAVENOUS CONTINUOUS
Status: DISCONTINUED | OUTPATIENT
Start: 2025-07-08 | End: 2025-07-08

## 2025-07-08 RX ORDER — EPHEDRINE SULFATE 50 MG/ML
INJECTION, SOLUTION INTRAVENOUS AS NEEDED
Status: DISCONTINUED | OUTPATIENT
Start: 2025-07-08 | End: 2025-07-08 | Stop reason: SURG

## 2025-07-08 RX ORDER — ONDANSETRON 2 MG/ML
4 INJECTION INTRAMUSCULAR; INTRAVENOUS ONCE AS NEEDED
Status: COMPLETED | OUTPATIENT
Start: 2025-07-08 | End: 2025-07-08

## 2025-07-08 RX ORDER — NALOXONE HCL 0.4 MG/ML
0.4 VIAL (ML) INJECTION
Status: DISCONTINUED | OUTPATIENT
Start: 2025-07-08 | End: 2025-07-10 | Stop reason: HOSPADM

## 2025-07-08 RX ORDER — DROPERIDOL 2.5 MG/ML
0.62 INJECTION, SOLUTION INTRAMUSCULAR; INTRAVENOUS
Status: DISCONTINUED | OUTPATIENT
Start: 2025-07-08 | End: 2025-07-08 | Stop reason: HOSPADM

## 2025-07-08 RX ORDER — HYDROMORPHONE HYDROCHLORIDE 1 MG/ML
0.25 INJECTION, SOLUTION INTRAMUSCULAR; INTRAVENOUS; SUBCUTANEOUS
Status: DISCONTINUED | OUTPATIENT
Start: 2025-07-08 | End: 2025-07-08 | Stop reason: HOSPADM

## 2025-07-08 RX ORDER — SCOPOLAMINE 1 MG/3D
1 PATCH, EXTENDED RELEASE TRANSDERMAL CONTINUOUS
Status: DISCONTINUED | OUTPATIENT
Start: 2025-07-08 | End: 2025-07-10 | Stop reason: HOSPADM

## 2025-07-08 RX ORDER — FAMOTIDINE 10 MG/ML
20 INJECTION, SOLUTION INTRAVENOUS ONCE
Status: DISCONTINUED | OUTPATIENT
Start: 2025-07-08 | End: 2025-07-08 | Stop reason: HOSPADM

## 2025-07-08 RX ORDER — METRONIDAZOLE 500 MG/100ML
500 INJECTION, SOLUTION INTRAVENOUS ONCE
Status: COMPLETED | OUTPATIENT
Start: 2025-07-08 | End: 2025-07-08

## 2025-07-08 RX ORDER — ONDANSETRON 2 MG/ML
4 INJECTION INTRAMUSCULAR; INTRAVENOUS EVERY 6 HOURS PRN
Status: DISCONTINUED | OUTPATIENT
Start: 2025-07-08 | End: 2025-07-10 | Stop reason: HOSPADM

## 2025-07-08 RX ORDER — ATROPINE SULFATE 0.4 MG/ML
0.4 INJECTION, SOLUTION INTRAMUSCULAR; INTRAVENOUS; SUBCUTANEOUS ONCE AS NEEDED
Status: DISCONTINUED | OUTPATIENT
Start: 2025-07-08 | End: 2025-07-08 | Stop reason: HOSPADM

## 2025-07-08 RX ORDER — MIDAZOLAM HYDROCHLORIDE 1 MG/ML
0.5 INJECTION, SOLUTION INTRAMUSCULAR; INTRAVENOUS
Status: DISCONTINUED | OUTPATIENT
Start: 2025-07-08 | End: 2025-07-08 | Stop reason: HOSPADM

## 2025-07-08 RX ORDER — FLUMAZENIL 0.1 MG/ML
0.2 INJECTION INTRAVENOUS AS NEEDED
Status: DISCONTINUED | OUTPATIENT
Start: 2025-07-08 | End: 2025-07-08 | Stop reason: HOSPADM

## 2025-07-08 RX ORDER — ALVIMOPAN 12 MG/1
12 CAPSULE ORAL 2 TIMES DAILY
Status: DISCONTINUED | OUTPATIENT
Start: 2025-07-09 | End: 2025-07-10 | Stop reason: HOSPADM

## 2025-07-08 RX ORDER — PROPOFOL 10 MG/ML
VIAL (ML) INTRAVENOUS AS NEEDED
Status: DISCONTINUED | OUTPATIENT
Start: 2025-07-08 | End: 2025-07-08 | Stop reason: SURG

## 2025-07-08 RX ORDER — BUPIVACAINE HYDROCHLORIDE 2.5 MG/ML
INJECTION, SOLUTION EPIDURAL; INFILTRATION; INTRACAUDAL; PERINEURAL
Status: COMPLETED | OUTPATIENT
Start: 2025-07-08 | End: 2025-07-08

## 2025-07-08 RX ORDER — LIDOCAINE HYDROCHLORIDE 10 MG/ML
0.5 INJECTION, SOLUTION INFILTRATION; PERINEURAL ONCE AS NEEDED
Status: DISCONTINUED | OUTPATIENT
Start: 2025-07-08 | End: 2025-07-08 | Stop reason: HOSPADM

## 2025-07-08 RX ORDER — ACETAMINOPHEN 500 MG
1000 TABLET ORAL ONCE
Status: COMPLETED | OUTPATIENT
Start: 2025-07-08 | End: 2025-07-08

## 2025-07-08 RX ORDER — PROMETHAZINE HYDROCHLORIDE 25 MG/1
25 TABLET ORAL ONCE AS NEEDED
Status: DISCONTINUED | OUTPATIENT
Start: 2025-07-08 | End: 2025-07-08 | Stop reason: HOSPADM

## 2025-07-08 RX ORDER — PROMETHAZINE HYDROCHLORIDE 25 MG/1
25 SUPPOSITORY RECTAL ONCE AS NEEDED
Status: DISCONTINUED | OUTPATIENT
Start: 2025-07-08 | End: 2025-07-08 | Stop reason: HOSPADM

## 2025-07-08 RX ORDER — IPRATROPIUM BROMIDE AND ALBUTEROL SULFATE 2.5; .5 MG/3ML; MG/3ML
3 SOLUTION RESPIRATORY (INHALATION) ONCE AS NEEDED
Status: DISCONTINUED | OUTPATIENT
Start: 2025-07-08 | End: 2025-07-08 | Stop reason: HOSPADM

## 2025-07-08 RX ORDER — MAGNESIUM HYDROXIDE 1200 MG/15ML
LIQUID ORAL AS NEEDED
Status: DISCONTINUED | OUTPATIENT
Start: 2025-07-08 | End: 2025-07-08 | Stop reason: HOSPADM

## 2025-07-08 RX ORDER — SODIUM CHLORIDE 0.9 % (FLUSH) 0.9 %
3 SYRINGE (ML) INJECTION EVERY 12 HOURS SCHEDULED
Status: DISCONTINUED | OUTPATIENT
Start: 2025-07-08 | End: 2025-07-08 | Stop reason: HOSPADM

## 2025-07-08 RX ORDER — DEXAMETHASONE SODIUM PHOSPHATE 4 MG/ML
INJECTION, SOLUTION INTRA-ARTICULAR; INTRALESIONAL; INTRAMUSCULAR; INTRAVENOUS; SOFT TISSUE AS NEEDED
Status: DISCONTINUED | OUTPATIENT
Start: 2025-07-08 | End: 2025-07-08 | Stop reason: SURG

## 2025-07-08 RX ORDER — FENTANYL CITRATE 50 UG/ML
50 INJECTION, SOLUTION INTRAMUSCULAR; INTRAVENOUS ONCE AS NEEDED
Status: DISCONTINUED | OUTPATIENT
Start: 2025-07-08 | End: 2025-07-08 | Stop reason: HOSPADM

## 2025-07-08 RX ORDER — ACETAMINOPHEN 10 MG/ML
1000 INJECTION, SOLUTION INTRAVENOUS ONCE
Status: DISCONTINUED | OUTPATIENT
Start: 2025-07-08 | End: 2025-07-10 | Stop reason: HOSPADM

## 2025-07-08 RX ORDER — CELECOXIB 200 MG/1
200 CAPSULE ORAL ONCE
Status: COMPLETED | OUTPATIENT
Start: 2025-07-08 | End: 2025-07-08

## 2025-07-08 RX ORDER — ACETAMINOPHEN 500 MG
1000 TABLET ORAL EVERY 6 HOURS
Status: DISCONTINUED | OUTPATIENT
Start: 2025-07-08 | End: 2025-07-10 | Stop reason: HOSPADM

## 2025-07-08 RX ORDER — DIPHENHYDRAMINE HYDROCHLORIDE 50 MG/ML
12.5 INJECTION, SOLUTION INTRAMUSCULAR; INTRAVENOUS
Status: DISCONTINUED | OUTPATIENT
Start: 2025-07-08 | End: 2025-07-08 | Stop reason: HOSPADM

## 2025-07-08 RX ORDER — BUPIVACAINE HYDROCHLORIDE AND EPINEPHRINE 2.5; 5 MG/ML; UG/ML
INJECTION, SOLUTION EPIDURAL; INFILTRATION; INTRACAUDAL; PERINEURAL AS NEEDED
Status: DISCONTINUED | OUTPATIENT
Start: 2025-07-08 | End: 2025-07-08 | Stop reason: HOSPADM

## 2025-07-08 RX ORDER — ROCURONIUM BROMIDE 10 MG/ML
INJECTION, SOLUTION INTRAVENOUS AS NEEDED
Status: DISCONTINUED | OUTPATIENT
Start: 2025-07-08 | End: 2025-07-08 | Stop reason: SURG

## 2025-07-08 RX ORDER — HYDROCODONE BITARTRATE AND ACETAMINOPHEN 5; 325 MG/1; MG/1
1 TABLET ORAL ONCE AS NEEDED
Status: DISCONTINUED | OUTPATIENT
Start: 2025-07-08 | End: 2025-07-08 | Stop reason: HOSPADM

## 2025-07-08 RX ORDER — LORAZEPAM 0.5 MG/1
0.5 TABLET ORAL EVERY 8 HOURS PRN
Status: DISCONTINUED | OUTPATIENT
Start: 2025-07-08 | End: 2025-07-10 | Stop reason: HOSPADM

## 2025-07-08 RX ORDER — NALOXONE HCL 0.4 MG/ML
0.2 VIAL (ML) INJECTION AS NEEDED
Status: DISCONTINUED | OUTPATIENT
Start: 2025-07-08 | End: 2025-07-08 | Stop reason: HOSPADM

## 2025-07-08 RX ORDER — SODIUM CHLORIDE 9 MG/ML
50 INJECTION, SOLUTION INTRAVENOUS CONTINUOUS
Status: ACTIVE | OUTPATIENT
Start: 2025-07-08 | End: 2025-07-09

## 2025-07-08 RX ADMIN — PROPOFOL 50 MCG/KG/MIN: 10 INJECTION, EMULSION INTRAVENOUS at 09:34

## 2025-07-08 RX ADMIN — EPHEDRINE SULFATE 5 MG: 50 INJECTION INTRAVENOUS at 09:39

## 2025-07-08 RX ADMIN — BUPIVACAINE 20 ML: 13.3 INJECTION, SUSPENSION, LIPOSOMAL INFILTRATION at 09:43

## 2025-07-08 RX ADMIN — ACETAMINOPHEN 1000 MG: 500 TABLET, FILM COATED ORAL at 18:38

## 2025-07-08 RX ADMIN — ONDANSETRON 4 MG: 2 INJECTION, SOLUTION INTRAMUSCULAR; INTRAVENOUS at 10:25

## 2025-07-08 RX ADMIN — GABAPENTIN 300 MG: 300 CAPSULE ORAL at 21:34

## 2025-07-08 RX ADMIN — CELECOXIB 200 MG: 200 CAPSULE ORAL at 08:03

## 2025-07-08 RX ADMIN — SCOPOLAMINE 1 PATCH: 1.5 PATCH, EXTENDED RELEASE TRANSDERMAL at 08:05

## 2025-07-08 RX ADMIN — PROPOFOL 100 MG: 10 INJECTION, EMULSION INTRAVENOUS at 09:30

## 2025-07-08 RX ADMIN — DEXAMETHASONE SODIUM PHOSPHATE 6 MG: 4 INJECTION, SOLUTION INTRAMUSCULAR; INTRAVENOUS at 09:35

## 2025-07-08 RX ADMIN — BUPIVACAINE HYDROCHLORIDE 20 ML: 2.5 INJECTION, SOLUTION EPIDURAL; INFILTRATION; INTRACAUDAL; PERINEURAL at 09:43

## 2025-07-08 RX ADMIN — SODIUM CHLORIDE, POTASSIUM CHLORIDE, SODIUM LACTATE AND CALCIUM CHLORIDE 9 ML/HR: 600; 310; 30; 20 INJECTION, SOLUTION INTRAVENOUS at 08:12

## 2025-07-08 RX ADMIN — SODIUM CHLORIDE 50 ML/HR: 9 INJECTION, SOLUTION INTRAVENOUS at 16:02

## 2025-07-08 RX ADMIN — ONDANSETRON 4 MG: 2 INJECTION, SOLUTION INTRAMUSCULAR; INTRAVENOUS at 11:27

## 2025-07-08 RX ADMIN — ALVIMOPAN 12 MG: 12 CAPSULE ORAL at 08:04

## 2025-07-08 RX ADMIN — FENTANYL CITRATE 100 MCG: 50 INJECTION, SOLUTION INTRAMUSCULAR; INTRAVENOUS at 09:30

## 2025-07-08 RX ADMIN — LIDOCAINE HYDROCHLORIDE 60 MG: 20 INJECTION, SOLUTION INFILTRATION; PERINEURAL at 09:30

## 2025-07-08 RX ADMIN — SUGAMMADEX 200 MG: 100 INJECTION, SOLUTION INTRAVENOUS at 10:46

## 2025-07-08 RX ADMIN — CEFAZOLIN 2000 MG: 2 INJECTION, POWDER, FOR SOLUTION INTRAMUSCULAR; INTRAVENOUS at 09:19

## 2025-07-08 RX ADMIN — CELECOXIB 200 MG: 200 CAPSULE ORAL at 21:33

## 2025-07-08 RX ADMIN — EPHEDRINE SULFATE 10 MG: 50 INJECTION INTRAVENOUS at 09:36

## 2025-07-08 RX ADMIN — GABAPENTIN 200 MG: 100 CAPSULE ORAL at 08:04

## 2025-07-08 RX ADMIN — METRONIDAZOLE 500 MG: 500 INJECTION, SOLUTION INTRAVENOUS at 09:19

## 2025-07-08 RX ADMIN — ACETAMINOPHEN 1000 MG: 500 TABLET, FILM COATED ORAL at 08:04

## 2025-07-08 RX ADMIN — ROCURONIUM BROMIDE 50 MG: 10 INJECTION, SOLUTION INTRAVENOUS at 09:30

## 2025-07-08 NOTE — PLAN OF CARE
Goal Outcome Evaluation:  Plan of Care Reviewed With: (P) patient        Progress: (P) improving  Outcome Evaluation: (P) VSS. Transfered from PACU. Wound vac on right side of abdomen. IV fluids started. Pt is on full liquid diet. Pt is very lethargic and tired. Meplex was placed on pt bottom, red blanchable. A/O x4.

## 2025-07-08 NOTE — ANESTHESIA PROCEDURE NOTES
Airway  Reason: elective    Date/Time: 7/8/2025 9:32 AM  Airway not difficult    General Information and Staff    Patient location during procedure: OR  Anesthesiologist: Mayito Burgess MD  CRNA/CAA: Karen Romero CRNA    Indications and Patient Condition  Indications for airway management: airway protection    Preoxygenated: yes  MILS not maintained throughout    Mask difficulty assessment: 1 - vent by mask    Final Airway Details    Final airway type: endotracheal airway      Successful airway: ETT  Cuffed: yes   Successful intubation technique: direct laryngoscopy  Adjuncts used in placement: intubating stylet  Endotracheal tube insertion site: oral  Blade: Cedric  Blade size: 3  ETT size (mm): 7.0  Cormack-Lehane Classification: grade I - full view of glottis  Placement verified by: chest auscultation   Cuff volume (mL): 8  Measured from: lips  Number of attempts at approach: 1  Assessment: lips, teeth, and gum same as pre-op and atraumatic intubation    Additional Comments  PreO2 100% face mask, IV induction, easy mask, DVL x1, cords noted, tube through, cuff up, EBBSH, +etCO2, = chest movement, tube secured in place, atraumatic, teeth and lips intact as preop.

## 2025-07-08 NOTE — ANESTHESIA POSTPROCEDURE EVALUATION
"Patient: Jeanine Guzman    Procedure Summary       Date: 07/08/25 Room / Location: [facility] OR [address] [place] [address] MAIN OR    Anesthesia Start: 0923 Anesthesia Stop: 1110    Procedure: ILEOSTOMY TAKEDOWN, WOUND VAC PLACEMENT Diagnosis:       Malignant neoplasm of sigmoid colon      (Malignant neoplasm of sigmoid colon [C18.7])    Surgeons: Marcos Rowe MD Provider: Mayito Burgess MD    Anesthesia Type: general ASA Status: 3            Anesthesia Type: general    Vitals  Vitals Value Taken Time   /54 07/08/25 14:15   Temp 36.5 °C (97.7 °F) 07/08/25 11:03   Pulse 64 07/08/25 14:46   Resp 20 07/08/25 14:15   SpO2 99 % 07/08/25 14:46   Vitals shown include unfiled device data.        Post Anesthesia Care and Evaluation    Patient location during evaluation: bedside  Patient participation: complete - patient participated  Level of consciousness: awake and alert  Pain management: adequate    Airway patency: patent  Anesthetic complications: No anesthetic complications    Cardiovascular status: acceptable  Respiratory status: acceptable  Hydration status: acceptable    Comments: /54   Pulse 63   Temp 36.5 °C (97.7 °F) (Oral)   Resp 20   Ht 154.9 cm (61\")   Wt 61.2 kg (135 lb)   LMP  (LMP Unknown)   SpO2 100%   BMI 25.51 kg/m²     "

## 2025-07-08 NOTE — ANESTHESIA PROCEDURE NOTES
Peripheral Block      Patient reassessed immediately prior to procedure    Patient location during procedure: holding area  Reason for block: at surgeon's request and post-op pain management  Performed by  Anesthesiologist: Mayito Burgess MD  Preanesthetic Checklist  Completed: patient identified, IV checked, site marked, risks and benefits discussed, surgical consent, monitors and equipment checked, pre-op evaluation and timeout performed  Prep:  Pt Position: supine  Sterile barriers:cap, gloves, mask and sterile barriers  Prep: ChloraPrep  Patient monitoring: blood pressure monitoring, continuous pulse oximetry and EKG  Procedure    Sedation: yes  Performed under: local infiltration  Guidance:ultrasound guided    ULTRASOUND INTERPRETATION.  Using ultrasound guidance a 21 G gauge needle was placed in close proximity to the nerve, at which point, under ultrasound guidance anesthetic was injected in the area of the nerve and spread of the anesthesia was seen on ultrasound in close proximity thereto.  There were no abnormalities seen on ultrasound; a digital image was taken; and the patient tolerated the procedure with no complications. Images:still images obtained    Laterality:Bilateral  Block Type:TAP  Injection Technique:single-shot  Needle Type:echogenic  Needle Gauge:21 G  Resistance on Injection: none    Medications Used: bupivacaine PF (MARCAINE) 0.25 % injection - Injection   20 mL - 7/8/2025 9:43:00 AM  bupivacaine liposome (EXPAREL) 1.3 % injection - Infiltration   20 mL - 7/8/2025 9:43:00 AM      Post Assessment  Injection Assessment: negative aspiration for heme, no paresthesia on injection and incremental injection  Patient Tolerance:comfortable throughout block  Complications:no  Additional Notes  Ultrasound interpretation note:  Under ultrasound guidance, needle seen near nerves, local seen spreading around.  No abnormalities noted.  Block for postop pain per surgeon request.  Performed by:  Mayito Burgess MD

## 2025-07-08 NOTE — NURSING NOTE
CWON note: consult received for wound vac to RLQ ileostomy takedown site. Per Dr Rowe the wound is 7x 5x 5cms. Wound vac was placed in the OR today. Pt will need paperwork for home VAC completed with dressing change due on Friday.

## 2025-07-08 NOTE — OP NOTE
Surgeon: Marcos Rowe MD    Surgical  Assistant: Rosalia Mario PA-C     Preoperative diagnosis: Malignant neoplasm of sigmoid colon [C18.7]    Post-Op Diagnosis Codes:     * Malignant neoplasm of sigmoid colon [C18.7]    Procedure: ILEOSTOMY TAKEDOWN, WOUND VAC PLACEMENT, * Panel 2 does not exist *    Estimated Blood Loss: 100ml    Specimens:   Specimens       ID Source Type Tests Collected By Collected At Frozen?    A Small Intestine Tissue TISSUE PATHOLOGY EXAM   Marcos Rowe MD 7/8/25 1006 No    Description: ileostomy           Order Name Source Comment Collection Info Order Time   TISSUE PATHOLOGY EXAM Small Intestine  Collected By: Marcos Rowe MD 7/8/2025 10:07 AM     Release to patient   Routine Release            Indication:  Jeanine Guzman is a 70 y.o. female who comes in with Malignant neoplasm of sigmoid colon  Patient understands risks, benefits,and alternatives wishes to proceed.      Procedure Details:      Patient was brought to the operating room, SCDs in place, antibiotics infused. After general anesthesia was achieved, a TAP block was done. Patient was prepped and draped in sterile fashion. Then, 0.25% Marcaine with epinephrine was used as local infiltration. First, I made an incision at the mucocutaneous junction and it was carried down through the soft tissue. Adhesions were taken down sharply. This was carried down all the way to the level of the fascia. Once I got into the fascia, I was able to get circumferentially around. Made sure there were no adhesions to the abdominal wall.  I made an enterotomy in the afferent limb then another in the efferent limb.   I did not use most of the small intestine that was attached to  the abdominal wall because of all the dissection I had to use, that bowel was fairly beaten up.  I used a 75 PHANI blue load stapler to make the common channel at the antimesenteric border of both limbs.  I took the mesentery with the EnSeal and then any  bleeding was controlled with 2-0 Vicryl.  I closed the common enterotomy with another load of the 75 PHANI which also resected the ileostomy.  I oversewed this staple line with 2-0 Vicryl's in a Lembert fashion.  I put Tisseel around all the staple lines, put it back into the abdomen.  The posterior then anterior fascia was closed with a #1 PDS.  The wound was irrigated out.  A wound VAC was placed as dressing.  Laps, instruments, and needle counts were correct.  Patient was then taken to recovery.      Assistant: Rosalia Mario PA-C was responsible for performing the following activities: Retraction, Suction, Irrigation, Suturing, Closing and Placing Dressing and their skilled assistance was necessary for the success of this case

## 2025-07-08 NOTE — DISCHARGE PLACEMENT REQUEST
"Jeanine Guzman \"ELIZABETH\" (70 y.o. Female)       Date of Birth   1955    Social Security Number       Address   [address] [place] [address]    Home Phone   [phone]    MRN   [id number]       [facility]    Marital Status                               Admission Date   7/8/2025    Admission Type   Elective    Admitting Provider   Marcos Rowe MD    Attending Provider   Marcos Rowe MD    Department, Room/Bed   [address], [address]/1       Discharge Date       Discharge Disposition       Discharge Destination                                 Attending Provider: Marcos Rowe MD    Allergies: Latex, Statins, Morphine, Sulfa Antibiotics    Isolation: None   Infection: None   Code Status: CPR    Ht: 154.9 cm (61\")   Wt: 61.2 kg (135 lb)    Admission Cmt: None   Principal Problem: Colon cancer [C18.9]                   Active Insurance as of 7/8/2025       Primary Coverage       Payor Plan Insurance Group Employer/Plan Group    MEDICARE MEDICARE A & B        Payor Plan Address Payor Plan Phone Number Payor Plan Fax Number Effective Dates    [address] [phone]  7/1/2020 - None Entered    [facility] [id number]         Subscriber Name Subscriber Birth Date Member ID       JEANINE GUZMAN 1955 [id number]               Secondary Coverage       Payor Plan Insurance Group Employer/Plan Group    LUMICO LUMICO PLAN G       Payor Plan Address Payor Plan Phone Number Payor Plan Fax Number Effective Dates    [address] [address]   12/1/2023 - None Entered    [facility] [phone]         Subscriber Name Subscriber Birth Date Member ID       JEANINE GUZMAN 1955 [id number]                     Emergency Contacts        (Rel.) Home Phone Work Phone Mobile Phone    KASH UGZMAN (Spouse) [phone] -- [phone]                "

## 2025-07-08 NOTE — ANESTHESIA PREPROCEDURE EVALUATION
Anesthesia Evaluation     Patient summary reviewed and Nursing notes reviewed   history of anesthetic complications:  PONV  NPO Solid Status: > 8 hours  NPO Liquid Status: > 4 hours           Airway   Mallampati: II  TM distance: <3 FB  Neck ROM: full  No difficulty expected  Dental - normal exam     Pulmonary     breath sounds clear to auscultation  Cardiovascular     Rhythm: regular    (+) hypertension, hyperlipidemia      Neuro/Psych  (+) headaches, psychiatric history Anxiety  GI/Hepatic/Renal/Endo    (+) GERD, GI bleeding     Musculoskeletal     Abdominal    Substance History      OB/GYN          Other   arthritis,   history of cancer      Other Comment: Recent colon surgery                  Anesthesia Plan    ASA 3     general     (TAP block)  intravenous induction     Anesthetic plan, risks, benefits, and alternatives have been provided, discussed and informed consent has been obtained with: patient.      CODE STATUS:

## 2025-07-09 LAB
ANION GAP SERPL CALCULATED.3IONS-SCNC: 12.1 MMOL/L (ref 5–15)
BASOPHILS # BLD AUTO: 0.01 10*3/MM3 (ref 0–0.2)
BASOPHILS NFR BLD AUTO: 0.1 % (ref 0–1.5)
BUN SERPL-MCNC: 13 MG/DL (ref 8–23)
BUN/CREAT SERPL: 22.4 (ref 7–25)
CALCIUM SPEC-SCNC: 9.1 MG/DL (ref 8.6–10.5)
CHLORIDE SERPL-SCNC: 103 MMOL/L (ref 98–107)
CO2 SERPL-SCNC: 21.9 MMOL/L (ref 22–29)
CREAT SERPL-MCNC: 0.58 MG/DL (ref 0.57–1)
CYTO UR: NORMAL
DEPRECATED RDW RBC AUTO: 40 FL (ref 37–54)
EGFRCR SERPLBLD CKD-EPI 2021: 97.5 ML/MIN/1.73
EOSINOPHIL # BLD AUTO: 0 10*3/MM3 (ref 0–0.4)
EOSINOPHIL NFR BLD AUTO: 0 % (ref 0.3–6.2)
ERYTHROCYTE [DISTWIDTH] IN BLOOD BY AUTOMATED COUNT: 13 % (ref 12.3–15.4)
GLUCOSE SERPL-MCNC: 87 MG/DL (ref 65–99)
HCT VFR BLD AUTO: 37.3 % (ref 34–46.6)
HGB BLD-MCNC: 12.3 G/DL (ref 12–15.9)
IMM GRANULOCYTES # BLD AUTO: 0.03 10*3/MM3 (ref 0–0.05)
IMM GRANULOCYTES NFR BLD AUTO: 0.3 % (ref 0–0.5)
LAB AP CASE REPORT: NORMAL
LYMPHOCYTES # BLD AUTO: 1.04 10*3/MM3 (ref 0.7–3.1)
LYMPHOCYTES NFR BLD AUTO: 11.6 % (ref 19.6–45.3)
MAGNESIUM SERPL-MCNC: 2.2 MG/DL (ref 1.6–2.4)
MCH RBC QN AUTO: 28.1 PG (ref 26.6–33)
MCHC RBC AUTO-ENTMCNC: 33 G/DL (ref 31.5–35.7)
MCV RBC AUTO: 85.2 FL (ref 79–97)
MONOCYTES # BLD AUTO: 0.98 10*3/MM3 (ref 0.1–0.9)
MONOCYTES NFR BLD AUTO: 10.9 % (ref 5–12)
NEUTROPHILS NFR BLD AUTO: 6.89 10*3/MM3 (ref 1.7–7)
NEUTROPHILS NFR BLD AUTO: 77.1 % (ref 42.7–76)
NRBC BLD AUTO-RTO: 0 /100 WBC (ref 0–0.2)
PATH REPORT.FINAL DX SPEC: NORMAL
PATH REPORT.GROSS SPEC: NORMAL
PLATELET # BLD AUTO: 329 10*3/MM3 (ref 140–450)
PMV BLD AUTO: 9.3 FL (ref 6–12)
POTASSIUM SERPL-SCNC: 3.7 MMOL/L (ref 3.5–5.2)
RBC # BLD AUTO: 4.38 10*6/MM3 (ref 3.77–5.28)
SODIUM SERPL-SCNC: 137 MMOL/L (ref 136–145)
WBC NRBC COR # BLD AUTO: 8.95 10*3/MM3 (ref 3.4–10.8)

## 2025-07-09 PROCEDURE — 99024 POSTOP FOLLOW-UP VISIT: CPT | Performed by: PHYSICIAN ASSISTANT

## 2025-07-09 PROCEDURE — 97162 PT EVAL MOD COMPLEX 30 MIN: CPT

## 2025-07-09 PROCEDURE — 80048 BASIC METABOLIC PNL TOTAL CA: CPT | Performed by: COLON & RECTAL SURGERY

## 2025-07-09 PROCEDURE — 85025 COMPLETE CBC W/AUTO DIFF WBC: CPT | Performed by: COLON & RECTAL SURGERY

## 2025-07-09 PROCEDURE — 97110 THERAPEUTIC EXERCISES: CPT

## 2025-07-09 PROCEDURE — 83735 ASSAY OF MAGNESIUM: CPT | Performed by: COLON & RECTAL SURGERY

## 2025-07-09 RX ORDER — LATANOPROST 50 UG/ML
1 SOLUTION/ DROPS OPHTHALMIC NIGHTLY
Status: DISCONTINUED | OUTPATIENT
Start: 2025-07-09 | End: 2025-07-10 | Stop reason: HOSPADM

## 2025-07-09 RX ADMIN — CELECOXIB 200 MG: 200 CAPSULE ORAL at 09:00

## 2025-07-09 RX ADMIN — ACETAMINOPHEN 1000 MG: 500 TABLET, FILM COATED ORAL at 12:07

## 2025-07-09 RX ADMIN — GABAPENTIN 300 MG: 300 CAPSULE ORAL at 21:43

## 2025-07-09 RX ADMIN — ACETAMINOPHEN 1000 MG: 500 TABLET, FILM COATED ORAL at 17:49

## 2025-07-09 RX ADMIN — ALVIMOPAN 12 MG: 12 CAPSULE ORAL at 09:00

## 2025-07-09 RX ADMIN — ALVIMOPAN 12 MG: 12 CAPSULE ORAL at 21:43

## 2025-07-09 RX ADMIN — PANTOPRAZOLE SODIUM 40 MG: 40 TABLET, DELAYED RELEASE ORAL at 06:49

## 2025-07-09 RX ADMIN — ACETAMINOPHEN 1000 MG: 500 TABLET, FILM COATED ORAL at 06:49

## 2025-07-09 RX ADMIN — ACETAMINOPHEN 1000 MG: 500 TABLET, FILM COATED ORAL at 00:01

## 2025-07-09 RX ADMIN — CELECOXIB 200 MG: 200 CAPSULE ORAL at 21:43

## 2025-07-09 NOTE — PLAN OF CARE
Goal Outcome Evaluation:  Plan of Care Reviewed With: patient        Progress: improving  Outcome Evaluation: Pt has no c/o pain. Confused to time and situation. HR SBrady in the 40s during the night, provider aware, instructed to hold morning gabapentin dose. Wound vac remains in place. x2 assist to BSC, unsteady gait. Room air. Safety maintained. Plan of care ongoing.

## 2025-07-09 NOTE — PLAN OF CARE
Goal Outcome Evaluation:  Plan of Care Reviewed With: patient, family           Outcome Evaluation: Pt 69 yo female POD 1 s/p ILEOSTOMY TAKEDOWN, WOUND VAC PLACEMENT. Pt reports baseline she is independent doesnt use AD. On PT exam pt pleasant and cooperative, ambulated 80ft CGA rwx assist for wound vac management. Pt does have occassional posterior lean due to core/LE weakness post op. Pt may benefit from skilled PT acutely for generalized strengthening, anticipate return home w family support, HHC as needed.    Anticipated Discharge Disposition (PT): home with 24/7 care, home with home health

## 2025-07-09 NOTE — THERAPY EVALUATION
Acute Care - Physical Therapy Initial Evaluation  [facility]     Patient Name: Jeanine Guzman  : 1955  MRN: [id number]  Today's Date: 2025   Onset of Illness/Injury or Date of Surgery: 25  Visit Dx:     ICD-10-CM ICD-9-CM   1. Malignant neoplasm of sigmoid colon  C18.7 153.3     Patient Active Problem List   Diagnosis    Colitis    BRBPR (bright red blood per rectum)    Diarrhea    Rectal mass    Cancer of sigmoid    Colon cancer    Primary hypertension    Abnormal x-ray of pelvis    Abnormal findings on diagnostic imaging of other parts of digestive tract    Rectal cancer     Past Medical History:   Diagnosis Date    Anxiety     Arthritis     HANDS    Colon cancer 2025    Colon polyp 2022    Diverticulosis     GERD (gastroesophageal reflux disease)     GI (gastrointestinal bleed)     Glaucoma     Headache     History of chest pain 2025    CARDIAC EVAL NEGATIVE    History of Clostridium difficile infection     History of COVID-19     History of kidney stones     History of migraine     History of UTI     Hyperlipidemia 2022    Hypertension     Inflammatory bowel disease 25    Ischemic colitis    PONV (postoperative nausea and vomiting)     Rectal bleeding      Past Surgical History:   Procedure Laterality Date    APPENDECTOMY      COLON RESECTION N/A 2025    Procedure: COLON RESECTION LOW ANTERIOR LAPAROSCOPIC WITH DAVINCI ROBOT AND DIVERTING LOOP ILEOSTOMY;  Surgeon: Marcos Rowe MD;  Location: [facility] OR;  Service: Robotics - DaVinci;  Laterality: N/A;    COLONOSCOPY  22    COLONOSCOPY N/A 2025    Procedure: COLONOSCOPY TO CECUM WITH BIOPSY AND SPOT INK MARKER; POLYPECTOMY ( COLD SNARE);  Surgeon: Juan Jose Horner MD;  Location: [facility] ENDOSCOPY;  Service: Gastroenterology;  Laterality: N/A;  COLITIS; DIARRHEA  POST:HEMORRHOIDS; DIVERTICULOSIS; SIGMOID MASS; COLITIS; COLON POLYP    EYE SURGERY  2019    Cataracts     FRACTURE SURGERY  2010    Broken wrist    HYSTERECTOMY      Partial- 30 plus years ago    ILEOSTOMY CLOSURE N/A 7/8/2025    Procedure: ILEOSTOMY TAKEDOWN, WOUND VAC PLACEMENT;  Surgeon: Marcos Rivas MD;  Location: [facility] OR;  Service: General;  Laterality: N/A;    SIGMOIDOSCOPY N/A 07/03/2025    PATENT AND HEALTHY ANASTAMOSIS IN MID RECTUM, RESCOPE IN 8 MONTHS, DR. MARCOS RIVAS AT [facility]     PT Assessment (Last 12 Hours)       PT Evaluation and Treatment       Row Name 07/09/25 1200          Physical Therapy Time and Intention    Subjective Information no complaints  -     Document Type evaluation  -     Mode of Treatment individual therapy;physical therapy  -     Patient Effort good  -     Symptoms Noted During/After Treatment none  -       Row Name 07/09/25 1200          General Information    Patient Profile Reviewed yes  -     Onset of Illness/Injury or Date of Surgery 07/08/25  -     Patient Observations alert;cooperative;agree to therapy  -     General Observations of Patient pt supine in bed no acute distress  -     Prior Level of Function independent:  -     Equipment Currently Used at Home none  -     Pertinent History of Current Functional Problem s/p ILEOSTOMY TAKEDOWN, WOUND VAC PLACEMENT  -     Existing Precautions/Restrictions fall  wound vac  -     Benefits Reviewed patient:  -     Barriers to Rehab none identified  -       Row Name 07/09/25 1200          Living Environment    Current Living Arrangements home  -     People in Home spouse  -       Row Name 07/09/25 1200          Pain    Pretreatment Pain Rating 0/10 - no pain  -     Posttreatment Pain Rating 0/10 - no pain  -       Row Name 07/09/25 1200          Cognition    Affect/Mental Status (Cognition) WFL  -       Row Name 07/09/25 1200          Range of Motion Comprehensive    General Range of Motion no range of motion deficits identified  -       Row Name 07/09/25 1200          Strength  Comprehensive (MMT)    Comment, General Manual Muscle Testing (MMT) Assessment LEs at least 3/5  -       Row Name 07/09/25 1200          Bed Mobility    Bed Mobility sit-supine;supine-sit  -     Supine-Sit [place] (Bed Mobility) standby assist  -     Assistive Device (Bed Mobility) bed rails  -       Row Name 07/09/25 1200          Transfers    Transfers sit-stand transfer;stand-sit transfer  -       Row Name 07/09/25 1200          Sit-Stand Transfer    Sit-Stand [place] (Transfers) contact guard;set up;verbal cues;nonverbal cues (demo/gesture);minimum assist (75% patient effort)  -     Assistive Device (Sit-Stand Transfers) walker, front-wheeled  -     Comment, (Sit-Stand Transfer) occassional posterior lean MIn A to recover  -       Row Name 07/09/25 1200          Stand-Sit Transfer    Stand-Sit [place] (Transfers) contact guard;set up;verbal cues;nonverbal cues (demo/gesture)  -     Assistive Device (Stand-Sit Transfers) walker, front-wheeled  -       Row Name 07/09/25 1200          Gait/Stairs (Locomotion)    [place] Level (Gait) contact guard;set up;verbal cues;nonverbal cues (demo/gesture)  -     Assistive Device (Gait) walker, front-wheeled  -     Distance in Feet (Gait) 80  -     Pattern (Gait) step-through  -     Deviations/Abnormal Patterns (Gait) shira decreased  -     Bilateral Gait Deviations forward flexed posture;heel strike decreased  -       Row Name 07/09/25 1200          Balance    Balance Assessment sitting static balance;standing static balance  -     Static Sitting Balance standby assist  -     Position, Sitting Balance supported;sitting edge of bed  -     Static Standing Balance contact guard;minimal assist  -     Position/Device Used, Standing Balance supported;walker, front-wheeled  -       Row Name 07/09/25 1200          Motor Skills    Therapeutic Exercise --  Aps, LAQs x 10  -       Row Name             Wound 07/08/25 0951  Right lateral abdomen Surgical Open Surgical Incision    Wound - Properties Group Placement Date: 07/08/25  -CB Placement Time: 0951  -CB Present on Original Admission: Y  -CB Side: Right  -CB Orientation: lateral  -CB Location: abdomen  -CB Primary Wound Type: Surgical  -CB Secondary Wound Type - Surgical: Open Surg  -CB Additional Comments: WOUND VAC PLACEMENT  -CB    Retired Wound - Properties Group Placement Date: 07/08/25  -CB Placement Time: 0951  -CB Present on Original Admission: Y  -CB Side: Right  -CB Orientation: lateral  -CB Location: abdomen  -CB Additional Comments: WOUND VAC PLACEMENT  -CB    Retired Wound - Properties Group Placement Date: 07/08/25  -CB Placement Time: 0951  -CB Present on Original Admission: Y  -CB Side: Right  -CB Orientation: lateral  -CB Location: abdomen  -CB Additional Comments: WOUND VAC PLACEMENT  -CB    Retired Wound - Properties Group Date first assessed: 07/08/25  -CB Time first assessed: 0951  -CB Present on Original Admission: Y  -CB Side: Right  -CB Location: abdomen  -CB Additional Comments: WOUND VAC PLACEMENT  -CB      Row Name             NPWT (Negative Pressure Wound Therapy) 07/08/25 1044 RIGHT ABDOMEN    NPWT (Negative Pressure Wound Therapy) - Properties Group Placement Date: 07/08/25  -CB Placement Time: 1044  -CB Location: RIGHT ABDOMEN  -CB    Retired NPWT (Negative Pressure Wound Therapy) - Properties Group Placement Date: 07/08/25  -CB Placement Time: 1044  -CB Location: RIGHT ABDOMEN  -CB    Retired NPWT (Negative Pressure Wound Therapy) - Properties Group Placement Date: 07/08/25  -CB Placement Time: 1044  -CB Location: RIGHT ABDOMEN  -CB    Retired NPWT (Negative Pressure Wound Therapy) - Properties Group Placement Date: 07/08/25  -CB Placement Time: 1044  -CB Location: RIGHT ABDOMEN  -CB      Row Name 07/09/25 1200          Plan of Care Review    Plan of Care Reviewed With patient;family  -     Outcome Evaluation Pt 69 yo female POD 1 s/p ILEOSTOMY  TAKEDOWN, WOUND VAC PLACEMENT. Pt reports baseline she is independent doesnt use AD. On PT exam pt pleasant and cooperative, ambulated 80ft CGA rwx assist for wound vac management. Pt does have occassional posterior lean due to core/LE weakness post op. Pt may benefit from skilled PT acutely for generalized strengthening, anticipate return home w family support, C as needed.  -       Row Name 07/09/25 1200          Positioning and Restraints    Pre-Treatment Position in bed  -     Post Treatment Position chair  -     In Chair reclined;call light within reach;encouraged to call for assist;exit alarm on;with family/caregiver;notified nsg  -       Row Name 07/09/25 1200          Therapy Assessment/Plan (PT)    Rehab Potential (PT) good  -     Criteria for Skilled Interventions Met (PT) yes  -     Therapy Frequency (PT) 5 times/wk  -       Row Name 07/09/25 1200          PT Evaluation Complexity    History, PT Evaluation Complexity 1-2 personal factors and/or comorbidities  -     Examination of Body Systems (PT Eval Complexity) total of 3 or more elements  -     Clinical Presentation (PT Evaluation Complexity) evolving  -     Clinical Decision Making (PT Evaluation Complexity) moderate complexity  -     Overall Complexity (PT Evaluation Complexity) moderate complexity  -       Row Name 07/09/25 1200          Physical Therapy Goals    Gait Training Goal Selection (PT) gait training, PT goal 1  -[facility] Name 07/09/25 1200          Gait Training Goal 1 (PT)    Activity/Assistive Device (Gait Training Goal 1, PT) gait (walking locomotion);walker, rolling  -     [place] Level (Gait Training Goal 1, PT) modified independence  -     Distance (Gait Training Goal 1, PT) 150  -     Time Frame (Gait Training Goal 1, PT) 1 week  -               User Key  (r) = Recorded By, (t) = Taken By, (c) = Cosigned By      Initials Name Provider Type     Toma Conti, PT Physical Therapist    CB  Kami Diaz, RN Registered Nurse                    Physical Therapy Education       Title: PT OT SLP Therapies (In Progress)       Topic: Physical Therapy (In Progress)       Point: Mobility training (In Progress)       Learning Progress Summary            Patient Acceptance, E, NR by  at 7/9/2025 1242                      Point: Home exercise program (In Progress)       Learning Progress Summary            Patient Acceptance, E, NR by  at 7/9/2025 1242                      Point: Body mechanics (In Progress)       Learning Progress Summary            Patient Acceptance, E, NR by  at 7/9/2025 1242                      Point: Precautions (In Progress)       Learning Progress Summary            Patient Acceptance, E, NR by  at 7/9/2025 1242                                      User Key       Initials Effective Dates Name Provider Type Discipline     06/16/21 -  Toma Conti, PT Physical Therapist PT                  PT Recommendation and Plan  Anticipated Discharge Disposition (PT): home with 24/7 care, home with home health  Planned Therapy Interventions (PT): balance training, gait training, bed mobility training, home exercise program, ROM (range of motion), stair training, strengthening, stretching, transfer training  Therapy Frequency (PT): 5 times/wk  Plan of Care Reviewed With: patient, family  Outcome Evaluation: Pt 71 yo female POD 1 s/p ILEOSTOMY TAKEDOWN, WOUND VAC PLACEMENT. Pt reports baseline she is independent doesnt use AD. On PT exam pt pleasant and cooperative, ambulated 80ft CGA rwx assist for wound vac management. Pt does have occassional posterior lean due to core/LE weakness post op. Pt may benefit from skilled PT acutely for generalized strengthening, anticipate return home w family support, C as needed.   Outcome Measures       Row Name 07/09/25 1200             How much help from another person do you currently need...    Turning from your back to your side while in flat  bed without using bedrails? 3  -LH      Moving from lying on back to sitting on the side of a flat bed without bedrails? 3  -LH      Moving to and from a bed to a chair (including a wheelchair)? 3  -LH      Standing up from a chair using your arms (e.g., wheelchair, bedside chair)? 3  -LH      Climbing 3-5 steps with a railing? 3  -LH      To walk in hospital room? 3  -      AM-PAC 6 Clicks Score (PT) 18  -                User Key  (r) = Recorded By, (t) = Taken By, (c) = Cosigned By      Initials Name Provider Type     Toma Conti, PT Physical Therapist                     Time Calculation:    PT Charges       Row Name 07/09/25 1242             Time Calculation    Start Time 1043  -      Stop Time 1100  -      Time Calculation (min) 17 min  -      PT Received On 07/09/25  -      PT - Next Appointment 07/10/25  -      PT Goal Re-Cert Due Date 07/23/25  -         Time Calculation- PT    Total Timed Code Minutes- PT 8 minute(s)  -                User Key  (r) = Recorded By, (t) = Taken By, (c) = Cosigned By      Initials Name Provider Type     Toma Conti, PT Physical Therapist                  Therapy Charges for Today       Code Description Service Date Service Provider Modifiers Qty    70909401810 HC PT EVAL MOD COMPLEXITY 3 7/9/2025 Toma Conti, PT GP 1    55884737921 HC PT THER PROC EA 15 MIN 7/9/2025 Toma Conti, PT GP 1            PT G-Codes  AM-PAC 6 Clicks Score (PT): 18    Toma Conti, PT  7/9/2025

## 2025-07-09 NOTE — CASE MANAGEMENT/SOCIAL WORK
Continued Stay Note  [facility]     Patient Name: Jeanine Guzman  MRN: [id number]  Today's Date: 7/9/2025    Admit Date: 7/8/2025    Plan: Home with spouse and [facility]. [employer] wound Vac ordered but pt states she is not going home with wound vac. Family transport   Discharge Plan       Row Name 07/09/25 1633       Plan    Plan Home with spouse and [facility]. [employer] wound Vac ordered but pt states she is not going home with wound vac. Family transport    Patient/Family in Agreement with Plan yes    Plan Comments Met with pt and spouse at bedside. Explained roll of . Face sheet and pharmacy verified. Pt lives with her spouse in a single-story house with 2 steps to enter. Home DME includes ostomy supplies.  Pt is normally independent with ADLs. Pt has never been to Rehab.  She has used [facility] and wants to use them again. [facility] has accepted. Pt was ordered to have [employer] wound vac at D/C. Pt states she is not going home with wound vac because she is under enough stress as it is right now. States she will do wet to dry dressing changes instead. [employer] wound vac order and documentation faxed to [facility] late afternoon yesterday. Pt's PCP is Kori Patel DO. Enrolled in Meds to Bed. Pt normally drives. At discharge, family will transport. Explained that CCP would follow to assess for discharge needs.  Pino Mckenna RN-BC                   Discharge Codes    No documentation.                 Expected Discharge Date and Time       Expected Discharge Date Expected Discharge Time    Jul 10, 2025               Pino Mckenna RN

## 2025-07-09 NOTE — CASE MANAGEMENT/SOCIAL WORK
Discharge Planning Assessment  [facility]     Patient Name: Jeanine Guzman  MRN: [id number]  Today's Date: 7/9/2025    Admit Date: 7/8/2025    Plan: Home with spouse and [facility]. [employer] wound Vac ordered but pt states she is not going home with wound vac. Family transport   Discharge Needs Assessment       Row Name 07/09/25 1641       Living Environment    People in Home spouse    Current Living Arrangements home    Potentially Unsafe Housing Conditions none    In the past 12 months has the electric, gas, oil, or water company threatened to shut off services in your home? No    Primary Care Provided by self    Provides Primary Care For no one    Family Caregiver if Needed spouse    Quality of Family Relationships involved;supportive    Able to Return to Prior Arrangements yes       Resource/Environmental Concerns    Resource/Environmental Concerns none    Transportation Concerns none       Transportation Needs    In the past 12 months, has lack of transportation kept you from medical appointments or from getting medications? no    In the past 12 months, has lack of transportation kept you from meetings, work, or from getting things needed for daily living? No       Food Insecurity    Within the past 12 months, you worried that your food would run out before you got the money to buy more. Never true    Within the past 12 months, the food you bought just didn't last and you didn't have money to get more. Never true       Transition Planning    Patient/Family Anticipates Transition to home with family    Patient/Family Anticipated Services at Transition home health care    Transportation Anticipated family or friend will provide       Discharge Needs Assessment    Readmission Within the Last 30 Days no previous admission in last 30 days    Equipment Currently Used at Home colostomy/ostomy supplies    Concerns to be Addressed care coordination/care conferences;discharge planning    Do you want help with school or  training? For example, starting or completing job training or getting a high school diploma, GED or equivalent No    Anticipated Changes Related to Illness none    Equipment Needed After Discharge wound care supplies    Discharge Facility/Level of Care Needs home with home health    Provided Post Acute Provider List? Refused    Provided Post Acute Provider Quality & Resource List? Refused    Offered/Gave Vendor List no    Patient's Choice of Community Agency(s) Wants to use [facility] again    Current Discharge Risk chronically ill                   Discharge Plan       Row Name 07/09/25 9495       Plan    Plan Home with spouse and [facility]. [employer] wound Vac ordered but pt states she is not going home with wound vac. Family transport    Patient/Family in Agreement with Plan yes    Plan Comments Met with pt and spouse at bedside. Explained roll of . Face sheet and pharmacy verified. Pt lives with her spouse in a single-story house with 2 steps to enter. Home DME includes ostomy supplies.  Pt is normally independent with ADLs. Pt has never been to Rehab.  She has used [facility] and wants to use them again. [facility] has accepted. Pt was ordered to have [employer] wound vac at D/C. Pt states she is not going home with wound vac because she is under enough stress as it is right now. States she will do wet to dry dressing changes instead. [employer] wound vac order and documentation faxed to [facility] late afternoon yesterday. Pt's PCP is Kori Patel DO. Enrolled in Meds to Bed. Pt normally drives. At discharge, family will transport. Explained that CCP would follow to assess for discharge needs.  Pino Mckenna RN-BC                  Continued Care and Services - Admitted Since 7/8/2025       Durable Medical Equipment Coordination complete.      Service Provider Request Status Services Address Phone Fax Patient Preferred    [facility]  Selected Durable Medical Equipment [address] [address] [phone] --               Home Medical Care Coordination complete.      Service Provider Request Status Services Address Phone Fax Patient Preferred    [facility] [place]  Selected Home Nursing, Home Rehabilitation [address], [address] [address] [phone] --                  Selected Continued Care - Prior Encounters Includes continued care and service providers with selected services from prior encounters from 4/9/2025 to 7/9/2025      Discharged on 5/16/2025 Admission date: 5/13/2025 - Discharge disposition: Home-Health Care [facility]      Home Medical Care       Service Provider Services Address Phone Fax Patient Preferred    [facility] [place] Home Nursing, Home Rehabilitation [address], [address] [address] [phone] --                          Expected Discharge Date and Time       Expected Discharge Date Expected Discharge Time    Jul 10, 2025            Demographic Summary       Row Name 07/09/25 1640       General Information    Admission Type inpatient    Arrived From PACU/recovery room    Required Notices Provided Important Message from Medicare    Reason for Consult care coordination/care conference;discharge planning    Preferred Language English                   Functional Status       Row Name 07/09/25 1640       Functional Status    Usual Activity Tolerance good    Current Activity Tolerance moderate       Functional Status, IADL    Medications independent    Meal Preparation independent    Housekeeping independent    Laundry independent    Shopping independent    If for any reason you need help with day-to-day activities such as bathing, preparing meals, shopping, managing finances, etc., do you get the help you need? I don't need any help       Mental Status    General Appearance WDL WDL       Mental Status Summary    Recent Changes in Mental Status/Cognitive Functioning no changes        Employment/    Employment Status retired                              Pino Mckenna RN

## 2025-07-09 NOTE — PROGRESS NOTES
Progress Note    Pod 1      S: Pt sore near incision in the RLQ. Tolerating breakfast tray with full liquids. + flatulence, but has not had a BM yet.     O:  Temp:  [97.3 °F (36.3 °C)-97.7 °F (36.5 °C)] 97.3 °F (36.3 °C)  Heart Rate:  [42-83] 56  Resp:  [16-21] 16  BP: (117-146)/(46-96) 117/54      Intake/Output Summary (Last 24 hours) at 7/9/2025 0853  Last data filed at 7/9/2025 0525  Gross per 24 hour   Intake 600 ml   Output 1700 ml   Net -1100 ml       Abd:  Soft, non-distended  Incision: Wound-vac in place over RLQ wound      Results from last 7 days   Lab Units 07/09/25  0440   WBC 10*3/mm3 8.95   HEMOGLOBIN g/dL 12.3   HEMATOCRIT % 37.3   PLATELETS 10*3/mm3 329     Results from last 7 days   Lab Units 07/09/25  0440   SODIUM mmol/L 137   POTASSIUM mmol/L 3.7   CHLORIDE mmol/L 103   CO2 mmol/L 21.9*   BUN mg/dL 13.0   CREATININE mg/dL 0.58   EGFR mL/min/1.73 97.5   GLUCOSE mg/dL 87   CALCIUM mg/dL 9.1       Results from last 7 days   Lab Units 07/09/25  0440   MAGNESIUM mg/dL 2.2         A/P: 70 y.o. female with s/p ILEOSTOMY TAKEDOWN, WOUND VAC PLACEMENT    - Full liquid diet. Okay to advance to a regular diet as tolerated.   - Awaiting bowel function. Continue Entereg.   - Pt states that she does not wish to continue the wound-vac after discharge. Explained to Pt that the wound-vac facilitates healing allowing her to heal faster. Also explained to the Pt that the wound-vac she would go home with would be smaller than the one here in the hospital. Pt states that she does not wish to be connected to anything and prefers to switch to wet-to-dry dressings at discharge.

## 2025-07-09 NOTE — PLAN OF CARE
Goal Outcome Evaluation:  Plan of Care Reviewed With: patient        Progress: improving  Outcome Evaluation: Patient alert and oriented, no c/o pain or soa. Anxious about wound vac, doesnt want at home. Tolerating GI soft diet. +void, +flatus, -bm. Up in chair and ambulated in room, gait unstready. HR 40-60's. Spouse at bedside.

## 2025-07-09 NOTE — NURSING NOTE
Cwon note: spoke to Pino Mckenna RN, CM and she has faxed the home wound vac order to  and has [employer]  set up to follow patient for wound vac dsg changes. Will continue to follow for vac approval status.

## 2025-07-10 ENCOUNTER — READMISSION MANAGEMENT (OUTPATIENT)
Dept: CALL CENTER | Facility: HOSPITAL | Age: 70
End: 2025-07-10
Payer: MEDICARE

## 2025-07-10 VITALS
WEIGHT: 135 LBS | HEART RATE: 55 BPM | BODY MASS INDEX: 25.49 KG/M2 | DIASTOLIC BLOOD PRESSURE: 57 MMHG | SYSTOLIC BLOOD PRESSURE: 109 MMHG | OXYGEN SATURATION: 100 % | TEMPERATURE: 97.5 F | HEIGHT: 61 IN | RESPIRATION RATE: 16 BRPM

## 2025-07-10 PROCEDURE — 99024 POSTOP FOLLOW-UP VISIT: CPT | Performed by: PHYSICIAN ASSISTANT

## 2025-07-10 PROCEDURE — 97530 THERAPEUTIC ACTIVITIES: CPT

## 2025-07-10 RX ADMIN — ACETAMINOPHEN 1000 MG: 500 TABLET, FILM COATED ORAL at 00:22

## 2025-07-10 RX ADMIN — GABAPENTIN 300 MG: 300 CAPSULE ORAL at 09:17

## 2025-07-10 RX ADMIN — PANTOPRAZOLE SODIUM 40 MG: 40 TABLET, DELAYED RELEASE ORAL at 06:54

## 2025-07-10 RX ADMIN — LATANOPROST 1 DROP: 50 SOLUTION OPHTHALMIC at 00:23

## 2025-07-10 RX ADMIN — ACETAMINOPHEN 1000 MG: 500 TABLET, FILM COATED ORAL at 06:55

## 2025-07-10 RX ADMIN — ACETAMINOPHEN 1000 MG: 500 TABLET, FILM COATED ORAL at 11:48

## 2025-07-10 RX ADMIN — CELECOXIB 200 MG: 200 CAPSULE ORAL at 09:17

## 2025-07-10 NOTE — NURSING NOTE
Cwon follow up.  Patient does not wish to continue wound vac at home once dc'd from hospital. She wishes to perform wet to dry dsg changes. She will no longer need a home machine from [employer]. Surgery is aware. See HERMAN Petersen notes. Nursing to remove wound vac dsg prior to dc and place a NS wet to dry dressing. Please call with any questions. Possible dc home today or tomorrow.

## 2025-07-10 NOTE — PLAN OF CARE
Goal Outcome Evaluation:  Plan of Care Reviewed With: patient        Progress: improving  Outcome Evaluation: Pt improving, increased gait distance this date pt ambulated 150ft supervision, PT assisted w carrying wound vac. Pt has met all PT goals. encouraged continued ambulation w staff/family. will sign off acutely. anticipate DC home w family support.    Anticipated Discharge Disposition (PT): home with assist, home with home health

## 2025-07-10 NOTE — PLAN OF CARE
Goal Outcome Evaluation:  Plan of Care Reviewed With: patient        Progress: improving  Outcome Evaluation: Vital signs stable. A&Ox4. No c/o pain or nausea. Wound vac in place, Pt continues to not want wound vac at home. Ambulated to BR, standby assist. Small BM. Room air. Safety maintained. Plan of care ongoing.

## 2025-07-10 NOTE — OUTREACH NOTE
Prep Survey      Flowsheet Row Responses   [facility] facility patient discharged from? [place]   Is LACE score < 7 ? No   Eligibility [facility]   Date of Admission 07/08/25   Date of Discharge 07/10/25   Discharge Disposition Home-Health Care Sv   Discharge diagnosis Colon cancer-Ileostomy takedown, wound vac placement   Does the patient have one of the following disease processes/diagnoses(primary or secondary)? General Surgery   Does the patient have Home health ordered? Yes   What is the Home health agency?  [facility]   Is there a DME ordered? Yes   What DME was ordered? [employer] for wound vac   Prep survey completed? Yes            JOVITA A - Registered Nurse

## 2025-07-10 NOTE — PROGRESS NOTES
Start PACC Note    Home Health Referral    Evaluated patient and spouse on Home Care and services available. Patient offered choice of available HHC and agreeable to SN services with [facility].    Isolation Precautions: No active isolations    START PATIENT REGISTRATION INFORMATION  Order Information  Order Signing Physician: Marcos Rowe MD  Service Ordered RN?: Yes  Service Ordered PT?: No  Service Ordered OT?: No  Service Ordered ST?: No  Service Ordered MSW?: No  Service Ordered HHA?: No  Following Physician: Dr marcos Rowe  Following Physician Phone: [phone]  Overseeing Physician: Kori Patel DO  (Required for Residents Only)  Agreeable to Follow ? Yes  Date/Time of Call 07/10/25 16:23 EDT, Spoke with: per Dr Rowe's note in epic    Care Coordination  Same Day SOC?: No  Primary Care Physician: Kori Patel DO  Primary Care Physician Phone: [phone]  Primary Care Physician Address: [address] [place], [place] [address]  Visit Instructions: N/A  Service Discharge Location Type: Home  Service Facility Name: N/A  Service Floor Facility: N/A  Service Room No: N/A        Demographics  Patient Last Name: Thomas  Patient First Name: Jeanine  Language/Communication Barrier: none  Service Address: [address] [place] [address] City: [place]  Service State: [place]  Service Zip: [address]  Service Home Phone: [phone]  Other Phone Numbers:   Telephone Information:   Mobile [phone]     Emergency Contact:   Extended Emergency Contact Information  Primary Emergency Contact: KASH PAUL  Home Phone: [phone]  Mobile Phone: [phone]  Relation: Spouse    Admission Information  Admit Date: 7/8/2025  Patient Status at Discharge: Inpatient  Admitting Diagnosis: Malignant neoplasm of sigmoid colon [C18.7]  Rectal cancer [C20]    Caregiver Information  Caregiver First Name:   Caregiver Last Name:   Caregiver Relationship to Patient:   Caregiver Phone Number:   Caregiver Notes:      HITECH  Hi-Tech List  HIGHTECH: HI TECH - WOUND CARE  Orders: Daily wet to dry NS to old ileostomy site  Wound following physician: Dr Rowe  Last time wound care completed?: 7/10/25   Supplies sent home?: Yes  How many days are covered with supplies?: 4    Teachable Caregiver  Teachable Caregiver First Name: Bret   Teachable Caregiver Last Name: Thomas  Teachable Caregiver Relationship to Patient: spouse  Teachable Caregiver Phone Number: [phone]  Teachable Caregiver Notes: N/A  Teachable Caregiver available for Start of Care visit?: Yes  Teachable Caregiver agreeable to provide skilled High Tech care per physician's orders?: Yes      END PATIENT REGISTRATION INFORMATION        Start PACC Summary    Additional Comments: Call spouse cell to schedule visits.  Patient did not want the wound vac therefore Dr Rowe ordered daily NS wet to dry dressings.    END PACC Summary    Discharge Date: Pending    Referral Source: [facility]    Signed By: Sonia Brooke RN, 7/10/2025, 16:23 EDT     Date/Time: 07/10/25 16:23 EDT    End PACC Note

## 2025-07-10 NOTE — CASE MANAGEMENT/SOCIAL WORK
Case Management Discharge Note      Final Note: Home with spouse and [facility]. Refused wound vac and going home with Wet to Dry dressing changes. Called and notified Sonia/ANA of D/C and need for wet to dry dressing changes at home. Per Sonia/HIMANSHU, they will see tomorrow. Family transport    Provided Post Acute Provider List?: Refused  Provided Post Acute Provider Quality & Resource List?: Refused    Selected Continued Care - Admitted Since 7/8/2025       Destination    No services have been selected for the patient.                Durable Medical Equipment Coordination complete.      Service Provider Services Address Phone Fax Patient Preferred    [employer] [facility] [address] [id number] [phone] --              Dialysis/Infusion    No services have been selected for the patient.                Home Medical Care Coordination complete.      Service Provider Services Address Phone Fax Patient Preferred    [facility], [facility] [address], [address] [place], [place] [id number] [phone] --              Therapy    No services have been selected for the patient.                Community Resources    No services have been selected for the patient.                Community & DME    No services have been selected for the patient.                    Selected Continued Care - Prior Encounters Includes continued care and service providers with selected services from prior encounters from 4/9/2025 to 7/10/2025      Discharged on 5/16/2025 Admission date: 5/13/2025 - Discharge disposition: Home-Health Care [facility]      Home Medical Care       Service Provider Services Address Phone Fax Patient Preferred    [facility], Home Rehabilitation [address], [address] [place], [place] [address] [phone] --                          Transportation  Services  Transportation: Private Transportation  Private: Car    Final Discharge Disposition Code: 06 - home with home health care

## 2025-07-10 NOTE — PROGRESS NOTES
Progress Note    POD 2    S: Pt doing well today. She is tolerating a GI soft diet and had a small, loose bowel movement today. She denies any nausea, vomiting, or abdominal bloating.     O:  Temp:  [97.5 °F (36.4 °C)-98.3 °F (36.8 °C)] 97.8 °F (36.6 °C)  Heart Rate:  [50-61] 61  Resp:  [16-18] 16  BP: (108-123)/(48-53) 118/53      Intake/Output Summary (Last 24 hours) at 7/10/2025 0805  Last data filed at 7/10/2025 0728  Gross per 24 hour   Intake 0 ml   Output --   Net 0 ml       Abd: Soft, non-distended  Incision: Wound-vac in place over RLQ wound.     A/P: 70 y.o. female with s/p ILEOSTOMY TAKEDOWN, WOUND VAC PLACEMENT    - Afebrile, VSS  - Verified with Pt again this morning that she wishes to switch over to wet-to-dry dressings at discharge. Therefore, she will not need wound-vac from [facility].  - Anticipate D/C home this afternoon or tomorrow.

## 2025-07-10 NOTE — THERAPY TREATMENT NOTE
Acute Care - Physical Therapy Treatment Note  [facility]     Patient Name: Jeanine Guzman  : 1955  MRN: [id number]  Today's Date: 7/10/2025   Onset of Illness/Injury or Date of Surgery: 25  Visit Dx:     ICD-10-CM ICD-9-CM   1. Malignant neoplasm of sigmoid colon  C18.7 153.3     Patient Active Problem List   Diagnosis    Colitis    BRBPR (bright red blood per rectum)    Diarrhea    Rectal mass    Cancer of sigmoid    Colon cancer    Primary hypertension    Abnormal x-ray of pelvis    Abnormal findings on diagnostic imaging of other parts of digestive tract    Rectal cancer     Past Medical History:   Diagnosis Date    Anxiety     Arthritis     HANDS    Colon cancer 2025    Colon polyp 2022    Diverticulosis     GERD (gastroesophageal reflux disease)     GI (gastrointestinal bleed)     Glaucoma     Headache     History of chest pain 2025    CARDIAC EVAL NEGATIVE    History of Clostridium difficile infection     History of COVID-19     History of kidney stones     History of migraine     History of UTI     Hyperlipidemia 2022    Hypertension     Inflammatory bowel disease 25    Ischemic colitis    PONV (postoperative nausea and vomiting)     Rectal bleeding      Past Surgical History:   Procedure Laterality Date    APPENDECTOMY      COLON RESECTION N/A 2025    Procedure: COLON RESECTION LOW ANTERIOR LAPAROSCOPIC WITH DAVINCI ROBOT AND DIVERTING LOOP ILEOSTOMY;  Surgeon: Marcos Rowe MD;  Location: [facility] OR;  Service: Robotics - DaVinci;  Laterality: N/A;    COLONOSCOPY  22    COLONOSCOPY N/A 2025    Procedure: COLONOSCOPY TO CECUM WITH BIOPSY AND SPOT INK MARKER; POLYPECTOMY ( COLD SNARE);  Surgeon: Juan Jose Horner MD;  Location: [facility] ENDOSCOPY;  Service: Gastroenterology;  Laterality: N/A;  COLITIS; DIARRHEA  POST:HEMORRHOIDS; DIVERTICULOSIS; SIGMOID MASS; COLITIS; COLON POLYP    EYE SURGERY  2019    Cataracts     FRACTURE SURGERY  2010    Broken wrist    HYSTERECTOMY      Partial- 30 plus years ago    ILEOSTOMY CLOSURE N/A 7/8/2025    Procedure: ILEOSTOMY TAKEDOWN, WOUND VAC PLACEMENT;  Surgeon: Marcos Riavs MD;  Location: [facility] OR;  Service: General;  Laterality: N/A;    SIGMOIDOSCOPY N/A 07/03/2025    PATENT AND HEALTHY ANASTAMOSIS IN MID RECTUM, RESCOPE IN 8 MONTHS, DR. MARCOS RIVAS AT [facility]     PT Assessment (Last 12 Hours)       PT Evaluation and Treatment       Row Name 07/10/25 1000          Physical Therapy Time and Intention    Subjective Information no complaints  -     Document Type discharge treatment  -     Mode of Treatment individual therapy;physical therapy  -     Patient Effort excellent  -       Row Name 07/10/25 1000          General Information    Patient Profile Reviewed yes  -     Patient Observations alert;cooperative;agree to therapy  -     General Observations of Patient pt seated EOB no acute distress  -     Prior Level of Function independent:  -     Existing Precautions/Restrictions fall  wound vac  -     Barriers to Rehab none identified  -[facility] Name 07/10/25 1000          Living Environment    Current Living Arrangements home  -[facility] Name 07/10/25 1000          Pain    Pretreatment Pain Rating 0/10 - no pain  -     Posttreatment Pain Rating 0/10 - no pain  -       Row Name 07/10/25 1000          Cognition    Affect/Mental Status (Cognition) WFL  -       Row Name 07/10/25 1000          Bed Mobility    Supine-Sit [place] (Bed Mobility) modified independence  -     Sit-Supine [place] (Bed Mobility) modified independence  -     Assistive Device (Bed Mobility) bed rails  -       Row Name 07/10/25 1000          Sit-Stand Transfer    Sit-Stand [place] (Transfers) supervision  -       Row Name 07/10/25 1000          Stand-Sit Transfer    Stand-Sit [place]an (Transfers) supervision  -[facility] Name 07/10/25 1000           Gait/Stairs (Locomotion)    [place] Level (Gait) supervision  -     Distance in Feet (Gait) 150  -LH     Pattern (Gait) step-through  -     Comment, (Gait/Stairs) assist for wound vac management  -LH       Row Name 07/10/25 1000          Balance    Static Sitting Balance independent  -LH     Position, Sitting Balance unsupported;sitting edge of bed  -     Static Standing Balance supervision  -     Position/Device Used, Standing Balance supported  -       Row Name             Wound 07/08/25 0951 Right lateral abdomen Surgical Open Surgical Incision    Wound - Properties Group Placement Date: 07/08/25  -CB Placement Time: 0951  -CB Present on Original Admission: Y  -CB Side: Right  -CB Orientation: lateral  -CB Location: abdomen  -CB Primary Wound Type: Surgical  -CB Secondary Wound Type - Surgical: Open Surg  -CB Additional Comments: WOUND VAC PLACEMENT  -CB    Retired Wound - Properties Group Placement Date: 07/08/25  -CB Placement Time: 0951  -CB Present on Original Admission: Y  -CB Side: Right  -CB Orientation: lateral  -CB Location: abdomen  -CB Additional Comments: WOUND VAC PLACEMENT  -CB    Retired Wound - Properties Group Placement Date: 07/08/25  -CB Placement Time: 0951  -CB Present on Original Admission: Y  -CB Side: Right  -CB Orientation: lateral  -CB Location: abdomen  -CB Additional Comments: WOUND VAC PLACEMENT  -CB    Retired Wound - Properties Group Date first assessed: 07/08/25  -CB Time first assessed: 0951  -CB Present on Original Admission: Y  -CB Side: Right  -CB Location: abdomen  -CB Additional Comments: WOUND VAC PLACEMENT  -CB      Row Name             NPWT (Negative Pressure Wound Therapy) 07/08/25 1044 RIGHT ABDOMEN    NPWT (Negative Pressure Wound Therapy) - Properties Group Placement Date: 07/08/25  -CB Placement Time: 1044  -CB Location: RIGHT ABDOMEN  -CB    Retired NPWT (Negative Pressure Wound Therapy) - Properties Group Placement Date: 07/08/25  -CB Placement Time:  1044  -CB Location: RIGHT ABDOMEN  -CB    Retired NPWT (Negative Pressure Wound Therapy) - Properties Group Placement Date: 07/08/25  -CB Placement Time: 1044 -CB Location: RIGHT ABDOMEN  -CB    Retired NPWT (Negative Pressure Wound Therapy) - Properties Group Placement Date: 07/08/25  -CB Placement Time: 1044  -CB Location: RIGHT ABDOMEN  -CB      Row Name 07/10/25 1000          Plan of Care Review    Plan of Care Reviewed With patient  -     Progress improving  -     Outcome Evaluation Pt improving, increased gait distance this date pt ambulated 150ft supervision, PT assisted w carrying wound vac. Pt has met all PT goals. encouraged continued ambulation w staff/family. will sign off acutely. anticipate DC home w family support.  -       Row Name 07/10/25 1000          Positioning and Restraints    Pre-Treatment Position in bed  -     Post Treatment Position chair  -     In Chair reclined;encouraged to call for assist;call light within reach;exit alarm on;notified nsg  -       Row Name 07/10/25 1000          Gait Training Goal 1 (PT)    Progress/Outcome (Gait Training Goal 1, PT) goal met  -               User Key  (r) = Recorded By, (t) = Taken By, (c) = Cosigned By      Initials Name Provider Type     Toma Conti, PT Physical Therapist    Kami Quiñonez, RN Registered Nurse                    Physical Therapy Education       Title: PT OT SLP Therapies (In Progress)       Topic: Physical Therapy (In Progress)       Point: Mobility training (In Progress)       Learning Progress Summary            Patient Acceptance, E, NR by  at 7/10/2025 1047    Acceptance, E, VU,DU,NR by  at 7/10/2025 1045    Acceptance, E, NR by  at 7/9/2025 1242                      Point: Home exercise program (In Progress)       Learning Progress Summary            Patient Acceptance, E, NR by  at 7/10/2025 1047    Acceptance, E, VU,DU,NR by  at 7/10/2025 1045    Acceptance, E, NR by  at 7/9/2025 1242                       Point: Body mechanics (In Progress)       Learning Progress Summary            Patient Acceptance, E, NR by  at 7/10/2025 1047    Acceptance, E, VU,DU,NR by  at 7/10/2025 1045    Acceptance, E, NR by  at 7/9/2025 1242                      Point: Precautions (In Progress)       Learning Progress Summary            Patient Acceptance, E, NR by  at 7/10/2025 1047    Acceptance, E, VU,DU,NR by  at 7/10/2025 1045    Acceptance, E, NR by  at 7/9/2025 1242                                      User Key       Initials Effective Dates Name Provider Type Discipline     06/16/21 -  Toma Conti, PT Physical Therapist PT                  PT Recommendation and Plan  Anticipated Discharge Disposition (PT): home with home health, home with assist  Planned Therapy Interventions (PT): balance training, gait training, bed mobility training, home exercise program, ROM (range of motion), stair training, strengthening, stretching, transfer training  Therapy Frequency (PT): 5 times/wk  Plan of Care Reviewed With: patient  Progress: improving  Outcome Evaluation: Pt improving, increased gait distance this date pt ambulated 150ft supervision, PT assisted w carrying wound vac. Pt has met all PT goals. encouraged continued ambulation w staff/family. will sign off acutely. anticipate DC home w family support.   Outcome Measures       Row Name 07/10/25 1000 07/09/25 1200          How much help from another person do you currently need...    Turning from your back to your side while in flat bed without using bedrails? 4  - 3  -LH     Moving from lying on back to sitting on the side of a flat bed without bedrails? 4  - 3  -LH     Moving to and from a bed to a chair (including a wheelchair)? 4  - 3  -LH     Standing up from a chair using your arms (e.g., wheelchair, bedside chair)? 4  - 3  -     Climbing 3-5 steps with a railing? 3  - 3  -LH     To walk in hospital room? 3  - 3  -     AM-PAC 6 Clicks  Score (PT) 22  - 18  -               User Key  (r) = Recorded By, (t) = Taken By, (c) = Cosigned By      Initials Name Provider Type     Toma Conti PT Physical Therapist                     Time Calculation:    PT Charges       Row Name 07/10/25 1046             Time Calculation    Start Time 1015  -      Stop Time 1025  -      Time Calculation (min) 10 min  -      PT Received On 07/10/25  -                User Key  (r) = Recorded By, (t) = Taken By, (c) = Cosigned By      Initials Name Provider Type     Toma Conti, PT Physical Therapist                  Therapy Charges for Today       Code Description Service Date Service Provider Modifiers Qty    92281358922 HC PT EVAL MOD COMPLEXITY 3 7/9/2025 Toma Conti, PT GP 1    87924342823 HC PT THER PROC EA 15 MIN 7/9/2025 Toma Conti, PT GP 1    37425852855 HC PT THERAPEUTIC ACT EA 15 MIN 7/10/2025 Toma Conti, PT GP 1            PT G-Codes  AM-PAC 6 Clicks Score (PT): 22    Toma Conti, PT  7/10/2025

## 2025-07-10 NOTE — PLAN OF CARE
Goal Outcome Evaluation:  Plan of Care Reviewed With: patient        Progress: improving  Patient discharged home. Wound vac removed, NS wet to dry dressing applied. Educated patient and spouse on performing dressing changes daily. Patient sent home with dressing change supplied, home health to follow up.

## 2025-07-10 NOTE — PROGRESS NOTES
CRS attending  Patient tolerating diet  Does not want wound VAC  Having bowel movements    Vital signs stable  Abdomen soft    Status post ileostomy takedown  Okay for discharge

## 2025-07-11 ENCOUNTER — TRANSITIONAL CARE MANAGEMENT TELEPHONE ENCOUNTER (OUTPATIENT)
Dept: CALL CENTER | Facility: HOSPITAL | Age: 70
End: 2025-07-11
Payer: MEDICARE

## 2025-07-11 DIAGNOSIS — C18.7 MALIGNANT NEOPLASM OF SIGMOID COLON: ICD-10-CM

## 2025-07-11 RX ORDER — GABAPENTIN 300 MG/1
300 CAPSULE ORAL AS NEEDED
Qty: 20 CAPSULE | Refills: 0 | Status: SHIPPED | OUTPATIENT
Start: 2025-07-11 | End: 2025-07-21

## 2025-07-11 NOTE — OUTREACH NOTE
Call Center TCM Note      Flowsheet Row Responses   [facility] patient discharged from? [place]   Does the patient have one of the following disease processes/diagnoses(primary or secondary)? General Surgery   TCM attempt successful? Yes   Call start time 0956   Call end time 1014   Discharge diagnosis Colon cancer-Ileostomy takedown, wound vac placement   Person spoke with today (if not patient) and relationship pt   Meds reviewed with patient/caregiver? Yes   Is the patient having any side effects they believe may be caused by any medication additions or changes? No   Does the patient have all medications related to this admission filled (includes all antibiotics, pain medications, etc.) Yes   Is the patient taking all medications as directed (includes completed medication regime)? Yes   Comments Post-op 7/28/25   Does the patient have an appointment with their PCP within 7-14 days of discharge? Yes  [7/15/2025  1:30 PM]   What is the Home health agency?  [facility]   Has home health visited the patient within 72 hours of discharge? Call prior to 72 hours   Psychosocial issues? No   Did the patient receive a copy of their discharge instructions? Yes   Nursing interventions Reviewed instructions with patient   What is the patient's perception of their health status since discharge? Improving   Nursing interventions Nurse provided patient education   Is the patient /caregiver able to teach back basic post-op care? Take showers only when approved by MD-sponge bathe until then, No tub bath, swimming, or hot tub until instructed by MD, Keep incision areas clean,dry and protected, Lifting as instructed by MD in discharge instructions   Is the patient/caregiver able to teach back signs and symptoms of incisional infection? Increased redness, swelling or pain at the incisonal site, Increased drainage or bleeding, Incisional warmth, Pus or odor from incision, Fever   Is the patient/caregiver able to teach back steps  to recovery at home? Rest and rebuild strength, gradually increase activity, Eat a well-balance diet   If the patient is a current smoker, are they able to teach back resources for cessation? Not a smoker   Is the patient/caregiver able to teach back the hierarchy of who to call/visit for symptoms/problems? PCP, Specialist, Home health nurse, Urgent Care, ED, 911 Yes   TCM call completed? Yes   Wrap up additional comments Pt reports some soreness at surgical site, and denies any increased redness, drainage from open wound. Pt does report some swelling around wound area that has not increased. Pt is doing wet to dry dsg to RLQ, and does not have the wound vac. Educated pt on the importance of not getting this area wet r/t showers. HH visits. Reviewed AVS/meds/instructions with pt. Pt verified post-op appt. Pt does feel like she is getting a UTI and advised to take trimethoprim as she has not been taking. Pt advised to drink plenty of fluids. Pt does see PCP this tuesday 7/15/25 and will send message to PCP office to see if they can send an order to HH to get a urine sample to have checked for UTI or have pt seen today for possible UTI. Pt verified  PCP, post-op appts   Call end time 1014   Would this patient benefit from a Referral to [facility] Social Work? No   Is the patient interested in additional calls from an ambulatory ? No            Yvrose BECKER - Registered Nurse    7/11/2025, 10:22 EDT

## 2025-07-14 ENCOUNTER — TELEPHONE (OUTPATIENT)
Dept: SURGERY | Facility: CLINIC | Age: 70
End: 2025-07-14
Payer: MEDICARE

## 2025-07-14 NOTE — TELEPHONE ENCOUNTER
Stacey with [facility] called, the Pt decided she wants a wound vac and orders have to be placed.     [facility]- Iris if any questions.   [phone]

## 2025-07-14 NOTE — TELEPHONE ENCOUNTER
Lvm with mirna advising. [employer] form was faxed to [facility] with as much information as possible but we do not have pts wound measurements.

## 2025-07-14 NOTE — TELEPHONE ENCOUNTER
We should only have to give a verbal order to home health, they should fill out the application to [employer] as they are following the patient and have all of the wound measurements. We do not have that information and [employer] requires info about the wound, that Home health has documented.

## 2025-07-15 ENCOUNTER — TELEPHONE (OUTPATIENT)
Dept: FAMILY MEDICINE CLINIC | Facility: CLINIC | Age: 70
End: 2025-07-15

## 2025-07-15 ENCOUNTER — OFFICE VISIT (OUTPATIENT)
Dept: FAMILY MEDICINE CLINIC | Facility: CLINIC | Age: 70
End: 2025-07-15
Payer: MEDICARE

## 2025-07-15 VITALS
BODY MASS INDEX: 26.21 KG/M2 | HEART RATE: 70 BPM | DIASTOLIC BLOOD PRESSURE: 74 MMHG | SYSTOLIC BLOOD PRESSURE: 136 MMHG | WEIGHT: 138.8 LBS | HEIGHT: 61 IN | OXYGEN SATURATION: 97 %

## 2025-07-15 DIAGNOSIS — R30.0 DYSURIA: ICD-10-CM

## 2025-07-15 DIAGNOSIS — Z09 HOSPITAL DISCHARGE FOLLOW-UP: Primary | ICD-10-CM

## 2025-07-15 DIAGNOSIS — C18.7 ADENOCARCINOMA OF SIGMOID COLON: ICD-10-CM

## 2025-07-15 LAB
BILIRUB BLD-MCNC: NEGATIVE MG/DL
CLARITY, POC: CLEAR
COLOR UR: YELLOW
EXPIRATION DATE: ABNORMAL
GLUCOSE UR STRIP-MCNC: NEGATIVE MG/DL
KETONES UR QL: NEGATIVE
LEUKOCYTE EST, POC: NEGATIVE
Lab: ABNORMAL
NITRITE UR-MCNC: NEGATIVE MG/ML
PH UR: 6 [PH] (ref 5–8)
PROT UR STRIP-MCNC: ABNORMAL MG/DL
RBC # UR STRIP: NEGATIVE /UL
SP GR UR: 1.03 (ref 1–1.03)
UROBILINOGEN UR QL: NORMAL

## 2025-07-15 RX ORDER — TRIMETHOPRIM 100 MG/1
100 TABLET ORAL DAILY
Qty: 30 TABLET | Refills: 6 | Status: SHIPPED | OUTPATIENT
Start: 2025-07-15

## 2025-07-15 NOTE — PROGRESS NOTES
"Transitional Care Follow Up Visit  Subjective     Jeanine Guzman is a 70 y.o. female who presents for a transitional care management visit.    Within 48 business hours after discharge our office contacted her via telephone to coordinate her care and needs.      I reviewed and discussed the details of that call along with the discharge summary, hospital problems, inpatient lab results, inpatient diagnostic studies, and consultation reports with Jeanine.     Current outpatient and discharge medications have been reconciled for the patient.  Reviewed by: Kori Patel DO          7/10/2025     5:43 PM   Date of TCM Phone Call   [facility]   Date of Admission 7/8/2025   Date of Discharge 7/10/2025   [place] [facility] [place]     Risk for Readmission (LACE) Score: 9 (7/10/2025  6:00 AM)      History of Present Illness   Course During Hospital Stay: Per discharge summary: \"Patient is a 70 y.o. female is S/P ileostomy takedown with wound-vac placement 07/08/2025. She was started on a clear liquid diet following surgery. This was gradually advanced to a regular diet. Pt is tolerating PO intake and having bowel function. Pain well controlled with ERAS. Pt is comfortable being D/C home today, but elects to transition to wet-to-dry dressing changes upon discharge.\"      The following portions of the patient's history were reviewed and updated as appropriate: allergies, current medications, past family history, past medical history, past social history, past surgical history, and problem list.    Review of Systems   Constitutional:  Negative for activity change, appetite change, fatigue and unexpected weight change.   HENT:  Negative for congestion, ear pain, nosebleeds, sore throat and tinnitus.    Eyes:  Negative for pain, redness and visual disturbance.   Respiratory:  Negative for cough, shortness of breath and wheezing.    Cardiovascular:  Negative for chest pain, palpitations and leg " "swelling.   Gastrointestinal:  Negative for abdominal pain, blood in stool and nausea.   Endocrine: Negative for polydipsia and polyuria.   Genitourinary:  Negative for dysuria, frequency, menstrual problem and vaginal discharge.   Musculoskeletal:  Negative for arthralgias, joint swelling and myalgias.   Skin:  Negative for rash.   Allergic/Immunologic: Negative for environmental allergies, food allergies and immunocompromised state.   Neurological:  Negative for dizziness, speech difficulty, weakness and headaches.   Hematological:  Negative for adenopathy. Does not bruise/bleed easily.   Psychiatric/Behavioral:  Negative for decreased concentration and dysphoric mood. The patient is not nervous/anxious.        Objective   /74   Pulse 70   Ht 154.9 cm (61\")   Wt 63 kg (138 lb 12.8 oz)   SpO2 97%   BMI 26.23 kg/m²   Physical Exam  Vitals and nursing note reviewed.   Constitutional:       Appearance: She is well-developed.   HENT:      Head: Normocephalic and atraumatic.   Eyes:      Conjunctiva/sclera: Conjunctivae normal.   Cardiovascular:      Rate and Rhythm: Normal rate and regular rhythm.      Heart sounds: Normal heart sounds.   Pulmonary:      Effort: Pulmonary effort is normal.      Breath sounds: Normal breath sounds.   Abdominal:      General: Bowel sounds are normal.      Palpations: Abdomen is soft.   Musculoskeletal:         General: Normal range of motion.      Cervical back: Normal range of motion and neck supple.   Skin:     General: Skin is warm and dry.      Capillary Refill: Capillary refill takes less than 2 seconds.      Findings: No rash.   Neurological:      Mental Status: She is alert and oriented to person, place, and time.   Psychiatric:         Mood and Affect: Mood normal.         Behavior: Behavior normal.         Thought Content: Thought content normal.         Judgment: Judgment normal.         Assessment & Plan   Diagnoses and all orders for this visit:    1. Hospital " discharge follow-up (Primary)    2. Adenocarcinoma of sigmoid colon    3. Dysuria  -     POC Urinalysis Dipstick, Automated    Other orders  -     trimethoprim (TRIMPEX) 100 MG tablet; Take 1 tablet by mouth Daily.  Dispense: 30 tablet; Refill: 6      Patient is here today after her recent ostomy takedown after sigmoidectomy for colon cancer.  While in the hospital recovering she developed some dysuria from the urinary catheter.  When she got home she restarted trimethoprim and has not had any symptoms since.  She denies any fevers or chills or frequency of urination.  She is doing well overall.  Urinalysis shows a trace protein, no signs of infection.   She has home health coming and they are dressing her abdominal wound.  She is likely to start a wound VAC soon to help with healing.  All vitals are good today.  Keep routine follow-up as planned.

## 2025-07-15 NOTE — TELEPHONE ENCOUNTER
PHARMACY SENT A FAX STATING THAT THE COPAY FOR THE TRIMPEX IS $53 AND THAT IS TOO HIGH FOR PT AT THIS TIME.  PLEASE SEND ALTERNATIVE

## 2025-07-22 ENCOUNTER — READMISSION MANAGEMENT (OUTPATIENT)
Dept: CALL CENTER | Facility: HOSPITAL | Age: 70
End: 2025-07-22
Payer: MEDICARE

## 2025-07-22 NOTE — OUTREACH NOTE
Medical Week 2 Survey      Flowsheet Row Responses   [facility] facility patient discharged from? [place]   Does the patient have one of the following disease processes/diagnoses(primary or secondary)? General Surgery   Revoke Eva BECKER - Registered Nurse

## 2025-07-23 ENCOUNTER — TELEPHONE (OUTPATIENT)
Dept: SURGERY | Facility: CLINIC | Age: 70
End: 2025-07-23
Payer: MEDICARE

## 2025-07-23 NOTE — TELEPHONE ENCOUNTER
[facility] called and said they received an order for a wound vac and they spoke with home health, the home health nurse told them the wound no longer needed a vac and they were going to cancel the order. Gave a verbal order to D/C wound vac.

## 2025-07-28 ENCOUNTER — OFFICE VISIT (OUTPATIENT)
Dept: SURGERY | Facility: CLINIC | Age: 70
End: 2025-07-28
Payer: MEDICARE

## 2025-07-28 VITALS
DIASTOLIC BLOOD PRESSURE: 70 MMHG | WEIGHT: 137.6 LBS | SYSTOLIC BLOOD PRESSURE: 138 MMHG | HEIGHT: 60 IN | HEART RATE: 84 BPM | BODY MASS INDEX: 27.01 KG/M2 | OXYGEN SATURATION: 97 %

## 2025-07-28 DIAGNOSIS — C18.7 MALIGNANT NEOPLASM OF SIGMOID COLON: Primary | ICD-10-CM

## 2025-07-28 PROCEDURE — 1159F MED LIST DOCD IN RCRD: CPT | Performed by: PHYSICIAN ASSISTANT

## 2025-07-28 PROCEDURE — 1160F RVW MEDS BY RX/DR IN RCRD: CPT | Performed by: PHYSICIAN ASSISTANT

## 2025-07-28 PROCEDURE — 99024 POSTOP FOLLOW-UP VISIT: CPT | Performed by: PHYSICIAN ASSISTANT

## 2025-07-28 PROCEDURE — 3075F SYST BP GE 130 - 139MM HG: CPT | Performed by: PHYSICIAN ASSISTANT

## 2025-07-28 PROCEDURE — 3078F DIAST BP <80 MM HG: CPT | Performed by: PHYSICIAN ASSISTANT

## 2025-07-28 NOTE — PROGRESS NOTES
"History of Present Illness  The patient is a 70-year-old female presenting for a postoperative evaluation following an ileostomy takedown on 07/08/2025. She also underwent a robot-assisted laparoscopic low anterior colon resection with diverting loop ileostomy on 05/13/2025 for T3N0 sigmoid colon adenocarcinoma.    She reports difficulty in obtaining her medical supplies, which were ordered last Tuesday. Her home health nurse has been monitoring her condition and has not identified any need for additional interventions. She has been managing her wound care independently, with the exception of vital sign checks performed by her home health nurse. She has been experiencing frequent bowel movements, up to seven times a day, and is considering the use of fiber pills to manage this. Her energy levels fluctuate, but she feels capable of resuming her usual activities, including light housework such as mopping and vacuuming.       Answers submitted by the patient for this visit:  Post Operative Visit (Submitted on 7/25/2025)  Chief Complaint: Follow-up  Pain Control: well controlled  Fever: no fever  Diet: adequate intake  Activity: normal  Operative Site Issues: No  Additional information: Healing with wet/dry dressing    /70 (BP Location: Left arm, Patient Position: Sitting, Cuff Size: Adult)   Pulse 84   Ht 152.4 cm (60\")   Wt 62.4 kg (137 lb 9.6 oz)   LMP  (LMP Unknown)   SpO2 97%   Breastfeeding No   BMI 26.87 kg/m²   Body mass index is 26.87 kg/m².  -  Physical Exam  No acute distress  Chaperone present  Abdomen: Right lower quadrant wound measures approximately 3 cm in length x 2 cm in width x 1 cm in depth with healthy pink granulation and no surrounding erythema, no drainage or exudate     Assessment & Plan  1. Postoperative status following ileostomy takedown.  Her colon is in the process of adapting to its new state, which may result in certain foods causing more rapid transit than others. This " should improve over time. She was advised to consume small meals and gradually introduce fiber supplements into her diet, starting with one pill daily for a week, then increasing to two pills if tolerated. She was also advised to avoid heavy lifting and strenuous activities.  She is advised to contact her home health nurse regarding the ordering of wound care supplies and whether or not the wound nurse needs to continue to visit her home.  If necessary, she can take Imodium, but no more than one tablet within a 24 to 48-hour period to start. She was also advised to avoid carbonated drinks for the time being.    Follow-up  A follow-up appointment is scheduled for 2 weeks from now for wound check.       Patient or patient representative verbalized consent for the use of Ambient Listening during the visit with  Rosalia Mario PA-C for chart documentation. 7/28/2025  10:10 EDT    Rosalia Mario PA-C  Physician Assistant  Colorectal Surgery

## 2025-08-13 ENCOUNTER — OFFICE VISIT (OUTPATIENT)
Dept: SURGERY | Facility: CLINIC | Age: 70
End: 2025-08-13
Payer: MEDICARE

## 2025-08-13 VITALS
WEIGHT: 140 LBS | HEIGHT: 60 IN | DIASTOLIC BLOOD PRESSURE: 70 MMHG | HEART RATE: 92 BPM | SYSTOLIC BLOOD PRESSURE: 154 MMHG | BODY MASS INDEX: 27.48 KG/M2 | OXYGEN SATURATION: 97 %

## 2025-08-13 DIAGNOSIS — C18.7 MALIGNANT NEOPLASM OF SIGMOID COLON: Primary | ICD-10-CM

## 2025-08-13 PROCEDURE — 1159F MED LIST DOCD IN RCRD: CPT | Performed by: PHYSICIAN ASSISTANT

## 2025-08-13 PROCEDURE — 3078F DIAST BP <80 MM HG: CPT | Performed by: PHYSICIAN ASSISTANT

## 2025-08-13 PROCEDURE — 3077F SYST BP >= 140 MM HG: CPT | Performed by: PHYSICIAN ASSISTANT

## 2025-08-13 PROCEDURE — 1160F RVW MEDS BY RX/DR IN RCRD: CPT | Performed by: PHYSICIAN ASSISTANT

## 2025-08-13 PROCEDURE — 99024 POSTOP FOLLOW-UP VISIT: CPT | Performed by: PHYSICIAN ASSISTANT

## 2025-08-25 ENCOUNTER — OFFICE VISIT (OUTPATIENT)
Dept: SURGERY | Facility: CLINIC | Age: 70
End: 2025-08-25
Payer: MEDICARE

## 2025-08-25 VITALS
WEIGHT: 139 LBS | OXYGEN SATURATION: 97 % | DIASTOLIC BLOOD PRESSURE: 80 MMHG | BODY MASS INDEX: 27.29 KG/M2 | SYSTOLIC BLOOD PRESSURE: 156 MMHG | HEART RATE: 97 BPM | HEIGHT: 60 IN

## 2025-08-25 DIAGNOSIS — C18.7 MALIGNANT NEOPLASM OF SIGMOID COLON: Primary | ICD-10-CM

## 2025-08-25 PROCEDURE — 3077F SYST BP >= 140 MM HG: CPT | Performed by: PHYSICIAN ASSISTANT

## 2025-08-25 PROCEDURE — 3079F DIAST BP 80-89 MM HG: CPT | Performed by: PHYSICIAN ASSISTANT

## 2025-08-25 PROCEDURE — 1159F MED LIST DOCD IN RCRD: CPT | Performed by: PHYSICIAN ASSISTANT

## 2025-08-25 PROCEDURE — 1160F RVW MEDS BY RX/DR IN RCRD: CPT | Performed by: PHYSICIAN ASSISTANT

## 2025-08-25 PROCEDURE — 99024 POSTOP FOLLOW-UP VISIT: CPT | Performed by: PHYSICIAN ASSISTANT

## 2025-08-28 ENCOUNTER — OFFICE VISIT (OUTPATIENT)
Dept: FAMILY MEDICINE CLINIC | Facility: CLINIC | Age: 70
End: 2025-08-28
Payer: MEDICARE

## 2025-08-28 VITALS
SYSTOLIC BLOOD PRESSURE: 123 MMHG | OXYGEN SATURATION: 97 % | DIASTOLIC BLOOD PRESSURE: 67 MMHG | BODY MASS INDEX: 27.52 KG/M2 | WEIGHT: 140.2 LBS | HEART RATE: 64 BPM | HEIGHT: 60 IN

## 2025-08-28 DIAGNOSIS — Z00.00 MEDICARE ANNUAL WELLNESS VISIT, SUBSEQUENT: Primary | ICD-10-CM

## 2025-08-28 DIAGNOSIS — E78.2 MIXED HYPERLIPIDEMIA: ICD-10-CM

## 2025-08-28 DIAGNOSIS — Z53.20 REFUSAL OF STATIN MEDICATION BY PATIENT: ICD-10-CM

## 2025-08-28 DIAGNOSIS — Z79.899 ENCOUNTER FOR LONG-TERM (CURRENT) USE OF MEDICATIONS: ICD-10-CM

## 2025-08-28 DIAGNOSIS — K21.9 GASTROESOPHAGEAL REFLUX DISEASE WITHOUT ESOPHAGITIS: ICD-10-CM

## 2025-08-28 DIAGNOSIS — I10 PRIMARY HYPERTENSION: ICD-10-CM

## 2025-08-28 LAB
ALBUMIN SERPL-MCNC: 4.6 G/DL (ref 3.5–5.2)
ALBUMIN/GLOB SERPL: 1.8 G/DL
ALP SERPL-CCNC: 88 U/L (ref 39–117)
ALT SERPL-CCNC: 12 U/L (ref 1–33)
AST SERPL-CCNC: 13 U/L (ref 1–32)
BASOPHILS # BLD AUTO: 0.04 10*3/MM3 (ref 0–0.2)
BASOPHILS NFR BLD AUTO: 0.7 % (ref 0–1.5)
BILIRUB SERPL-MCNC: 0.5 MG/DL (ref 0–1.2)
BUN SERPL-MCNC: 16 MG/DL (ref 8–23)
BUN/CREAT SERPL: 22.2 (ref 7–25)
CALCIUM SERPL-MCNC: 10 MG/DL (ref 8.6–10.5)
CHLORIDE SERPL-SCNC: 105 MMOL/L (ref 98–107)
CHOLEST SERPL-MCNC: 268 MG/DL (ref 0–200)
CO2 SERPL-SCNC: 23.6 MMOL/L (ref 22–29)
CREAT SERPL-MCNC: 0.72 MG/DL (ref 0.57–1)
EGFRCR SERPLBLD CKD-EPI 2021: 90.1 ML/MIN/1.73
EOSINOPHIL # BLD AUTO: 0.69 10*3/MM3 (ref 0–0.4)
EOSINOPHIL NFR BLD AUTO: 11.4 % (ref 0.3–6.2)
ERYTHROCYTE [DISTWIDTH] IN BLOOD BY AUTOMATED COUNT: 12.7 % (ref 12.3–15.4)
GLOBULIN SER CALC-MCNC: 2.5 GM/DL
GLUCOSE SERPL-MCNC: 88 MG/DL (ref 65–99)
HCT VFR BLD AUTO: 39.2 % (ref 34–46.6)
HDLC SERPL-MCNC: 61 MG/DL (ref 40–60)
HGB BLD-MCNC: 12.7 G/DL (ref 12–15.9)
IMM GRANULOCYTES # BLD AUTO: 0.01 10*3/MM3 (ref 0–0.05)
IMM GRANULOCYTES NFR BLD AUTO: 0.2 % (ref 0–0.5)
LDLC SERPL CALC-MCNC: 179 MG/DL (ref 0–100)
LYMPHOCYTES # BLD AUTO: 1.79 10*3/MM3 (ref 0.7–3.1)
LYMPHOCYTES NFR BLD AUTO: 29.6 % (ref 19.6–45.3)
MCH RBC QN AUTO: 28 PG (ref 26.6–33)
MCHC RBC AUTO-ENTMCNC: 32.4 G/DL (ref 31.5–35.7)
MCV RBC AUTO: 86.3 FL (ref 79–97)
MONOCYTES # BLD AUTO: 0.61 10*3/MM3 (ref 0.1–0.9)
MONOCYTES NFR BLD AUTO: 10.1 % (ref 5–12)
NEUTROPHILS # BLD AUTO: 2.9 10*3/MM3 (ref 1.7–7)
NEUTROPHILS NFR BLD AUTO: 48 % (ref 42.7–76)
NRBC BLD AUTO-RTO: 0 /100 WBC (ref 0–0.2)
PLATELET # BLD AUTO: 407 10*3/MM3 (ref 140–450)
POTASSIUM SERPL-SCNC: 4.9 MMOL/L (ref 3.5–5.2)
PROT SERPL-MCNC: 7.1 G/DL (ref 6–8.5)
RBC # BLD AUTO: 4.54 10*6/MM3 (ref 3.77–5.28)
SODIUM SERPL-SCNC: 141 MMOL/L (ref 136–145)
TRIGL SERPL-MCNC: 156 MG/DL (ref 0–150)
TSH SERPL DL<=0.005 MIU/L-ACNC: 2.15 UIU/ML (ref 0.27–4.2)
VLDLC SERPL CALC-MCNC: 28 MG/DL (ref 5–40)
WBC # BLD AUTO: 6.04 10*3/MM3 (ref 3.4–10.8)

## 2025-08-28 RX ORDER — BENAZEPRIL HYDROCHLORIDE 40 MG/1
40 TABLET ORAL DAILY
Qty: 90 TABLET | Refills: 1 | Status: SHIPPED | OUTPATIENT
Start: 2025-08-28

## (undated) DEVICE — CANN O2 ETCO2 FITS ALL CONN CO2 SMPL A/ 7IN DISP LF

## (undated) DEVICE — PATIENT RETURN ELECTRODE, SINGLE-USE, CONTACT QUALITY MONITORING, ADULT, WITH 9FT CORD, FOR PATIENTS WEIGING OVER 33LBS. (15KG): Brand: MEGADYNE

## (undated) DEVICE — SOL NACL 0.9PCT 1000ML

## (undated) DEVICE — DRAPE,UTILITY,TAPE,15X26,STERILE: Brand: MEDLINE

## (undated) DEVICE — VIOLET BRAIDED (POLYGLACTIN 910), SYNTHETIC ABSORBABLE SUTURE: Brand: COATED VICRYL

## (undated) DEVICE — SINGLE-USE BIOPSY FORCEPS: Brand: RADIAL JAW 4

## (undated) DEVICE — SEAL

## (undated) DEVICE — PREP SOL POVIDONE/IODINE BT 4OZ

## (undated) DEVICE — SUT VIC 0 TIES 18IN J912G

## (undated) DEVICE — 1000ML,PRESSURE INFUSER W/STOPCOCK: Brand: MEDLINE

## (undated) DEVICE — TIP COVER ACCESSORY

## (undated) DEVICE — APPL CHLORAPREP HI/LITE 26ML ORNG

## (undated) DEVICE — ST TBG AIRSEAL FLTR TRI LUM

## (undated) DEVICE — SUREFORM 45: Brand: SUREFORM

## (undated) DEVICE — HYPODERMIC SAFETY NEEDLE: Brand: MONOJECT

## (undated) DEVICE — KT ORCA ORCAPOD DISP STRL

## (undated) DEVICE — 1LYRTR 16FR10ML100%SILTMPS SNP: Brand: MEDLINE INDUSTRIES, INC.

## (undated) DEVICE — BARRIER, ABSORBABLE, ADHESION: Brand: SEPRAFILM®

## (undated) DEVICE — TROC BLADLES ANCHORPORT/OPTI LP 8X120MM 1P/U

## (undated) DEVICE — COVER,MAYO STAND,STERILE: Brand: MEDLINE

## (undated) DEVICE — STERILE LATEX POWDER-FREE SURGICAL GLOVESWITH NITRILE COATING: Brand: PROTEXIS

## (undated) DEVICE — THE CARR-LOCKE INJECTION NEEDLE IS A SINGLE USE, DISPOSABLE, FLEXIBLE SHEATH INJECTION NEEDLE USED FOR THE INJECTION OF VARIOUS TYPES OF MEDIA THROUGH FLEXIBLE ENDOSCOPES.

## (undated) DEVICE — ANTIBACTERIAL UNDYED BRAIDED (POLYGLACTIN 910), SYNTHETIC ABSORBABLE SUTURE: Brand: COATED VICRYL

## (undated) DEVICE — SMOKE EVACUATION TUBING WITH 8 IN INTEGRAL WAND AND SPONGE GUARD: Brand: BUFFALO FILTER

## (undated) DEVICE — LOU LITHOTOMY ROBOTIC: Brand: MEDLINE INDUSTRIES, INC.

## (undated) DEVICE — COLUMN DRAPE

## (undated) DEVICE — GLV SURG SENSICARE PI LF PF 7.5 GRN STRL

## (undated) DEVICE — YANKAUER,BULB TIP,W/O VENT,RIGID,STERILE: Brand: MEDLINE

## (undated) DEVICE — THE STERILE LIGHT HANDLE COVER IS USED WITH STERIS SURGICAL LIGHTING AND VISUALIZATION SYSTEMS.

## (undated) DEVICE — GOWN SURG AERO CHROME XL

## (undated) DEVICE — APPL HEMOS FIBRIN DUPLOTIP W/SNPLK 5MM 32CM

## (undated) DEVICE — LN SMPL CO2 SHTRM SD STREAM W/M LUER

## (undated) DEVICE — GOWN,SIRUS,NONRNF,SETINSLV,XL,20/CS: Brand: MEDLINE

## (undated) DEVICE — ENDOPATH PNEUMONEEDLE INSUFFLATION NEEDLES WITH LUER LOCK CONNECTORS 120MM: Brand: ENDOPATH

## (undated) DEVICE — IRRIGATOR BULB ASEPTO 60CC STRL

## (undated) DEVICE — SEALANT WND FIBRIN TISSEEL PREFIL/SYR/PRIMAFZ 10ML

## (undated) DEVICE — PENCL ES MEGADINE EZ/CLEAN BUTN W/HOLSTR 10FT

## (undated) DEVICE — 60 ML SYRINGE LUER-LOCK TIP: Brand: MONOJECT

## (undated) DEVICE — KT DRSNG VAC SIMPLACE MD 5PK

## (undated) DEVICE — SYR LL TP 10ML STRL

## (undated) DEVICE — ADAPT CLN BIOGUARD AIR/H2O DISP

## (undated) DEVICE — KT CANSTR VAC WND W/ISOLYSER SENSATRAC 500CC 5CS

## (undated) DEVICE — 3M™ STERI-DRAPE™ INSTRUMENT POUCH 1018L: Brand: STERI-DRAPE™

## (undated) DEVICE — TUBING, SUCTION, 1/4" X 20', STRAIGHT: Brand: MEDLINE INDUSTRIES, INC.

## (undated) DEVICE — SPONGE,LAP,18"X18",DLX,XR,ST,5/PK,40/PK: Brand: MEDLINE

## (undated) DEVICE — ARM DRAPE

## (undated) DEVICE — CANNULA,OXY,ADULT,SUPER SOFT,W/14'TUB,UC: Brand: MEDLINE INDUSTRIES, INC.

## (undated) DEVICE — SUT PDS 1 CT1 36IN Z347H

## (undated) DEVICE — 3M™ IOBAN™ 2 ANTIMICROBIAL INCISE DRAPE 6648EZ: Brand: IOBAN™ 2

## (undated) DEVICE — PK PROC MAJ 40

## (undated) DEVICE — SENSR O2 OXIMAX FNGR A/ 18IN NONSTR

## (undated) DEVICE — DISPOSABLE GRASPER CARTRIDGE: Brand: DIRECT DRIVE REPOSABLE GRASPERS

## (undated) DEVICE — THE SINGLE USE ETRAP – POLYP TRAP IS USED FOR SUCTION RETRIEVAL OF ENDOSCOPICALLY REMOVED POLYPS.: Brand: ETRAP

## (undated) DEVICE — INTENDED FOR TISSUE SEPARATION, AND OTHER PROCEDURES THAT REQUIRE A SHARP SURGICAL BLADE TO PUNCTURE OR CUT.: Brand: BARD-PARKER ® CARBON RIB-BACK BLADES

## (undated) DEVICE — COLOSTOMY/ILEOSTOMY KIT, FLEXWEAR: Brand: NEW IMAGE

## (undated) DEVICE — Device: Brand: BLACK EYE

## (undated) DEVICE — VESSEL SEALER EXTEND: Brand: ENDOWRIST

## (undated) DEVICE — PDS II VLT 0 107CM AG ST3: Brand: ENDOLOOP

## (undated) DEVICE — BANDAGE,GAUZE,BULKEE II,4.5"X4.1YD,STRL: Brand: MEDLINE

## (undated) DEVICE — ACCESS PLATFORM FOR MINIMALLY INVASIVE SURGERY: Brand: GELPOINT®  MINI ADVANCED ACCESS PLATFORM

## (undated) DEVICE — TUBING, SUCTION, 1/4" X 10', STRAIGHT: Brand: MEDLINE

## (undated) DEVICE — ENDOPOUCH RETRIEVER SPECIMEN RETRIEVAL BAGS: Brand: ENDOPOUCH RETRIEVER

## (undated) DEVICE — SUT MNCRYL PLS ANTIB UD 4/0 PS2 18IN

## (undated) DEVICE — LASSO POLYPECTOMY SNARE: Brand: LASSO

## (undated) DEVICE — SUCTION IRRIGATOR: Brand: ENDOWRIST

## (undated) DEVICE — BLADELESS OBTURATOR: Brand: WECK VISTA

## (undated) DEVICE — SYR LUERLOK 5CC

## (undated) DEVICE — HARMONIC 700 SHEARS, ADVANCED HEMOSTASIS: Brand: HARMONIC

## (undated) DEVICE — SOL ANTISTICK CAUTRY ELECTROLUBE LF